# Patient Record
Sex: FEMALE | Race: BLACK OR AFRICAN AMERICAN | Employment: FULL TIME | URBAN - METROPOLITAN AREA
[De-identification: names, ages, dates, MRNs, and addresses within clinical notes are randomized per-mention and may not be internally consistent; named-entity substitution may affect disease eponyms.]

---

## 2017-01-31 ENCOUNTER — TELEPHONE (OUTPATIENT)
Dept: INTERNAL MEDICINE CLINIC | Age: 57
End: 2017-01-31

## 2017-02-01 RX ORDER — INSULIN DEGLUDEC 200 U/ML
55 INJECTION, SOLUTION SUBCUTANEOUS DAILY
Qty: 1 PEN | Refills: 12
Start: 2017-02-01 | End: 2017-02-03 | Stop reason: SDUPTHER

## 2017-02-06 RX ORDER — INSULIN DEGLUDEC 200 U/ML
55 INJECTION, SOLUTION SUBCUTANEOUS DAILY
Qty: 1 PEN | Refills: 12 | Status: SHIPPED | OUTPATIENT
Start: 2017-02-06 | End: 2017-06-08

## 2017-03-10 RX ORDER — PEN NEEDLE, DIABETIC 32 GX 1/4"
NEEDLE, DISPOSABLE MISCELLANEOUS
Qty: 100 PEN NEEDLE | Refills: 0 | Status: SHIPPED | OUTPATIENT
Start: 2017-03-10 | End: 2017-04-25 | Stop reason: SDUPTHER

## 2017-04-14 ENCOUNTER — OFFICE VISIT (OUTPATIENT)
Dept: INTERNAL MEDICINE CLINIC | Age: 57
End: 2017-04-14

## 2017-04-14 VITALS
OXYGEN SATURATION: 98 % | SYSTOLIC BLOOD PRESSURE: 136 MMHG | TEMPERATURE: 98 F | HEART RATE: 88 BPM | WEIGHT: 185 LBS | BODY MASS INDEX: 32.78 KG/M2 | RESPIRATION RATE: 16 BRPM | HEIGHT: 63 IN | DIASTOLIC BLOOD PRESSURE: 88 MMHG

## 2017-04-14 DIAGNOSIS — I10 ESSENTIAL HYPERTENSION WITH GOAL BLOOD PRESSURE LESS THAN 140/90: ICD-10-CM

## 2017-04-14 DIAGNOSIS — E11.42 CONTROLLED TYPE 2 DIABETES MELLITUS WITH DIABETIC POLYNEUROPATHY, WITH LONG-TERM CURRENT USE OF INSULIN (HCC): ICD-10-CM

## 2017-04-14 DIAGNOSIS — E78.00 HYPERCHOLESTEREMIA: Primary | ICD-10-CM

## 2017-04-14 DIAGNOSIS — Z79.4 CONTROLLED TYPE 2 DIABETES MELLITUS WITH DIABETIC POLYNEUROPATHY, WITH LONG-TERM CURRENT USE OF INSULIN (HCC): ICD-10-CM

## 2017-04-14 DIAGNOSIS — G57.61 MORTON NEUROMA, RIGHT: ICD-10-CM

## 2017-04-14 RX ORDER — PREGABALIN 150 MG/1
CAPSULE ORAL
Qty: 60 CAP | Refills: 3 | Status: SHIPPED | OUTPATIENT
Start: 2017-04-14 | End: 2017-07-17 | Stop reason: ALTCHOICE

## 2017-04-14 RX ORDER — ATORVASTATIN CALCIUM 40 MG/1
TABLET, FILM COATED ORAL
Qty: 30 TAB | Refills: 7 | Status: SHIPPED | OUTPATIENT
Start: 2017-04-14 | End: 2018-03-29 | Stop reason: SDUPTHER

## 2017-04-14 NOTE — PROGRESS NOTES
Glen Lynn is a 55 y.o. female and presents with Diabetes (foot pain) and Referral Request (ortho)  . Subjective:  She has pains in the rt foot with a numbness reported. He also has pains in her hands with a numbness and tingling. She has seen podiatry and was told she has a neuroma. She has had no relief with neurontin. Health maintenance suggests the needs for a test for occult blood        Hypertension Review:  The patient has hypertension . Diet and Lifestyle: generally follows a low sodium diet, exercises sporadically  Home BP Monitoring: is not measured at home. Pertinent ROS: taking medications as instructed, no medication side effects noted, no TIA's, no chest pain on exertion, no dyspnea on exertion, no swelling of ankles. Dyslipidemia Review:  Patient presents for evaluation of lipids. Compliance with treatment thus far has been excellent. A repeat fasting lipid profile was done. The patient does not use medications that may worsen dyslipidemias . The patient exercises sporadically. The patient is not known to have coexisting coronary artery disease    Diabetes Mellitus Review:  She has diabetes mellitus. Diabetic ROS - medication compliance: compliant all of the time, diabetic diet compliance: compliant all of the time, home glucose monitoring: is performed. Known diabetic complications: none  Cardiovascular risk factors: family history, dyslipidemia, diabetes mellitus, obesity, hypertension  Current diabetic medications include oral agents/insulin   Eye exam current (within one year): no  Weight trend: stable  Prior visit with dietician: no  Current diet: \"healthy\" diet  in general  Current exercise: walking  Current monitoring regimen: home blood tests - daily  Home blood sugar records: trend: stable  Any episodes of hypoglycemia? no  Is She on ACE inhibitor or angiotensin II receptor blocker?    Yes   Lab review: orders written for new lab studies as appropriate; see orders. Insomnia Review:  Patient is seen for insomnia. Treatment includes benzodiazepines   Ongoing symptoms include continued bouts of insomnia,bouts of anxiety and depression are reported          Review of Systems  Constitutional: negative for fevers, chills, anorexia and weight loss  Eyes:   negative for visual disturbance and irritation  ENT:   negative for tinnitus,sore throat,nasal congestion,ear pains. hoarseness  Respiratory:  negative for cough, hemoptysis, dyspnea,wheezing  CV:   negative for chest pain, palpitations, lower extremity edema  GI:   negative for nausea, vomiting, diarrhea, abdominal pain,melena  Endo:               negative for polyuria,polydipsia,polyphagia,heat intolerance  Genitourinary: negative for frequency, dysuria and hematuria  Integument:  negative for rash and pruritus  Hematologic:  negative for easy bruising and gum/nose bleeding  Musculoskel: negative for myalgias, arthralgias, back pain, muscle weakness, joint pain  Neurological:  negative for headaches, dizziness, vertigo, memory problems and gait   Behavl/Psych: feelings of anxiety, depression, mood changes,insomnia    Past Medical History:   Diagnosis Date    [facility]     Diabetes Mellitus 4/25/2011    Diabetes Mellitus 4/25/2011    GERG - Esophagitis- reflux 4/25/2011    Hypercholesteremia 4/25/2011    Hypercholesterolemia 4/25/2011    Hypertension 4/25/2011    Intertrigo 4/25/2011    Leiomyoma of uterus, unspecified 4/25/2011    Unspecified visual loss 4/25/2011     History reviewed. No pertinent surgical history.   Social History     Social History    Marital status:      Spouse name: N/A    Number of children: N/A    Years of education: N/A     Social History Main Topics    Smoking status: Former Smoker    Smokeless tobacco: Never Used    Alcohol use No    Drug use: No    Sexual activity: No     Other Topics Concern    None     Social History Narrative     Family History   Problem Relation Age of Onset    Diabetes Mother     Hypertension Mother     Diabetes Father      Current Outpatient Prescriptions   Medication Sig Dispense Refill    atorvastatin (LIPITOR) 40 mg tablet TAKE 1 TABLET BY MOUTH DAILY. 30 Tab 7    pregabalin (LYRICA) 150 mg capsule TAKE ONE CAPSULE BY MOUTH TWICE A DAY 60 Cap 3    NOVOFINE 32 32 gauge x 1/4\" ndle USE AS DIRECTED 100 Pen Needle 0    PARoxetine (PAXIL) 10 mg tablet Take 1 Tab by mouth daily. 30 Tab 3    mometasone (NASONEX) 50 mcg/actuation nasal spray 2 Sprays by Both Nostrils route daily. 1 Container 12    losartan (COZAAR) 100 mg tablet Take 1 Tab by mouth daily. 90 Tab 3    amLODIPine (NORVASC) 5 mg tablet TAKE 1 TABLET EVERY DAY 30 Tab 12    capsaicin 0.075 % topical cream Apply  to affected area three (3) times daily. 60 g 12    insulin detemir (LEVEMIR FLEXTOUCH) 100 unit/mL (3 mL) inpn 30 units am,25units pm sc 1 Pen 12    naproxen (NAPROSYN) 500 mg tablet Take 1 Tab by mouth two (2) times daily (with meals). 60 Tab 12    zolpidem (AMBIEN) 5 mg tablet Take 1 Tab by mouth nightly as needed for Sleep. 30 Tab 0    albuterol (PROVENTIL HFA) 90 mcg/actuation inhaler Take 2 Puffs by inhalation every four (4) hours as needed for Wheezing. 1 Inhaler 12    omeprazole (PRILOSEC) 40 mg capsule Take 1 Cap by mouth daily. 30 Cap 12    Blood-Glucose Meter monitoring kit CONTOUR METER; Use as directed 1 Kit 0    glucose blood VI test strips (ASCENSIA CONTOUR) strip Daily 50 Package 11    glucose blood VI test strips (BLOOD GLUCOSE TEST) strip As directed   1 Package 12    insulin degludec (TRESIBA FLEXTOUCH U-200) 200 unit/mL (3 mL) inpn 55 Units by SubCUTAneous route daily.  Inject 55 units once daily 1 Pen 12     Allergies   Allergen Reactions    Sulfur Hives    Amoxicillin Nausea Only    Other Medication Unknown (comments)     Zylocaine         Objective:  Visit Vitals    /88    Pulse 88    Temp 98 °F (36.7 °C) (Oral)    Resp 16     5' 3\" (1.6 m)    Wt 185 lb (83.9 kg)    LMP 10/07/2012    SpO2 98%    BMI 32.77 kg/m2     Physical Exam:   General appearance - alert, well appearing, and in no distress  Mental status - alert, oriented to person, place, and time  EYE-CHARLES, EOMI, corneas normal, no foreign bodies  ENT-ENT exam normal, no neck nodes or sinus tenderness  Nose - normal and patent, no erythema, discharge or polyps  Mouth - mucous membranes moist, pharynx normal without lesions  Neck - supple, no significant adenopathy   Chest - clear to auscultation, no wheezes, rales or rhonchi, symmetric air entry   Heart - normal rate, regular rhythm, normal S1, S2, no murmurs, rubs, clicks or gallops   Abdomen - soft, nontender, nondistended, no masses or organomegaly  Lymph- no adenopathy palpable  Ext-peripheral pulses normal, no pedal edema, no clubbing or cyanosis  Skin-Warm and dry. no hyperpigmentation, vitiligo, or suspicious lesions  Neuro -alert, oriented, normal speech, no focal findings or movement disorder noted  Neck-normal C-spine, no tenderness, full ROM without pain  Rt.foot-tenderness noted plantar surface. Decrease sensation both feet. Decrease sensation both hands    Results for orders placed or performed in visit on 09/22/16   AMB POC GLUCOSE BLOOD, BY GLUCOSE MONITORING DEVICE   Result Value Ref Range    Glucose  mg/dL   AMB POC HEMOGLOBIN A1C   Result Value Ref Range    Hemoglobin A1c (POC) 10.9 %   AMB POC LIPID PROFILE   Result Value Ref Range    Cholesterol (POC) 189     Triglycerides (POC) 137     HDL Cholesterol (POC) 49     LDL Cholesterol (POC) 113     Non-HDL Goal (POC) 140     TChol/HDL Ratio (POC) 3.8        Assessment/Plan:    ICD-10-CM ICD-9-CM    1. Hypercholesteremia E78.00 272.0 atorvastatin (LIPITOR) 40 mg tablet   2.  Controlled type 2 diabetes mellitus with diabetic polyneuropathy, with long-term current use of insulin (HCC) E11.42 250.60 pregabalin (LYRICA) 150 mg capsule    Z79.4 357.2 V58. 67    3. Essential hypertension with goal blood pressure less than 140/90 I10 401.9    4. Govea neuroma, right G57.61 355. 6 pregabalin (LYRICA) 150 mg capsule      REFERRAL TO PODIATRY     Orders Placed This Encounter    REFERRAL TO PODIATRY     Referral Priority:   Routine     Referral Type:   Consultation     Referral Reason:   Specialty Services Required     Referral Location:   [facility]     Referred to Provider:   Sandra Branch DPM     Number of Visits Requested:   1    atorvastatin (LIPITOR) 40 mg tablet     Sig: TAKE 1 TABLET BY MOUTH DAILY. Dispense:  30 Tab     Refill:  7    pregabalin (LYRICA) 150 mg capsule     Sig: TAKE ONE CAPSULE BY MOUTH TWICE A DAY     Dispense:  60 Cap     Refill:  3     This request is for a new prescription for a controlled substance as required by Federal/State law. .     lose weight, increase physical activity, follow low fat diet, follow low salt diet,Take 81mg aspirin daily  Patient Instructions   Savored Activation    Thank you for requesting access to Savored. Please follow the instructions below to securely access and download your online medical record. Savored allows you to send messages to your doctor, view your test results, renew your prescriptions, schedule appointments, and more. How Do I Sign Up? 1. In your internet browser, go to [email or web address]  2. Click on the First Time User? Click Here link in the Sign In box. You will be redirect to the New Member Sign Up page. 3. Enter your Savored Access Code exactly as it appears below. You will not need to use this code after youve completed the sign-up process. If you do not sign up before the expiration date, you must request a new code. Savored Access Code: Activation code not generated  Current Savored Status: Patient Declined (This is the date your Savored access code will )    4.  Enter the last four digits of your Social Security Number (xxxx) and Date of Birth (mm/dd/yyyy) as indicated and click Submit. You will be taken to the next sign-up page. 5. Create a WinLocalt ID. This will be your Avedro login ID and cannot be changed, so think of one that is secure and easy to remember. 6. Create a Avedro password. You can change your password at any time. 7. Enter your Password Reset Question and Answer. This can be used at a later time if you forget your password. 8. Enter your e-mail address. You will receive e-mail notification when new information is available in [address] 9. Click Sign Up. You can now view and download portions of your medical record. 10. Click the Download Summary menu link to download a portable copy of your medical information. Additional Information    If you have questions, please visit the Frequently Asked Questions section of the Avedro website at [email or web address] [email or web address] My Life My [email or web address] Remember, Avedro is NOT to be used for urgent needs. For medical emergencies, dial 911. Follow-up Disposition:  Return in about 3 months (around 7/14/2017), or if symptoms worsen or fail to improve. I have reviewed with the patient details of the assessment and plan and all questions were answered. Relevent patient education was performed    An After Visit Summary was printed and given to the patient.

## 2017-04-14 NOTE — PATIENT INSTRUCTIONS
[employer] Activation    Thank you for requesting access to [employer]. Please follow the instructions below to securely access and download your online medical record. [employer] allows you to send messages to your doctor, view your test results, renew your prescriptions, schedule appointments, and more. How Do I Sign Up? 1. In your internet browser, go to [email or web address]  2. Click on the First Time User? Click Here link in the Sign In box. You will be redirect to the New Member Sign Up page. 3. Enter your [employer] Access Code exactly as it appears below. You will not need to use this code after youve completed the sign-up process. If you do not sign up before the expiration date, you must request a new code. [employer] Products Access Code: Activation code not generated  Current [employer] Status: Patient Declined (This is the date your [employer] access code will )    4. Enter the last four digits of your Social Security Number (xxxx) and Date of Birth (mm/dd/yyyy) as indicated and click Submit. You will be taken to the next sign-up page. 5. Create a [employer] Products ID. This will be your [employer] login ID and cannot be changed, so think of one that is secure and easy to remember. 6. Create a [employer] Products password. You can change your password at any time. 7. Enter your Password Reset Question and Answer. This can be used at a later time if you forget your password. 8. Enter your e-mail address. You will receive e-mail notification when new information is available in [address] 9. Click Sign Up. You can now view and download portions of your medical record. 10. Click the Download Summary menu link to download a portable copy of your medical information. Additional Information    If you have questions, please visit the Frequently Asked Questions section of the Renew[employer] Products website at [email or web address] Remember, [employer] Products is NOT to be used for urgent needs. For medical emergencies, dial 911.

## 2017-04-14 NOTE — MR AVS SNAPSHOT
Visit Information Date & Time Provider Department Dept. Phone Encounter #  
 4/14/2017  9:30 AM Anusha Wilder MD [facility] [facility] [phone] [id number] Follow-up Instructions Return in about 3 months (around 7/14/2017), or if symptoms worsen or fail to improve. Follow-up and Disposition History Your Appointments 4/21/2017 10:30 AM  
ROUTINE CARE with Anusha Wilder MD  
[facility] - Vikas Chaevz ([facility]) Appt Note: Check up w/ med refill [address] [facility],[address] [facility] 7 [phone]  
[phone]  
  
   
 [address] [place] [phone] Upcoming Health Maintenance Date Due Pneumococcal 19-64 Medium Risk (1 of 1 - PPSV23) 8/10/1980 INFLUENZA AGE 9 TO ADULT 8/1/2016 FOBT Q 1 YEAR AGE 50-75 10/23/2016 HEMOGLOBIN A1C Q6M 3/22/2017 PAP AKA CERVICAL CYTOLOGY 4/15/2017 MICROALBUMIN Q1 6/22/2017 LIPID PANEL Q1 9/22/2017 EYE EXAM RETINAL OR DILATED Q1 12/7/2017 BREAST CANCER SCRN MAMMOGRAM 2/26/2018 FOOT EXAM Q1 4/14/2018 DTaP/Tdap/Td series (2 - Td) 2/27/2025 Allergies as of 4/14/2017  Review Complete On: 4/14/2017 By: Esthela Eli LPN Severity Noted Reaction Type Reactions Sulfur High 03/10/2016    Hives Amoxicillin  04/19/2013    Nausea Only Other Medication  04/25/2011    Unknown (comments) [place] [employer] Current Immunizations  Reviewed on 2/27/2015 Name Date Tdap 2/27/2015 Not reviewed this visit You Were Diagnosed With   
  
 Codes Comments Hypercholesteremia    -  Primary ICD-10-CM: E78.00 ICD-9-CM: 272.0 Controlled type 2 diabetes mellitus with diabetic polyneuropathy, with long-term current use of insulin (HCC)     ICD-10-CM: E11.42, Z79.4 ICD-9-CM: 250.60, 357.2, V58.67 Essential hypertension with goal blood pressure less than 140/90     ICD-10-CM: I10 
ICD-9-CM: 401.9 Jeferson neuroma, right     ICD-10-CM: G57.62 ICD-9-CM: 845. 6 Vitals BP Pulse Temp Resp Height(growth percentile) Weight(growth percentile) 136/88 88 98 °F (36.7 °C) (Oral) 16 5' 3\" (1.6 m) 185 lb (83.9 kg) LMP SpO2 BMI OB Status Smoking Status 10/07/2012 98% 32.77 kg/m2 Postmenopausal Former Smoker BMI and BSA Data Body Mass Index Body Surface Area 32.77 kg/m 2 1.93 m 2 Preferred Pharmacy Pharmacy Name Phone [id number], [place] - [address] [phone] Your Updated Medication List  
  
   
This list is accurate as of: 4/14/17  9:57 AM.  Always use your most recent med list.  
  
  
  
  
 albuterol 90 mcg/actuation inhaler Commonly known as:  PROVENTIL HFA Take 2 Puffs by inhalation every four (4) hours as needed for Wheezing. amLODIPine 5 mg tablet Commonly known as:  [employer] TAKE 1 TABLET EVERY DAY  
  
 atorvastatin 40 mg tablet Commonly known as:  LIPITOR  
TAKE 1 TABLET BY MOUTH DAILY. Blood-Glucose Meter monitoring kit CONTOUR METER; Use as directed  
  
 capsaicin 0.075 % topical cream  
Apply  to affected area three (3) times daily. * glucose blood VI test strips strip Commonly known as:  blood glucose test  
As directed * glucose blood VI test strips strip Commonly known as:  Ascensia CONTOUR Daily  
  
 insulin degludec 200 unit/mL (3 mL) Inpn Commonly known as:  TRESIBA FLEXTOUCH U-200  
55 Units by SubCUTAneous route daily. Inject 55 units once daily  
  
 insulin detemir 100 unit/mL (3 mL) Inpn Commonly known as:  LEVEMIR FLEXTOUCH  
30 units am,25units pm sc  
  
 losartan 100 mg tablet Commonly known as:  COZAAR Take 1 Tab by mouth daily. mometasone 50 mcg/actuation nasal spray Commonly known as:  NASONEX  
2 Sprays by Both Nostrils route daily. naproxen 500 mg tablet Commonly known as:  NAPROSYN  
 Take 1 Tab by mouth two (2) times daily (with meals). NOVOFINE 32 32 gauge x 1/4\" Ndle Generic drug:  Insulin Needles (Disposable) USE AS DIRECTED  
  
 omeprazole 40 mg capsule Commonly known as:  PriLOSEC Take 1 Cap by mouth daily. PARoxetine 10 mg tablet Commonly known as:  PAXIL Take 1 Tab by mouth daily. pregabalin 150 mg capsule Commonly known as:  Irving [employer]el TAKE ONE CAPSULE BY MOUTH TWICE A DAY  
  
 zolpidem 5 mg tablet Commonly known as:  AMBIEN Take 1 Tab by mouth nightly as needed for Sleep. * Notice: This list has 2 medication(s) that are the same as other medications prescribed for you. Read the directions carefully, and ask your doctor or other care provider to review them with you. Prescriptions Printed Refills  
 pregabalin (LYRICA) 150 mg capsule 3 Sig: TAKE ONE CAPSULE BY MOUTH TWICE A DAY Class: Print Prescriptions Sent to Pharmacy Refills  
 atorvastatin (LIPITOR) 40 mg tablet 7 Sig: TAKE 1 TABLET BY MOUTH DAILY. Class: Normal  
 Pharmacy: [facility]/pharmacy [address] [place] [address] S[address]  #: [phone] We Performed the Following REFERRAL TO PODIATRY [REF90 Custom] Follow-up Instructions Return in about 3 months (around 7/14/2017), or if symptoms worsen or fail to improve. Referral Information Referral ID Referred By Referred To  
  
 [id number] [facility] [address] [place] [address], [address] Visits Status Start Date End Date 1 New Request 4/14/17 4/14/18 If your referral has a status of pending review or denied, additional information will be sent to support the outcome of this decision. Patient Instructions [email or web address] Activation Thank you for requesting access to GetIntent.  Please follow the instructions below to securely access and download your online medical record. Solapa4 allows you to send messages to your doctor, view your test results, renew your prescriptions, schedule appointments, and more. How Do I Sign Up? 1. In your internet browser, go to [email or web address] 
2. Click on the First Time User? Click Here link in the Sign In box. You will be redirect to the New Member Sign Up page. 3. Enter your Solapa4 Access Code exactly as it appears below. You will not need to use this code after youve completed the sign-up process. If you do not sign up before the expiration date, you must request a new code. Solapa4 Access Code: Activation code not generated Current Solapa4 Status: Patient Declined (This is the date your Solapa4 access code will ) 4. Enter the last four digits of your Social Security Number (xxxx) and Date of Birth (mm/dd/yyyy) as indicated and click Submit. You will be taken to the next sign-up page. 5. Create a Solapa4 ID. This will be your Solapa4 login ID and cannot be changed, so think of one that is secure and easy to remember. 6. Create a Solapa4 password. You can change your password at any time. 7. Enter your Password Reset Question and Answer. This can be used at a later time if you forget your password. 8. Enter your e-mail address. You will receive e-mail notification when new information is available in [address] 9. Click Sign Up. You can now view and download portions of your medical record. 10. Click the Download Summary menu link to download a portable copy of your medical information. Additional Information If you have questions, please visit the Frequently Asked Questions section of the Solapa4 website at [email or web address] Remember, Solapa4 is NOT to be used for urgent needs. For medical emergencies, dial 911. Please provide this summary of care documentation to your next provider. Your primary care clinician is listed as Felix Enamorado. If you have any questions after today's visit, please call [phone].

## 2017-04-25 RX ORDER — BLOOD SUGAR DIAGNOSTIC
STRIP MISCELLANEOUS
Qty: 100 PEN NEEDLE | Refills: 0 | Status: SHIPPED | OUTPATIENT
Start: 2017-04-25 | End: 2017-12-17 | Stop reason: SDUPTHER

## 2017-06-08 ENCOUNTER — APPOINTMENT (OUTPATIENT)
Dept: CT IMAGING | Age: 57
End: 2017-06-08
Attending: PHYSICIAN ASSISTANT
Payer: COMMERCIAL

## 2017-06-08 ENCOUNTER — HOSPITAL ENCOUNTER (EMERGENCY)
Age: 57
Discharge: HOME | End: 2017-06-08
Attending: EMERGENCY MEDICINE
Payer: COMMERCIAL

## 2017-06-08 VITALS
OXYGEN SATURATION: 97 % | RESPIRATION RATE: 18 BRPM | HEART RATE: 67 BPM | WEIGHT: 177.69 LBS | TEMPERATURE: 98.5 F | BODY MASS INDEX: 32.7 KG/M2 | HEIGHT: 62 IN | SYSTOLIC BLOOD PRESSURE: 206 MMHG | DIASTOLIC BLOOD PRESSURE: 101 MMHG

## 2017-06-08 DIAGNOSIS — K80.20 CALCULUS OF GALLBLADDER WITHOUT CHOLECYSTITIS WITHOUT OBSTRUCTION: ICD-10-CM

## 2017-06-08 DIAGNOSIS — Z71.6 TOBACCO ABUSE COUNSELING: ICD-10-CM

## 2017-06-08 DIAGNOSIS — R11.2 NON-INTRACTABLE VOMITING WITH NAUSEA, UNSPECIFIED VOMITING TYPE: Primary | ICD-10-CM

## 2017-06-08 LAB
ALBUMIN SERPL BCP-MCNC: 3.6 G/DL (ref 3.5–5)
ALBUMIN/GLOB SERPL: 0.9 {RATIO} (ref 1.1–2.2)
ALP SERPL-CCNC: 133 U/L (ref 45–117)
ALT SERPL-CCNC: 23 U/L (ref 12–78)
ANION GAP BLD CALC-SCNC: 3 MMOL/L (ref 5–15)
APPEARANCE UR: CLEAR
AST SERPL W P-5'-P-CCNC: 102 U/L (ref 15–37)
BACTERIA URNS QL MICRO: NEGATIVE /HPF
BASOPHILS # BLD AUTO: 0 K/UL (ref 0–0.1)
BASOPHILS # BLD: 0 % (ref 0–1)
BILIRUB SERPL-MCNC: 0.5 MG/DL (ref 0.2–1)
BILIRUB UR QL: NEGATIVE
BUN SERPL-MCNC: 11 MG/DL (ref 6–20)
BUN/CREAT SERPL: 9 (ref 12–20)
CALCIUM SERPL-MCNC: 9.1 MG/DL (ref 8.5–10.1)
CHLORIDE SERPL-SCNC: 102 MMOL/L (ref 97–108)
CO2 SERPL-SCNC: 33 MMOL/L (ref 21–32)
COLOR UR: ABNORMAL
CREAT SERPL-MCNC: 1.22 MG/DL (ref 0.55–1.02)
EOSINOPHIL # BLD: 0 K/UL (ref 0–0.4)
EOSINOPHIL NFR BLD: 0 % (ref 0–7)
EPITH CASTS URNS QL MICRO: ABNORMAL /LPF
ERYTHROCYTE [DISTWIDTH] IN BLOOD BY AUTOMATED COUNT: 13.5 % (ref 11.5–14.5)
GLOBULIN SER CALC-MCNC: 4.1 G/DL (ref 2–4)
GLUCOSE BLD STRIP.AUTO-MCNC: 279 MG/DL (ref 65–100)
GLUCOSE SERPL-MCNC: 281 MG/DL (ref 65–100)
GLUCOSE UR STRIP.AUTO-MCNC: 250 MG/DL
HCT VFR BLD AUTO: 43 % (ref 35–47)
HGB BLD-MCNC: 14.3 G/DL (ref 11.5–16)
HGB UR QL STRIP: NEGATIVE
HYALINE CASTS URNS QL MICRO: ABNORMAL /LPF (ref 0–5)
KETONES UR QL STRIP.AUTO: NEGATIVE MG/DL
LACTATE SERPL-SCNC: 1.2 MMOL/L (ref 0.4–2)
LEUKOCYTE ESTERASE UR QL STRIP.AUTO: NEGATIVE
LIPASE SERPL-CCNC: 81 U/L (ref 73–393)
LYMPHOCYTES # BLD AUTO: 12 % (ref 12–49)
LYMPHOCYTES # BLD: 1.3 K/UL (ref 0.8–3.5)
MCH RBC QN AUTO: 27 PG (ref 26–34)
MCHC RBC AUTO-ENTMCNC: 33.3 G/DL (ref 30–36.5)
MCV RBC AUTO: 81.1 FL (ref 80–99)
MONOCYTES # BLD: 0.5 K/UL (ref 0–1)
MONOCYTES NFR BLD AUTO: 4 % (ref 5–13)
NEUTS SEG # BLD: 8.9 K/UL (ref 1.8–8)
NEUTS SEG NFR BLD AUTO: 84 % (ref 32–75)
NITRITE UR QL STRIP.AUTO: NEGATIVE
PH UR STRIP: 6.5 [PH] (ref 5–8)
PLATELET # BLD AUTO: 189 K/UL (ref 150–400)
POTASSIUM SERPL-SCNC: 3.2 MMOL/L (ref 3.5–5.1)
PROT SERPL-MCNC: 7.7 G/DL (ref 6.4–8.2)
PROT UR STRIP-MCNC: ABNORMAL MG/DL
RBC # BLD AUTO: 5.3 M/UL (ref 3.8–5.2)
RBC #/AREA URNS HPF: ABNORMAL /HPF (ref 0–5)
SERVICE CMNT-IMP: ABNORMAL
SODIUM SERPL-SCNC: 138 MMOL/L (ref 136–145)
SP GR UR REFRACTOMETRY: 1.01 (ref 1–1.03)
UA: UC IF INDICATED,UAUC: ABNORMAL
UROBILINOGEN UR QL STRIP.AUTO: 0.2 EU/DL (ref 0.2–1)
WBC # BLD AUTO: 10.8 K/UL (ref 3.6–11)
WBC URNS QL MICRO: ABNORMAL /HPF (ref 0–4)

## 2017-06-08 PROCEDURE — 74011250636 HC RX REV CODE- 250/636: Performed by: PHYSICIAN ASSISTANT

## 2017-06-08 PROCEDURE — 74011250636 HC RX REV CODE- 250/636: Performed by: EMERGENCY MEDICINE

## 2017-06-08 PROCEDURE — 74011250637 HC RX REV CODE- 250/637: Performed by: PHYSICIAN ASSISTANT

## 2017-06-08 PROCEDURE — 96374 THER/PROPH/DIAG INJ IV PUSH: CPT

## 2017-06-08 PROCEDURE — 81001 URINALYSIS AUTO W/SCOPE: CPT | Performed by: PHYSICIAN ASSISTANT

## 2017-06-08 PROCEDURE — 74011636320 HC RX REV CODE- 636/320: Performed by: EMERGENCY MEDICINE

## 2017-06-08 PROCEDURE — 85025 COMPLETE CBC W/AUTO DIFF WBC: CPT | Performed by: PHYSICIAN ASSISTANT

## 2017-06-08 PROCEDURE — 80053 COMPREHEN METABOLIC PANEL: CPT | Performed by: PHYSICIAN ASSISTANT

## 2017-06-08 PROCEDURE — 82962 GLUCOSE BLOOD TEST: CPT

## 2017-06-08 PROCEDURE — 99285 EMERGENCY DEPT VISIT HI MDM: CPT

## 2017-06-08 PROCEDURE — 96375 TX/PRO/DX INJ NEW DRUG ADDON: CPT

## 2017-06-08 PROCEDURE — 96361 HYDRATE IV INFUSION ADD-ON: CPT

## 2017-06-08 PROCEDURE — 83690 ASSAY OF LIPASE: CPT | Performed by: PHYSICIAN ASSISTANT

## 2017-06-08 PROCEDURE — 74177 CT ABD & PELVIS W/CONTRAST: CPT

## 2017-06-08 PROCEDURE — 83605 ASSAY OF LACTIC ACID: CPT | Performed by: PHYSICIAN ASSISTANT

## 2017-06-08 PROCEDURE — 36415 COLL VENOUS BLD VENIPUNCTURE: CPT | Performed by: PHYSICIAN ASSISTANT

## 2017-06-08 PROCEDURE — 96376 TX/PRO/DX INJ SAME DRUG ADON: CPT

## 2017-06-08 RX ORDER — PIROXICAM 20 MG/1
20 CAPSULE ORAL DAILY
COMMUNITY
End: 2017-10-17 | Stop reason: ALTCHOICE

## 2017-06-08 RX ORDER — AMLODIPINE BESYLATE 5 MG/1
5 TABLET ORAL
Status: COMPLETED | OUTPATIENT
Start: 2017-06-08 | End: 2017-06-08

## 2017-06-08 RX ORDER — SODIUM CHLORIDE 9 MG/ML
50 INJECTION, SOLUTION INTRAVENOUS
Status: COMPLETED | OUTPATIENT
Start: 2017-06-08 | End: 2017-06-08

## 2017-06-08 RX ORDER — ONDANSETRON 2 MG/ML
4 INJECTION INTRAMUSCULAR; INTRAVENOUS
Status: COMPLETED | OUTPATIENT
Start: 2017-06-08 | End: 2017-06-08

## 2017-06-08 RX ORDER — POTASSIUM CHLORIDE 750 MG/1
20 TABLET, FILM COATED, EXTENDED RELEASE ORAL
Status: COMPLETED | OUTPATIENT
Start: 2017-06-08 | End: 2017-06-08

## 2017-06-08 RX ORDER — SODIUM CHLORIDE 0.9 % (FLUSH) 0.9 %
10 SYRINGE (ML) INJECTION
Status: COMPLETED | OUTPATIENT
Start: 2017-06-08 | End: 2017-06-08

## 2017-06-08 RX ORDER — ONDANSETRON 4 MG/1
4 TABLET, ORALLY DISINTEGRATING ORAL
Qty: 20 TAB | Refills: 0 | Status: SHIPPED | OUTPATIENT
Start: 2017-06-08 | End: 2017-07-17 | Stop reason: ALTCHOICE

## 2017-06-08 RX ORDER — DICYCLOMINE HYDROCHLORIDE 20 MG/1
20 TABLET ORAL EVERY 6 HOURS
Qty: 20 TAB | Refills: 0 | Status: SHIPPED | OUTPATIENT
Start: 2017-06-08 | End: 2017-06-13

## 2017-06-08 RX ORDER — MORPHINE SULFATE 4 MG/ML
4 INJECTION, SOLUTION INTRAMUSCULAR; INTRAVENOUS ONCE
Status: COMPLETED | OUTPATIENT
Start: 2017-06-08 | End: 2017-06-08

## 2017-06-08 RX ORDER — HYDROCODONE BITARTRATE AND ACETAMINOPHEN 5; 325 MG/1; MG/1
1 TABLET ORAL
Qty: 10 TAB | Refills: 0 | Status: SHIPPED | OUTPATIENT
Start: 2017-06-08 | End: 2017-07-17 | Stop reason: ALTCHOICE

## 2017-06-08 RX ADMIN — Medication 10 ML: at 10:30

## 2017-06-08 RX ADMIN — SODIUM CHLORIDE 50 ML/HR: 900 INJECTION, SOLUTION INTRAVENOUS at 10:30

## 2017-06-08 RX ADMIN — ONDANSETRON HYDROCHLORIDE 4 MG: 2 INJECTION, SOLUTION INTRAMUSCULAR; INTRAVENOUS at 11:34

## 2017-06-08 RX ADMIN — SODIUM CHLORIDE 1000 ML: 900 INJECTION, SOLUTION INTRAVENOUS at 09:24

## 2017-06-08 RX ADMIN — MORPHINE SULFATE 4 MG: 4 INJECTION, SOLUTION INTRAMUSCULAR; INTRAVENOUS at 09:20

## 2017-06-08 RX ADMIN — AMLODIPINE BESYLATE 5 MG: 5 TABLET ORAL at 11:04

## 2017-06-08 RX ADMIN — IOPAMIDOL 100 ML: 755 INJECTION, SOLUTION INTRAVENOUS at 10:30

## 2017-06-08 RX ADMIN — ONDANSETRON HYDROCHLORIDE 4 MG: 2 INJECTION, SOLUTION INTRAMUSCULAR; INTRAVENOUS at 09:20

## 2017-06-08 RX ADMIN — POTASSIUM CHLORIDE 20 MEQ: 750 TABLET, FILM COATED, EXTENDED RELEASE ORAL at 10:11

## 2017-06-08 NOTE — DISCHARGE INSTRUCTIONS
Low-Fat Diet for Gallbladder Disease: Care Instructions  Your Care Instructions  When you eat, the gallbladder releases bile, which helps you digest the fat in food. If you have an inflamed gallbladder, this may cause pain. A low-fat diet may give your gallbladder a rest so you can start to heal. Your doctor and dietitian can help you make an eating plan that does not irritate your digestive system. Always talk with your doctor or dietitian before you make changes in your diet. Follow-up care is a key part of your treatment and safety. Be sure to make and go to all appointments, and call your doctor if you are having problems. It's also a good idea to know your test results and keep a list of the medicines you take. How can you care for yourself at home? · Eat many small meals and snacks each day instead of three large meals. · Choose lean meats. ¨ Eat no more than 5 to 6½ ounces of meat a day. ¨ Cut off all fat you can see. ¨ Eat chicken and turkey without the skin. ¨ Many types of fish, such as salmon, lake trout, tuna, and herring, provide healthy omega-3 fat. But, avoid fish canned in oil, such as sardines in olive oil. ¨ Bake, broil, or grill meats, poultry, or fish instead of frying them in butter or fat. · Drink or eat nonfat or low-fat milk, yogurt, cheese, or other milk products each day. ¨ Read the labels on cheeses, and choose those with less than 5 grams of fat an ounce. ¨ Try fat-free sour cream, cream cheese, or yogurt. ¨ Avoid cream soups and cream sauces on pasta. ¨ Eat low-fat ice cream, frozen yogurt, or sorbet. Avoid regular ice cream.  · Eat whole-grain cereals, breads, crackers, rice, or pasta. Avoid high-fat foods such as croissants, scones, biscuits, waffles, doughnuts, muffins, granola, and high-fat breads. · Flavor your foods with herbs and spices (such as basil, tarragon, or mint), fat-free sauces, or lemon juice instead of butter.  You can also use butter substitutes, fat-free mayonnaise, or fat-free dressing. · Try applesauce, prune puree, or mashed bananas to replace some or all of the fat when you bake. · Limit fats and oils, such as butter, margarine, mayonnaise, and salad dressing, to no more than 1 tablespoon a meal.  · Avoid high-fat foods, such as:  ¨ Chocolate, whole milk, ice cream, and processed cheese. ¨ Fried or buttered foods. ¨ Sausage, salami, and gomez. ¨ Cinnamon rolls, cakes, pies, cookies, and other pastries. ¨ Prepared snack foods, such as potato chips, nut and granola bars, and mixed nuts. ¨ Coconut and avocado. · Learn how to read food labels for serving sizes and ingredients. Fast-food and convenience-food meals often have lots of fat. Where can you learn more? Go to http://cuateMind on Gamesmita.info/. Enter N902 in the search box to learn more about \"Low-Fat Diet for Gallbladder Disease: Care Instructions. \"  Current as of: July 26, 2016  Content Version: 11.2  © 2897-6224 [employer]. Care instructions adapted under license by [employer] (which disclaims liability or warranty for this information). If you have questions about a medical condition or this instruction, always ask your healthcare professional. [place] [address] any warranty or liability for your use of this information. Biliary Colic: Care Instructions  Your Care Instructions    Biliary (say \"BILL-ee-air-ee\") colic is belly pain caused by gallbladder problems. It is usually caused by a gallstone moving through or blocking the common bile duct or cystic duct. Gallstones are stones that form in the gallbladder. They are made of cholesterol and other substances. The gallbladder is a small sac located just under the liver. It stores bile released by the liver. Bile helps you digest fats. Gallstones also can form in the common bile duct or cystic duct.  These ducts carry bile from the gallbladder and the liver to the small intestine. Gallstones may be as small as a grain of sand or as large as a golf ball. Gallstones that cause severe symptoms usually are treated with surgery to remove the gallbladder. If the first attack of biliary colic is mild, it is often safe to wait until you have had another attack before you think about having surgery. The doctor has checked you carefully, but problems can develop later. If you notice any problems or new symptoms, get medical treatment right away. Follow-up care is a key part of your treatment and safety. Be sure to make and go to all appointments, and call your doctor if you are having problems. It's also a good idea to know your test results and keep a list of the medicines you take. How can you care for yourself at home? · Take pain medicines exactly as directed. ¨ If the doctor gave you a prescription medicine for pain, take it as prescribed. ¨ If you are not taking a prescription pain medicine, ask your doctor if you can take an over-the-counter medicine. Read and follow all instructions on the label. · Avoid foods that cause symptoms, especially fatty foods. These can cause biliary colic. · You may need more tests to look at your gallbladder. When should you call for help? Call your doctor now or seek immediate medical care if:  · You have a fever. · You have new belly pain, or your pain gets worse. · There is a new or increasing yellow tint to your skin or the whites of your eyes. · Your urine is dark yellow-brown, or your stools are light-colored or white. · You cannot keep down fluids. Watch closely for changes in your health, and be sure to contact your doctor if:  · You do not get better as expected. · You are not getting better after 1 day (24 hours). Where can you learn more? Go to http://cuate-mita.info/. Enter H789 in the search box to learn more about \"Biliary Colic: Care Instructions. \"  Current as of: August 9, 2016  Content Version: 11.2  © 2006-2017 [employer]. Care instructions adapted under license by [employer] (which disclaims liability or warranty for this information). If you have questions about a medical condition or this instruction, always ask your healthcare professional. [place] [address] any warranty or liability for your use of this information. Gallbladder and Liver: Anatomy Sketch    Current as of: January 5, 2017  Content Version: 11.2  © 2006-2017 [employer]. Care instructions adapted under license by [employer] (which disclaims liability or warranty for this information). If you have questions about a medical condition or this instruction, always ask your healthcare professional. [address] any warranty or liability for your use of this information. Nausea and Vomiting: Care Instructions  Your Care Instructions    When you are nauseated, you may feel weak and sweaty and notice a lot of saliva in your mouth. Nausea often leads to vomiting. Most of the time you do not need to worry about nausea and vomiting, but they can be signs of other illnesses. Two common causes of nausea and vomiting are stomach flu and food poisoning. Nausea and vomiting from viral stomach flu will usually start to improve within 24 hours. Nausea and vomiting from food poisoning may last from 12 to 48 hours. The doctor has checked you carefully, but problems can develop later. If you notice any problems or new symptoms, get medical treatment right away. Follow-up care is a key part of your treatment and safety. Be sure to make and go to all appointments, and call your doctor if you are having problems. It's also a good idea to know your test results and keep a list of the medicines you take. How can you care for yourself at home? · To prevent dehydration, drink plenty of fluids, enough so that your urine is light yellow or clear like water. Choose water and other caffeine-free clear liquids until you feel better. If you have kidney, heart, or liver disease and have to limit fluids, talk with your doctor before you increase the amount of fluids you drink. · Rest in bed until you feel better. · When you are able to eat, try clear soups, mild foods, and liquids until all symptoms are gone for 12 to 48 hours. Other good choices include dry toast, crackers, cooked cereal, and gelatin dessert, such as Jell-O. When should you call for help? Call 911 anytime you think you may need emergency care. For example, call if:  · You passed out (lost consciousness). Call your doctor now or seek immediate medical care if:  · You have symptoms of dehydration, such as:  ¨ Dry eyes and a dry mouth. ¨ Passing only a little dark urine. ¨ Feeling thirstier than usual.  · You have new or worsening belly pain. · You have a new or higher fever. · You vomit blood or what looks like coffee grounds. Watch closely for changes in your health, and be sure to contact your doctor if:  · You have ongoing nausea and vomiting. · Your vomiting is getting worse. · Your vomiting lasts longer than 2 days. · You are not getting better as expected. Where can you learn more? Go to http://cuate-mita.info/. Enter 25 [id number] in the search box to learn more about \"Nausea and Vomiting: Care Instructions. \"  Current as of: May 27, 2016  Content Version: 11.2  © 7836-1371 [employer]. Care instructions adapted under license by [employer] (which disclaims liability or warranty for this information). If you have questions about a medical condition or this instruction, always ask your healthcare professional. [place] [address] any warranty or liability for your use of this information.

## 2017-06-08 NOTE — ED PROVIDER NOTES
HPI Comments: Abbi Ervin is a 55 y.o. female, pmhx significant for GERD, DM, HTN, HLD, and Leiomyoma of uterus, who presents ambulatory to the ED c/o nausea and vomiting x yesterday. Pt notes associated diffuse middle abdominal pain. Pt states that her abdominal pain is \"pulling\" in nature, and non-radiating. Pt states that she has been unable to tolerate PO/fluids secondary to her vomiting, stating that she last ate last night, but vomited shortly afterwards. Pt does not endorse any exacerbating factors for her recent symptom onset, specifically denying changes to her symptoms by exertion, or changes in position. Pt states that \"rocking, or shaking\" make her pain tolerable. Pt denies h/o abdominal surgeries, or similar episodes in the past. Pt denies diarrhea, fever, chills, chest pain, SOB, dizziness, lightheadedness, or coffee ground emesis. PCP: Gara Paget., MD    PSHx: none  Social Hx: (+) tobacco (0.25ppd) , (-) EtOH,  (-) Illicit Drugs    There are no other complaints, changes, or physical findings at this time. The history is provided by the patient. No  was used. Past Medical History:   Diagnosis Date    Diabetes ([address]     Diabetes Mellitus 4/25/2011    Diabetes Mellitus 4/25/2011    GERG - Esophagitis- reflux 4/25/2011    Hypercholesteremia 4/25/2011    Hypercholesterolemia 4/25/2011    Hypertension 4/25/2011    Intertrigo 4/25/2011    Leiomyoma of uterus, unspecified 4/25/2011    Unspecified visual loss 4/25/2011       History reviewed. No pertinent surgical history. Family History:   Problem Relation Age of Onset    Diabetes Mother     Hypertension Mother     Diabetes Father        Social History     Social History    Marital status:      Spouse name: N/A    Number of children: N/A    Years of education: N/A     Occupational History    Not on file.      Social History Main Topics    Smoking status: Former Smoker    Smokeless tobacco: Never Used    Alcohol use No    Drug use: No    Sexual activity: No     Other Topics Concern    Not on file     Social History Narrative         ALLERGIES: Sulfur; Amoxicillin; and Other medication    Review of Systems   Constitutional: Negative. Negative for activity change, appetite change, chills, diaphoresis, fever and unexpected weight change. HENT: Negative for congestion, hearing loss, rhinorrhea, sinus pressure, sneezing, sore throat and trouble swallowing. Eyes: Negative for pain, redness, itching and visual disturbance. Respiratory: Negative for cough, shortness of breath and wheezing. Cardiovascular: Negative for chest pain, palpitations and leg swelling. Gastrointestinal: Positive for abdominal pain, nausea and vomiting. Negative for constipation and diarrhea. Genitourinary: Negative for dysuria. Musculoskeletal: Negative for arthralgias, gait problem and myalgias. Skin: Negative for color change, pallor, rash and wound. Neurological: Negative for dizziness, tremors, weakness, light-headedness, numbness and headaches. All other systems reviewed and are negative. Patient Vitals for the past 12 hrs:   BP SpO2   06/08/17 1126 (!) 206/101 97 %     Physical Exam   Constitutional: She is oriented to person, place, and time. She appears well-developed and well-nourished. No distress. 55 y.o.  female appears uncomfortable, rocking back and forth on stretcher  Communicates appropriately and in full sentences  Elevated BP 2/2 patient being unable to tolerate PO meds    HENT:   Head: Normocephalic and atraumatic. Right Ear: External ear normal.   Left Ear: External ear normal.   Mouth/Throat: Oropharynx is clear and moist. No oropharyngeal exudate. Eyes: Conjunctivae are normal. Pupils are equal, round, and reactive to light. Right eye exhibits no discharge. Left eye exhibits no discharge. Neck: Normal range of motion. Neck supple.  No tracheal deviation present. Cardiovascular: Normal rate, regular rhythm, normal heart sounds and intact distal pulses. Pulmonary/Chest: Effort normal and breath sounds normal. No stridor. No respiratory distress. She has no wheezes. Abdominal: Soft. Bowel sounds are normal. She exhibits no distension. There is no tenderness. No reproducible abdominal pain on exam  No abdominal scars  No active vomiting on exam   Musculoskeletal: Normal range of motion. She exhibits no edema, tenderness or deformity. No neurologic, motor, vascular, or compartment embarrassment observed on exam. No focal neurologic deficits. Lymphadenopathy:     She has no cervical adenopathy. Neurological: She is alert and oriented to person, place, and time. No cranial nerve deficit. Coordination normal.   Skin: Skin is warm and dry. No rash noted. She is not diaphoretic. No erythema. No pallor. Psychiatric: She has a normal mood and affect. Nursing note and vitals reviewed. MDM  Number of Diagnoses or Management Options  Calculus of gallbladder without cholecystitis without obstruction:   Non-intractable vomiting with nausea, unspecified vomiting type:    Tobacco abuse counseling:   Diagnosis management comments:   DDx: pancreatitis, gallbladder disease, UTI, pyelonephritis, gastroenteritis, viral illness, dehydration, electrolyte abnormality, diverticulitis       Amount and/or Complexity of Data Reviewed  Clinical lab tests: reviewed and ordered  Tests in the radiology section of CPT®: reviewed and ordered  Review and summarize past medical records: yes    Patient Progress  Patient progress: stable    ED Course       Procedures    I reviewed our electronic medical record system for any past medical records that were available that may contribute to the patients current condition, the nursing notes and and vital signs from today's visit     Nursing notes will be reviewed as they become available in realtime while the pt is in the ED.    Progress Note:  9:10 AM  The patients presenting problems have been discussed, and they are in agreement with the care plan formulated and outlined with them. I have encouraged them to ask questions as they arise throughout their visit. Will continue to monitor. TOBACCO COUNSELING:  Upon evaluation, pt expressed that they are a current tobacco user. Pt has been counseled on the dangers of smoking and was encouraged to quit as soon as possible in order to decrease further risks to their health. Pt has conveyed their understanding of the risks involved should they continue to use tobacco products. 9:19 AM  Emil Lea requests pain medications. Pt's has ride present in ED. Pain medications ordered. Pt informed that he/she should not drive while taking medication. Pt is in understanding. Progress Note:  9:39 AM  Pt re-evaluated. She reports feeling better after medications given to her in the ED. Written by Porsha Fernandes ED Scribe, as dictated by Raemsh Laboy PA-C. Progress Note:  11:15 AM  Pt tolerating PO, anticipate discharge. Written by Porsha Fernandes ED Scribe, as dictated by Ramesh Laboy PA-C    DISCHARGE NOTE:  11:21 AM  Emil Lea  results have been reviewed with her. She has been counseled regarding her diagnosis. She verbally conveys understanding and agreement of the signs, symptoms, diagnosis, treatment and prognosis and additionally agrees to follow up as recommended with Dr. Yazan Matos MD in 24 - 48 hours. She also agrees with the care-plan and conveys that all of her questions have been answered. I have also put together some discharge instructions for her that include: 1) educational information regarding their diagnosis, 2) how to care for their diagnosis at home, as well a 3) list of reasons why they would want to return to the ED prior to their follow-up appointment, should their condition change.  She and/or family's questions have been answered. I have encouraged them to see the official results in [facility]\" or to retrieve the specifics of their results from medical records. LABS COMPLETED AND REVIEWED:  Recent Results (from the past 12 hour(s))   GLUCOSE, POC    Collection Time: 06/08/17 11:06 AM   Result Value Ref Range    Glucose (POC) 279 (H) 65 - 100 mg/dL    Performed by [address]:  CT Results  (Last 48 hours)               06/08/17 1030  CT ABD PELV W CONT Final result    Impression:  IMPRESSION:       1. Cholelithiasis. 2. Otherwise study is unremarkable. Narrative:  INDICATION: Abdominal pain; vomiting x 2 weeks       COMPARISON:       TECHNIQUE:    Following the uneventful intravenous administration of 100 cc Isovue-370, thin   axial images were obtained through the abdomen and pelvis. Coronal and sagittal   reconstructions were generated. Oral contrast was not administered. CT dose   reduction was achieved through use of a standardized protocol tailored for this   examination and automatic exposure control for dose modulation. FINDINGS:    LUNG BASES: Clear. INCIDENTALLY IMAGED HEART AND MEDIASTINUM: Unremarkable. LIVER: No mass or biliary dilatation. GALLBLADDER: There are gallstones. SPLEEN: No mass. PANCREAS: No mass or ductal dilatation. ADRENALS: Unremarkable. KIDNEYS: No mass, calculus, or hydronephrosis. STOMACH: Unremarkable. SMALL BOWEL: No dilatation or wall thickening. COLON: No dilatation or wall thickening. APPENDIX: Unremarkable. PERITONEUM: No ascites or pneumoperitoneum. RETROPERITONEUM: No lymphadenopathy or aortic aneurysm. REPRODUCTIVE ORGANS: Unremarkable   URINARY BLADDER: No mass or calculus. BONES: No destructive bone lesion. ADDITIONAL COMMENTS: N/A                 CLINICAL IMPRESSION:  1. Non-intractable vomiting with nausea, unspecified vomiting type    2. Tobacco abuse counseling    3. Calculus of gallbladder without cholecystitis without obstruction        Plan:  1. Return precautions  2. Medications as prescribed  3. Follow-ups as discussed  Discharge Medication List as of 6/8/2017 11:21 AM      START taking these medications    Details   ondansetron (ZOFRAN ODT) 4 mg disintegrating tablet Take 1 Tab by mouth every eight (8) hours as needed for Nausea., Normal, Disp-20 Tab, R-0      HYDROcodone-acetaminophen (NORCO) 5-325 mg per tablet Take 1 Tab by mouth every four (4) hours as needed for Pain. Max Daily Amount: 6 Tabs., Print, Disp-10 Tab, R-0      dicyclomine (BENTYL) 20 mg tablet Take 1 Tab by mouth every six (6) hours for 20 doses. , Normal, Disp-20 Tab, R-0         CONTINUE these medications which have NOT CHANGED    Details   piroxicam (FELDENE) 20 mg capsule Take 20 mg by mouth daily. , Historical Med      Insulin Needles, Disposable, (NOVOFINE 32) 32 gauge x 1/4\" ndle USE AS DIRECTED, Normal, Disp-100 Pen Needle, R-0      atorvastatin (LIPITOR) 40 mg tablet TAKE 1 TABLET BY MOUTH DAILY., Normal, Disp-30 Tab, R-7      pregabalin (LYRICA) 150 mg capsule TAKE ONE CAPSULE BY MOUTH TWICE A DAY, PrintThis request is for a new prescription for a controlled substance as required by Federal/State law. .Disp-60 Cap, R-3      PARoxetine (PAXIL) 10 mg tablet Take 1 Tab by mouth daily. , Normal, Disp-30 Tab, R-3      losartan (COZAAR) 100 mg tablet Take 1 Tab by mouth daily. , Normal, Disp-90 Tab, R-3      amLODIPine (NORVASC) 5 mg tablet TAKE 1 TABLET EVERY DAY, Normal, Disp-30 Tab, R-12      insulin detemir (LEVEMIR FLEXTOUCH) 100 unit/mL (3 mL) inpn 30 units am,25units pm sc, No Print, Disp-1 Pen, R-12      Blood-Glucose Meter monitoring kit CONTOUR METER; Use as directedNormal, Disp-1 Kit, R-0      !! glucose blood VI test strips (ASCENSIA CONTOUR) strip DailyNormal, Disp-50 Package, R-11      !! glucose blood VI test strips (BLOOD GLUCOSE TEST) strip As directed  Normal, Disp-1 Package, R-12 omeprazole (PRILOSEC) 40 mg capsule Take 1 Cap by mouth daily. , Normal, Disp-30 Cap, R-12       !! - Potential duplicate medications found. Please discuss with provider. Follow-up Information     Follow up With Details Comments Contact Info    Nishi Reyes MD Schedule an appointment as soon as possible for a visit in 2 days As needed, If symptoms worsen, Possible further evaluation and treatment [place]  [phone]      [facility] EMERGENCY DEPT Go to As needed, If symptoms worsen [address] [phone]    Rebecca Mixon MD Schedule an appointment as soon as possible for a visit in 2 days As needed, If symptoms worsen, Possible further evaluation and treatment [id number] [address]  [phone]          Return to the closest emergency room or follow up sooner for any deterioration      This note is prepared by Jessi Laguerre, acting as Scribe for [employer]. [employer]: The scribe's documentation has been prepared under my direction and personally reviewed by me in its entirety. I confirm that the note above accurately reflects all work, treatment, procedures, and medical decision making performed by me. This note will not be viewable in [address]

## 2017-06-08 NOTE — ED NOTES
Pt states she is feeling better after pain medication. Requested a cup of water. She was informed that she cannot eat or drink until her results are back. She was given mouth swabs.

## 2017-06-08 NOTE — ED NOTES
PA discussed dc instructions and information with pt. Pt verbalized understanding. Pt assisted out of ED via w/c by staff. No signs of distress noted.

## 2017-06-08 NOTE — ED NOTES
Pt states she has had mid abdominal pain for several weeks with n/v. Reports vomiting approx 8 times yesterday. Pt's sister at bedside. Sister states that pt did not want to be evaluated today, \"and i had to force her to come in.\"  Pt states that she has not taken her medications for the past few days d/t vomiting. Pt denies any cp/sob/urinary symptoms/melena/diarrhea.   Last BM was yesterday and \"normal.\"

## 2017-06-08 NOTE — LETTER
[address] 
[facility] EMERGENCY DEPT 
[address] [place] [address] [address] [phone] [phone] Work/School Note Date: 6/8/2017 To Whom It May concern: 
 
Darylene Prgurmeet was seen and treated today in the emergency room by the following provider(s): 
Attending Provider: Marycarmen Sahu. MD Jaiden 
Physician Assistant: Savanah Hammer. [facility] may return to work on 06/10/2017. Sincerely, 
 
 
 
 
Savanah Hammer.  [place], [place]

## 2017-06-13 ENCOUNTER — OFFICE VISIT (OUTPATIENT)
Dept: INTERNAL MEDICINE CLINIC | Age: 57
End: 2017-06-13

## 2017-06-13 VITALS
HEART RATE: 73 BPM | HEIGHT: 62 IN | WEIGHT: 179 LBS | BODY MASS INDEX: 32.94 KG/M2 | DIASTOLIC BLOOD PRESSURE: 84 MMHG | OXYGEN SATURATION: 99 % | RESPIRATION RATE: 16 BRPM | SYSTOLIC BLOOD PRESSURE: 158 MMHG | TEMPERATURE: 99 F

## 2017-06-13 DIAGNOSIS — Z79.4 CONTROLLED TYPE 2 DIABETES MELLITUS WITH DIABETIC POLYNEUROPATHY, WITH LONG-TERM CURRENT USE OF INSULIN (HCC): Primary | ICD-10-CM

## 2017-06-13 DIAGNOSIS — K20.90 ESOPHAGITIS, UNSPECIFIED: ICD-10-CM

## 2017-06-13 DIAGNOSIS — Z79.4 TYPE 2 DIABETES MELLITUS WITH HYPERGLYCEMIA, WITH LONG-TERM CURRENT USE OF INSULIN (HCC): ICD-10-CM

## 2017-06-13 DIAGNOSIS — R11.2 NON-INTRACTABLE VOMITING WITH NAUSEA, UNSPECIFIED VOMITING TYPE: ICD-10-CM

## 2017-06-13 DIAGNOSIS — E11.42 CONTROLLED TYPE 2 DIABETES MELLITUS WITH DIABETIC POLYNEUROPATHY, WITH LONG-TERM CURRENT USE OF INSULIN (HCC): Primary | ICD-10-CM

## 2017-06-13 DIAGNOSIS — E11.65 TYPE 2 DIABETES MELLITUS WITH HYPERGLYCEMIA, WITH LONG-TERM CURRENT USE OF INSULIN (HCC): ICD-10-CM

## 2017-06-13 LAB — GLUCOSE POC: 367 MG/DL

## 2017-06-13 NOTE — PATIENT INSTRUCTIONS
[employer] Activation    Thank you for requesting access to [employer]. Please follow the instructions below to securely access and download your online medical record. [employer] allows you to send messages to your doctor, view your test results, renew your prescriptions, schedule appointments, and more. How Do I Sign Up? 1. In your internet browser, go to [email or web address]  2. Click on the First Time User? Click Here link in the Sign In box. You will be redirect to the New Member Sign Up page. 3. Enter your [employer] Access Code exactly as it appears below. You will not need to use this code after youve completed the sign-up process. If you do not sign up before the expiration date, you must request a new code. [employer] Access Code: [email or web address]  Expires: 2017  9:31 AM (This is the date your [employer] access code will )    4. Enter the last four digits of your Social Security Number (xxxx) and Date of Birth (mm/dd/yyyy) as indicated and click Submit. You will be taken to the next sign-up page. 5. Create a [employer] ID. This will be your [employer] login ID and cannot be changed, so think of one that is secure and easy to remember. 6. Create a [employer] password. You can change your password at any time. 7. Enter your Password Reset Question and Answer. This can be used at a later time if you forget your password. 8. Enter your e-mail address. You will receive e-mail notification when new information is available in [address] 9. Click Sign Up. You can now view and download portions of your medical record. 10. Click the Download Summary menu link to download a portable copy of your medical information. Additional Information    If you have questions, please visit the Frequently Asked Questions section of the [employer] website at [email or web address] Remember, [employer] is NOT to be used for urgent needs. For medical emergencies, dial 911.

## 2017-06-13 NOTE — PROGRESS NOTES
Rush Gonzalez is a 55 y.o. female and presents with ED Follow-up (f/u); Diabetes; and Abdominal Pain  . Subjective:    She has reported recurrent nausea and vomiting  She was seen in er and placed on medication  She had a work up to include ct scan    Hypertension Review:  The patient has hypertension . Diet and Lifestyle: generally follows a low sodium diet, exercises sporadically  Home BP Monitoring: is not measured at home. Pertinent ROS: taking medications as instructed, no medication side effects noted, no TIA's, no chest pain on exertion, no dyspnea on exertion, no swelling of ankles. Dyslipidemia Review:  Patient presents for evaluation of lipids. Compliance with treatment thus far has been excellent. A repeat fasting lipid profile was done. The patient does not use medications that may worsen dyslipidemias . The patient exercises sporadically. The patient is not known to have coexisting coronary artery disease    Diabetes Mellitus Review:  She has diabetes mellitus. Diabetic ROS - medication compliance: compliant all of the time, diabetic diet compliance: compliant all of the time, home glucose monitoring: is performed. Known diabetic complications: none  Cardiovascular risk factors: family history, dyslipidemia, diabetes mellitus, obesity, hypertension  Current diabetic medications include oral agents/insulin   Eye exam current (within one year): no  Weight trend: stable  Prior visit with dietician: no  Current diet: \"healthy\" diet  in general  Current exercise: walking  Current monitoring regimen: home blood tests - daily  Home blood sugar records: trend: stable  Any episodes of hypoglycemia? no  Is She on ACE inhibitor or angiotensin II receptor blocker? Yes   Lab review: orders written for new lab studies as appropriate; see orders. Insomnia Review:  Patient is seen for insomnia.  Treatment includes benzodiazepines   Ongoing symptoms include continued bouts of insomnia,bouts of anxiety and depression are reported          Review of Systems  Constitutional: negative for fevers, chills, anorexia and weight loss  Eyes:   negative for visual disturbance and irritation  ENT:   negative for tinnitus,sore throat,nasal congestion,ear pains. hoarseness  Respiratory:  negative for cough, hemoptysis, dyspnea,wheezing  CV:   negative for chest pain, palpitations, lower extremity edema  GI:   negative for nausea, vomiting, diarrhea, abdominal pain,melena  Endo:               negative for polyuria,polydipsia,polyphagia,heat intolerance  Genitourinary: negative for frequency, dysuria and hematuria  Integument:  negative for rash and pruritus  Hematologic:  negative for easy bruising and gum/nose bleeding  Musculoskel: negative for myalgias, arthralgias, back pain, muscle weakness, joint pain  Neurological:  negative for headaches, dizziness, vertigo, memory problems and gait   Behavl/Psych: feelings of anxiety, depression, mood changes,insomnia    Past Medical History:   Diagnosis Date    [facility]     Diabetes Mellitus 4/25/2011    Diabetes Mellitus 4/25/2011    GERG - Esophagitis- reflux 4/25/2011    Hypercholesteremia 4/25/2011    Hypercholesterolemia 4/25/2011    Hypertension 4/25/2011    Intertrigo 4/25/2011    Leiomyoma of uterus, unspecified 4/25/2011    Unspecified visual loss 4/25/2011     No past surgical history on file.   Social History     Social History    Marital status:      Spouse name: N/A    Number of children: N/A    Years of education: N/A     Social History Main Topics    Smoking status: Former Smoker    Smokeless tobacco: Never Used    Alcohol use No    Drug use: No    Sexual activity: No     Other Topics Concern    None     Social History Narrative     Family History   Problem Relation Age of Onset    Diabetes Mother     Hypertension Mother     Diabetes Father      Current Outpatient Prescriptions   Medication Sig Dispense Refill    insulin glargine 300 unit/mL (1.5 mL) inpn 60 Units by SubCUTAneous route daily. 1 Pen 12    HYDROcodone-acetaminophen (NORCO) 5-325 mg per tablet Take 1 Tab by mouth every four (4) hours as needed for Pain. Max Daily Amount: 6 Tabs. 10 Tab 0    Insulin Needles, Disposable, (NOVOFINE 32) 32 gauge x 1/4\" ndle USE AS DIRECTED 100 Pen Needle 0    atorvastatin (LIPITOR) 40 mg tablet TAKE 1 TABLET BY MOUTH DAILY. 30 Tab 7    losartan (COZAAR) 100 mg tablet Take 1 Tab by mouth daily. 90 Tab 3    amLODIPine (NORVASC) 5 mg tablet TAKE 1 TABLET EVERY DAY 30 Tab 12    insulin detemir (LEVEMIR FLEXTOUCH) 100 unit/mL (3 mL) inpn 30 units am,25units pm sc 1 Pen 12    Blood-Glucose Meter monitoring kit CONTOUR METER; Use as directed 1 Kit 0    glucose blood VI test strips (ASCENSIA CONTOUR) strip Daily 50 Package 11    glucose blood VI test strips (BLOOD GLUCOSE TEST) strip As directed   1 Package 12    piroxicam (FELDENE) 20 mg capsule Take 20 mg by mouth daily.  ondansetron (ZOFRAN ODT) 4 mg disintegrating tablet Take 1 Tab by mouth every eight (8) hours as needed for Nausea. 20 Tab 0    dicyclomine (BENTYL) 20 mg tablet Take 1 Tab by mouth every six (6) hours for 20 doses. 20 Tab 0    pregabalin (LYRICA) 150 mg capsule TAKE ONE CAPSULE BY MOUTH TWICE A DAY 60 Cap 3    PARoxetine (PAXIL) 10 mg tablet Take 1 Tab by mouth daily. 30 Tab 3    omeprazole (PRILOSEC) 40 mg capsule Take 1 Cap by mouth daily.  30 Cap 12     Allergies   Allergen Reactions    Sulfur Hives    Amoxicillin Nausea Only    Other Medication Unknown (comments)     Zylocaine         Objective:  Visit Vitals    /84 (BP 1 Location: Right arm, BP Patient Position: Sitting)    Pulse 73    Temp 99 °F (37.2 °C) (Oral)    Resp 16    Ht 5' 2\" (1.575 m)    Wt 179 lb (81.2 kg)    LMP 10/07/2012    SpO2 99%    BMI 32.74 kg/m2     Physical Exam:   General appearance - alert, well appearing, and in no distress  Mental status - alert, oriented to person, place, and time  EYE-CHARLES, EOMI, corneas normal, no foreign bodies  ENT-ENT exam normal, no neck nodes or sinus tenderness  Nose - normal and patent, no erythema, discharge or polyps  Mouth - mucous membranes moist, pharynx normal without lesions  Neck - supple, no significant adenopathy   Chest - clear to auscultation, no wheezes, rales or rhonchi, symmetric air entry   Heart - normal rate, regular rhythm, normal S1, S2, no murmurs, rubs, clicks or gallops   Abdomen - soft, nontender, nondistended, no masses or organomegaly  Lymph- no adenopathy palpable  Ext-peripheral pulses normal, no pedal edema, no clubbing or cyanosis  Skin-Warm and dry. no hyperpigmentation, vitiligo, or suspicious lesions  Neuro -alert, oriented, normal speech, no focal findings or movement disorder noted  Neck-normal C-spine, no tenderness, full ROM without pain  Rt.foot-tenderness noted plantar surface. Decrease sensation both feet. Decrease sensation both hands    Results for orders placed or performed in visit on 06/13/17   AMB POC GLUCOSE BLOOD, BY GLUCOSE MONITORING DEVICE   Result Value Ref Range    Glucose  mg/dL       Assessment/Plan:    ICD-10-CM ICD-9-CM    1. Controlled type 2 diabetes mellitus with diabetic polyneuropathy, with long-term current use of insulin ([facility]) E11.42 250.60 AMB POC GLUCOSE BLOOD, BY GLUCOSE MONITORING DEVICE    Z79.4 357.2      V58.67    2. Type 2 diabetes mellitus with hyperglycemia, with long-term current use of insulin ([facility]) E11.65 250.00     Z79.4 790.29      V58.67    3.  GERG - Esophagitis- reflux K20.9 530.10 REFERRAL TO GASTROENTEROLOGY   4. Non-intractable vomiting with nausea, unspecified vomiting type R11.2 787.01 REFERRAL TO GASTROENTEROLOGY     Orders Placed This Encounter    REFERRAL TO GASTROENTEROLOGY     Referral Priority:   Routine     Referral Type:   Consultation     Referral Reason:   Specialty Services Required     Referred to Provider:   Milo Parsons MD Number of Visits Requested:   1    AMB POC GLUCOSE BLOOD, BY GLUCOSE MONITORING DEVICE    insulin glargine 300 unit/mL (1.5 mL) inpn     Si Units by SubCUTAneous route daily. Dispense:  1 Pen     Refill:  12     lose weight, increase physical activity, follow low fat diet, follow low salt diet,Take 81mg aspirin daily  Patient Instructions   MyChart Activation    Thank you for requesting access to [employer]. Please follow the instructions below to securely access and download your online medical record. [employer] allows you to send messages to your doctor, view your test results, renew your prescriptions, schedule appointments, and more. How Do I Sign Up? 1. In your internet browser, go to [email or web address]  2. Click on the First Time User? Click Here link in the Sign In box. You will be redirect to the New Member Sign Up page. 3. Enter your [employer] Access Code exactly as it appears below. You will not need to use this code after youve completed the sign-up process. If you do not sign up before the expiration date, you must request a new code. [employer] Access Code: [email or web address]  Expires: 2017  9:31 AM (This is the date your [employer] access code will )    4. Enter the last four digits of your Social Security Number (xxxx) and Date of Birth (mm/dd/yyyy) as indicated and click Submit. You will be taken to the next sign-up page. 5. Create a [employer] ID. This will be your [employer] login ID and cannot be changed, so think of one that is secure and easy to remember. 6. Create a [employer] password. You can change your password at any time. 7. Enter your Password Reset Question and Answer. This can be used at a later time if you forget your password. 8. Enter your e-mail address. You will receive e-mail notification when new information is available in [address] 9. Click Sign Up. You can now view and download portions of your medical record.   10. Click the Download Summary menu link to download a portable copy of your medical information. Additional Information    If you have questions, please visit the Frequently Asked Questions section of the [employer] website at [email or web address] Remember, [employer] is NOT to be used for urgent needs. For medical emergencies, dial 911. Follow-up Disposition:  Return in about 4 weeks (around 7/11/2017), or if symptoms worsen or fail to improve. I have reviewed with the patient details of the assessment and plan and all questions were answered. Relevent patient education was performed    An After Visit Summary was printed and given to the patient.

## 2017-06-13 NOTE — MR AVS SNAPSHOT
Visit Information Date & Time Provider Department Dept. Phone Encounter #  
 6/13/2017 12:15 PM Terra Greene MD [address] [facility] [phone] [id number] Follow-up Instructions Return in about 4 weeks (around 7/11/2017), or if symptoms worsen or fail to improve. Your Appointments 7/17/2017  8:30 AM  
ROUTINE CARE with Terra Greene MD  
[facility] - Hue Correa ([facility]) Appt Note: Check up w/ med refill; 3mo fu  
 [address] [facility] [facility] [address] [address]  
[phone]  
  
   
 [address] [place] [address] Upcoming Health Maintenance Date Due Pneumococcal 19-64 Medium Risk (1 of 1 - PPSV23) 8/10/1980 FOBT Q 1 YEAR AGE 50-75 10/23/2016 HEMOGLOBIN A1C Q6M 3/22/2017 PAP AKA CERVICAL CYTOLOGY 4/15/2017 MICROALBUMIN Q1 6/22/2017 INFLUENZA AGE 9 TO ADULT 8/1/2017 LIPID PANEL Q1 9/22/2017 EYE EXAM RETINAL OR DILATED Q1 12/7/2017 BREAST CANCER SCRN MAMMOGRAM 2/26/2018 FOOT EXAM Q1 4/14/2018 DTaP/Tdap/Td series (2 - Td) 2/27/2025 Allergies as of 6/13/2017  Review Complete On: 6/13/2017 By: Terra Greene MD  
  
 Severity Noted Reaction Type Reactions Sulfur High 03/10/2016    Hives Amoxicillin  04/19/2013    Nausea Only Other Medication  04/25/2011    Unknown (comments) Kaelana Constant Current Immunizations  Reviewed on 2/27/2015 Name Date Tdap 2/27/2015 Not reviewed this visit You Were Diagnosed With   
  
 Codes Comments Controlled type 2 diabetes mellitus with diabetic polyneuropathy, with long-term current use of insulin ([facility])    -  Primary ICD-10-CM: E11.42, Z79.4 ICD-9-CM: 250.60, 357.2, V58.67 Type 2 diabetes mellitus with hyperglycemia, with long-term current use of insulin ([facility])     ICD-10-CM: E11.65, Z79.4 ICD-9-CM: 250.00, 790.29, V58.67 Esophagitis, unspecified     ICD-10-CM: K20.9 ICD-9-CM: 530.10 Non-intractable vomiting with nausea, unspecified vomiting type     ICD-10-CM: R11.2 ICD-9-CM: 787.01 Vitals BP Pulse Temp Resp Height(growth percentile) Weight(growth percentile) 158/84 (BP 1 Location: Right arm, BP Patient Position: Sitting) 73 99 °F (37.2 °C) (Oral) 16 5' 2\" (1.575 m) 179 lb (81.2 kg) LMP SpO2 BMI OB Status Smoking Status 10/07/2012 99% 32.74 kg/m2 Postmenopausal Former Smoker Vitals History BMI and BSA Data Body Mass Index Body Surface Area 32.74 kg/m 2 1.88 m 2 Preferred Pharmacy Pharmacy Name Phone [id number]- [place], [place] - [address] [phone] Your Updated Medication List  
  
   
This list is accurate as of: 6/13/17  1:42 PM.  Always use your most recent med list. amLODIPine 5 mg tablet Commonly known as:  Karyle Lisandra TAKE 1 TABLET EVERY DAY  
  
 atorvastatin 40 mg tablet Commonly known as:  LIPITOR  
TAKE 1 TABLET BY MOUTH DAILY. Blood-Glucose Meter monitoring kit CONTOUR METER; Use as directed  
  
 dicyclomine 20 mg tablet Commonly known as:  BENTYL Take 1 Tab by mouth every six (6) hours for 20 doses. * glucose blood VI test strips strip Commonly known as:  blood glucose test  
As directed * glucose blood VI test strips strip Commonly known as:  Ascensia CONTOUR Daily HYDROcodone-acetaminophen 5-325 mg per tablet Commonly known as:  Ivette Donny Take 1 Tab by mouth every four (4) hours as needed for Pain. Max Daily Amount: 6 Tabs. insulin detemir 100 unit/mL (3 mL) Inpn Commonly known as:  LEVEMIR FLEXTOUCH  
30 units am,25units pm sc  
  
 insulin glargine 300 unit/mL (1.5 mL) Inpn 60 Units by SubCUTAneous route daily. Insulin Needles (Disposable) 32 gauge x 1/4\" Ndle Commonly known as:  NOVOFINE 32 USE AS DIRECTED  
  
 losartan 100 mg tablet Commonly known as:  COZAAR  
 Take 1 Tab by mouth daily. omeprazole 40 mg capsule Commonly known as:  PriLOSEC Take 1 Cap by mouth daily. ondansetron 4 mg disintegrating tablet Commonly known as:  ZOFRAN ODT Take 1 Tab by mouth every eight (8) hours as needed for Nausea. PARoxetine 10 mg tablet Commonly known as:  PAXIL Take 1 Tab by mouth daily. piroxicam 20 mg capsule Commonly known as:  Chao Prosper Take 20 mg by mouth daily. pregabalin 150 mg capsule Commonly known as:  Vinh Beery TAKE ONE CAPSULE BY MOUTH TWICE A DAY * Notice: This list has 2 medication(s) that are the same as other medications prescribed for you. Read the directions carefully, and ask your doctor or other care provider to review them with you. We Performed the Following AMB POC GLUCOSE BLOOD, BY GLUCOSE MONITORING DEVICE [93049 CPT(R)] REFERRAL TO GASTROENTEROLOGY [YIK24 Custom] Follow-up Instructions Return in about 4 weeks (around 7/11/2017), or if symptoms worsen or fail to improve. Referral Information Referral ID Referred By Referred To  
  
 [id number] Seda Alejo MD   
   [address] [facility] [address] [place] Phone: [phone] Fax: [phone] Visits Status Start Date End Date 1 New Request 6/13/17 6/13/18 If your referral has a status of pending review or denied, additional information will be sent to support the outcome of this decision. Patient Instructions [employer] Activation Thank you for requesting access to [employer]. Please follow the instructions below to securely access and download your online medical record. [employer] allows you to send messages to your doctor, view your test results, renew your prescriptions, schedule appointments, and more. How Do I Sign Up? 1. In your internet browser, go to [email or web address] 
 2. Click on the First Time User? Click Here link in the Sign In box. You will be redirect to the New Member Sign Up page. 3. Enter your [employer] Access Code exactly as it appears below. You will not need to use this code after youve completed the sign-up process. If you do not sign up before the expiration date, you must request a new code. [employer] Access Code: [email or web address] Expires: 2017  9:31 AM (This is the date your [employer] access code will ) 4. Enter the last four digits of your Social Security Number (xxxx) and Date of Birth (mm/dd/yyyy) as indicated and click Submit. You will be taken to the next sign-up page. 5. Create a [employer] ID. This will be your [employer] login ID and cannot be changed, so think of one that is secure and easy to remember. 6. Create a [employer] password. You can change your password at any time. 7. Enter your Password Reset Question and Answer. This can be used at a later time if you forget your password. 8. Enter your e-mail address. You will receive e-mail notification when new information is available in [address] 9. Click Sign Up. You can now view and download portions of your medical record. 10. Click the Download Summary menu link to download a portable copy of your medical information. Additional Information If you have questions, please visit the Frequently Asked Questions section of the [employer] website at [email or web address] Cafe [email or web address] [email or web address]. [email or web address] [email or web address]/. Remember, [employer] is NOT to be used for urgent needs. For medical emergencies, dial 911. Introducing [facility]! [facility] introduces [employer] patient portal. Now you can access parts of your medical record, email your doctor's office, and request medication refills online. 1. In your internet browser, go to [email or web address] 2. Click on the First Time User? Click Here link in the Sign In box. You will see the New Member Sign Up page. 3. Enter your [employer] Access Code exactly as it appears below. You will not need to use this code after youve completed the sign-up process. If you do not sign up before the expiration date, you must request a new code. · [employer] Access Code: [email or web address] Expires: 9/6/2017  9:31 AM 
 
4. Enter the last four digits of your Social Security Number (xxxx) and Date of Birth (mm/dd/yyyy) as indicated and click Submit. You will be taken to the next sign-up page. 5. Create a [employer] ID. This will be your [employer] login ID and cannot be changed, so think of one that is secure and easy to remember. 6. Create a [employer] password. You can change your password at any time. 7. Enter your Password Reset Question and Answer. This can be used at a later time if you forget your password. 8. Enter your e-mail address. You will receive e-mail notification when new information is available in [address] 9. Click Sign Up. You can now view and download portions of your medical record. 10. Click the Download Summary menu link to download a portable copy of your medical information. If you have questions, please visit the Frequently Asked Questions section of the [employer] website. Remember, [employer] is NOT to be used for urgent needs. For medical emergencies, dial 911. Now available from your iPhone and Android! Please provide this summary of care documentation to your next provider. Your primary care clinician is listed as Cherie Duque. If you have any questions after today's visit, please call [phone].

## 2017-06-14 ENCOUNTER — TELEPHONE (OUTPATIENT)
Dept: INTERNAL MEDICINE CLINIC | Age: 57
End: 2017-06-14

## 2017-06-14 NOTE — TELEPHONE ENCOUNTER
Unable to reach patient by phone with number provided on chart. Twice. DR. Ananya Bautista IS NOT IN NETWORK, BUT DR. CHITRA LEPE IS. REFERRAL WAS APPROVED.

## 2017-06-28 ENCOUNTER — OFFICE VISIT (OUTPATIENT)
Dept: INTERNAL MEDICINE CLINIC | Age: 57
End: 2017-06-28

## 2017-06-28 VITALS
BODY MASS INDEX: 33.13 KG/M2 | TEMPERATURE: 98.3 F | OXYGEN SATURATION: 98 % | SYSTOLIC BLOOD PRESSURE: 138 MMHG | DIASTOLIC BLOOD PRESSURE: 82 MMHG | RESPIRATION RATE: 16 BRPM | WEIGHT: 180 LBS | HEIGHT: 62 IN | HEART RATE: 74 BPM

## 2017-06-28 DIAGNOSIS — E11.42 CONTROLLED TYPE 2 DIABETES MELLITUS WITH DIABETIC POLYNEUROPATHY, WITH LONG-TERM CURRENT USE OF INSULIN (HCC): Primary | ICD-10-CM

## 2017-06-28 DIAGNOSIS — Z79.4 CONTROLLED TYPE 2 DIABETES MELLITUS WITH DIABETIC POLYNEUROPATHY, WITH LONG-TERM CURRENT USE OF INSULIN (HCC): Primary | ICD-10-CM

## 2017-06-28 DIAGNOSIS — K80.12 CALCULUS OF GALLBLADDER WITH ACUTE ON CHRONIC CHOLECYSTITIS WITHOUT OBSTRUCTION: ICD-10-CM

## 2017-06-28 DIAGNOSIS — I10 ESSENTIAL HYPERTENSION: ICD-10-CM

## 2017-06-28 LAB — GLUCOSE POC: 258 MG/DL

## 2017-06-28 NOTE — PROGRESS NOTES
Emil Lea is a 55 y.o. female and presents with Diabetes and Hypertension  . Subjective:    She has reported recurrent nausea and vomiting  She was seen in er and placed on medication  She had a work up to include ct scan  The ct scan revealed gallstones    Hypertension Review:  The patient has hypertension . Diet and Lifestyle: generally follows a low sodium diet, exercises sporadically  Home BP Monitoring: is not measured at home. Pertinent ROS: taking medications as instructed, no medication side effects noted, no TIA's, no chest pain on exertion, no dyspnea on exertion, no swelling of ankles. Dyslipidemia Review:  Patient presents for evaluation of lipids. Compliance with treatment thus far has been excellent. A repeat fasting lipid profile was done. The patient does not use medications that may worsen dyslipidemias . The patient exercises sporadically. The patient is not known to have coexisting coronary artery disease    Diabetes Mellitus Review:  She has diabetes mellitus. Diabetic ROS - medication compliance: compliant all of the time, diabetic diet compliance: compliant all of the time, home glucose monitoring: is performed. Known diabetic complications: none  Cardiovascular risk factors: family history, dyslipidemia, diabetes mellitus, obesity, hypertension  Current diabetic medications include oral agents/insulin   Eye exam current (within one year): no  Weight trend: stable  Prior visit with dietician: no  Current diet: \"healthy\" diet  in general  Current exercise: walking  Current monitoring regimen: home blood tests - daily  Home blood sugar records: trend: stable  Any episodes of hypoglycemia? no  Is She on ACE inhibitor or angiotensin II receptor blocker? Yes   Lab review: orders written for new lab studies as appropriate; see orders. Insomnia Review:  Patient is seen for insomnia.  Treatment includes benzodiazepines   Ongoing symptoms include continued bouts of insomnia,bouts of anxiety and depression are reported          Review of Systems  Constitutional: negative for fevers, chills, anorexia and weight loss  Eyes:   negative for visual disturbance and irritation  ENT:   negative for tinnitus,sore throat,nasal congestion,ear pains. hoarseness  Respiratory:  negative for cough, hemoptysis, dyspnea,wheezing  CV:   negative for chest pain, palpitations, lower extremity edema  GI:   nausea, vomiting,abdominal pain,  Endo:               negative for polyuria,polydipsia,polyphagia,heat intolerance  Genitourinary: negative for frequency, dysuria and hematuria  Integument:  negative for rash and pruritus  Hematologic:  negative for easy bruising and gum/nose bleeding  Musculoskel: negative for myalgias, arthralgias, back pain, muscle weakness, joint pain  Neurological:  negative for headaches, dizziness, vertigo, memory problems and gait   Behavl/Psych: feelings of anxiety, depression, mood changes,insomnia    Past Medical History:   Diagnosis Date    Diabetes ([facility]     Diabetes Mellitus 4/25/2011    Diabetes Mellitus 4/25/2011    GERG - Esophagitis- reflux 4/25/2011    Hypercholesteremia 4/25/2011    Hypercholesterolemia 4/25/2011    Hypertension 4/25/2011    Intertrigo 4/25/2011    Leiomyoma of uterus, unspecified 4/25/2011    Unspecified visual loss 4/25/2011     History reviewed. No pertinent surgical history.   Social History     Social History    Marital status:      Spouse name: N/A    Number of children: N/A    Years of education: N/A     Social History Main Topics    Smoking status: Former Smoker    Smokeless tobacco: Never Used    Alcohol use No    Drug use: No    Sexual activity: No     Other Topics Concern    None     Social History Narrative     Family History   Problem Relation Age of Onset    Diabetes Mother     Hypertension Mother     Diabetes Father      Current Outpatient Prescriptions   Medication Sig Dispense Refill    insulin glargine 300 unit/mL (1.5 mL) inpn 60 Units by SubCUTAneous route daily. 1 Pen 12    piroxicam (FELDENE) 20 mg capsule Take 20 mg by mouth daily.  ondansetron (ZOFRAN ODT) 4 mg disintegrating tablet Take 1 Tab by mouth every eight (8) hours as needed for Nausea. 20 Tab 0    Insulin Needles, Disposable, (NOVOFINE 32) 32 gauge x 1/4\" ndle USE AS DIRECTED 100 Pen Needle 0    atorvastatin (LIPITOR) 40 mg tablet TAKE 1 TABLET BY MOUTH DAILY. 30 Tab 7    pregabalin (LYRICA) 150 mg capsule TAKE ONE CAPSULE BY MOUTH TWICE A DAY 60 Cap 3    PARoxetine (PAXIL) 10 mg tablet Take 1 Tab by mouth daily. 30 Tab 3    losartan (COZAAR) 100 mg tablet Take 1 Tab by mouth daily. 90 Tab 3    amLODIPine (NORVASC) 5 mg tablet TAKE 1 TABLET EVERY DAY 30 Tab 12    omeprazole (PRILOSEC) 40 mg capsule Take 1 Cap by mouth daily. 30 Cap 12    Blood-Glucose Meter monitoring kit CONTOUR METER; Use as directed 1 Kit 0    glucose blood VI test strips (ASCENSIA CONTOUR) strip Daily 50 Package 11    glucose blood VI test strips (BLOOD GLUCOSE TEST) strip As directed   1 Package 12    HYDROcodone-acetaminophen (NORCO) 5-325 mg per tablet Take 1 Tab by mouth every four (4) hours as needed for Pain. Max Daily Amount: 6 Tabs.  10 Tab 0    insulin detemir (LEVEMIR FLEXTOUCH) 100 unit/mL (3 mL) inpn 30 units am,25units pm sc 1 Pen 12     Allergies   Allergen Reactions    Sulfur Hives    Amoxicillin Nausea Only    Other Medication Unknown (comments)     Zylocaine         Objective:  Visit Vitals    /82    Pulse 74    Temp 98.3 °F (36.8 °C) (Oral)    Resp 16    Ht 5' 2\" (1.575 m)    Wt 180 lb (81.6 kg)    LMP 10/07/2012    SpO2 98%    BMI 32.92 kg/m2     Physical Exam:   General appearance - alert, well appearing, and in no distress  Mental status - alert, oriented to person, place, and time  EYE-CHARLES, EOMI, corneas normal, no foreign bodies  ENT-ENT exam normal, no neck nodes or sinus tenderness  Nose - normal and patent, no erythema, discharge or polyps  Mouth - mucous membranes moist, pharynx normal without lesions  Neck - supple, no significant adenopathy   Chest - clear to auscultation, no wheezes, rales or rhonchi, symmetric air entry   Heart - normal rate, regular rhythm, normal S1, S2, no murmurs, rubs, clicks or gallops   Abdomen - soft,tendeness ruqr, nondistended, no masses or organomegaly  Lymph- no adenopathy palpable  Ext-peripheral pulses normal, no pedal edema, no clubbing or cyanosis  Skin-Warm and dry. no hyperpigmentation, vitiligo, or suspicious lesions  Neuro -alert, oriented, normal speech, no focal findings or movement disorder noted  Neck-normal C-spine, no tenderness, full ROM without pain  Rt.foot-tenderness noted plantar surface. Decrease sensation both feet. Decrease sensation both hands    Results for orders placed or performed in visit on 06/28/17   AMB POC GLUCOSE BLOOD, BY GLUCOSE MONITORING DEVICE   Result Value Ref Range    Glucose  mg/dL       Assessment/Plan:    ICD-10-CM ICD-9-CM    1. Controlled type 2 diabetes mellitus with diabetic polyneuropathy, with long-term current use of insulin ([facility]) E11.42 250.60 AMB POC GLUCOSE BLOOD, BY GLUCOSE MONITORING DEVICE    Z79.4 357.2      V58.67    2. Essential hypertension I10 401.9    3. Calculus of gallbladder with acute on chronic cholecystitis without obstruction K80.12 574.00 REFERRAL TO GENERAL SURGERY     574.10      Orders Placed This Encounter    REFERRAL TO GENERAL SURGERY     Referral Priority:   Routine     Referral Type:   Consultation     Referral Reason:   Specialty Services Required     Referred to Provider:   Yohana Samuel MD     Number of Visits Requested:   1    AMB POC GLUCOSE BLOOD, BY GLUCOSE MONITORING DEVICE     lose weight, increase physical activity, follow low fat diet, follow low salt diet,Take 81mg aspirin daily  Patient Instructions   P[employer] Activation    Thank you for requesting access to [employer].  Please follow the instructions below to securely access and download your online medical record. [employer] allows you to send messages to your doctor, view your test results, renew your prescriptions, schedule appointments, and more. How Do I Sign Up? 1. In your internet browser, go to [email or web address]  2. Click on the First Time User? Click Here link in the Sign In box. You will be redirect to the New Member Sign Up page. 3. Enter your [employer] Access Code exactly as it appears below. You will not need to use this code after youve completed the sign-up process. If you do not sign up before the expiration date, you must request a new code. [employer] Access Code: Activation code not generated  Current [employer] Status: Patient Declined (This is the date your [employer] access code will )    4. Enter the last four digits of your Social Security Number (xxxx) and Date of Birth (mm/dd/yyyy) as indicated and click Submit. You will be taken to the next sign-up page. 5. Create a [employer] ID. This will be your [employer] login ID and cannot be changed, so think of one that is secure and easy to remember. 6. Create a [employer] password. You can change your password at any time. 7. Enter your Password Reset Question and Answer. This can be used at a later time if you forget your password. 8. Enter your e-mail address. You will receive e-mail notification when new information is available in [address] 9. Click Sign Up. You can now view and download portions of your medical record. 10. Click the Download Summary menu link to download a portable copy of your medical information. Additional Information    If you have questions, please visit the Frequently Asked Questions section of the [employer] website at [email or web address] Remember, [employer] is NOT to be used for urgent needs. For medical emergencies, dial 911.            Follow-up Disposition:  Return in about 4 weeks (around 2017), or if symptoms worsen or fail to improve. I have reviewed with the patient details of the assessment and plan and all questions were answered. Relevent patient education was performed    An After Visit Summary was printed and given to the patient.

## 2017-06-28 NOTE — PATIENT INSTRUCTIONS
DoujiaoharRaise Activation    Thank you for requesting access to [employer]. Please follow the instructions below to securely access and download your online medical record. [employer] allows you to send messages to your doctor, view your test results, renew your prescriptions, schedule appointments, and more. How Do I Sign Up? 1. In your internet browser, go to [email or web address]  2. Click on the First Time User? Click Here link in the Sign In box. You will be redirect to the New Member Sign Up page. 3. Enter your [employer] Access Code exactly as it appears below. You will not need to use this code after youve completed the sign-up process. If you do not sign up before the expiration date, you must request a new code. [employer] Access Code: Activation code not generated  Current [employer] Status: Patient Declined (This is the date your [employer] access code will )    4. Enter the last four digits of your Social Security Number (xxxx) and Date of Birth (mm/dd/yyyy) as indicated and click Submit. You will be taken to the next sign-up page. 5. Create a [employer] ID. This will be your [employer] login ID and cannot be changed, so think of one that is secure and easy to remember. 6. Create a [employer] password. You can change your password at any time. 7. Enter your Password Reset Question and Answer. This can be used at a later time if you forget your password. 8. Enter your e-mail address. You will receive e-mail notification when new information is available in [address] 9. Click Sign Up. You can now view and download portions of your medical record. 10. Click the Download Summary menu link to download a portable copy of your medical information. Additional Information    If you have questions, please visit the Frequently Asked Questions section of the [employer] website at [email or web address] Remember, [employer] is NOT to be used for urgent needs. For medical emergencies, dial 911.

## 2017-06-28 NOTE — MR AVS SNAPSHOT
Visit Information Date & Time Provider Department Dept. Phone Encounter #  
 6/28/2017 11:30 AM Jackson Cronin MD [place] [address] [facility] [place] [phone] [id number] Follow-up Instructions Return in about 4 weeks (around 7/26/2017), or if symptoms worsen or fail to improve. Follow-up and Disposition History Your Appointments 7/17/2017  8:30 AM  
ROUTINE CARE with Jackson Cronin MD  
[facility] - Joel Long [facility] Appt Note: Check up w/ med refill; 3mo fu  
 [address] [facility],[address] [place] [address] [id number]  
[phone]  
  
   
 [address] [place] [id number] Upcoming Health Maintenance Date Due Pneumococcal 19-64 Medium Risk (1 of 1 - PPSV23) 8/10/1980 FOBT Q 1 YEAR AGE 50-75 10/23/2016 HEMOGLOBIN A1C Q6M 3/22/2017 PAP AKA CERVICAL CYTOLOGY 4/15/2017 MICROALBUMIN Q1 6/22/2017 INFLUENZA AGE 9 TO ADULT 8/1/2017 LIPID PANEL Q1 9/22/2017 EYE EXAM RETINAL OR DILATED Q1 12/7/2017 BREAST CANCER SCRN MAMMOGRAM 2/26/2018 FOOT EXAM Q1 4/14/2018 DTaP/Tdap/Td series (2 - Td) 2/27/2025 Allergies as of 6/28/2017  Review Complete On: 6/28/2017 By: Jackson Cronin MD  
  
 Severity Noted Reaction Type Reactions Sulfur High 03/10/2016    Hives Amoxicillin  04/19/2013    Nausea Only Other Medication  04/25/2011    Unknown (comments) Juan Escamilla Current Immunizations  Reviewed on 2/27/2015 Name Date Tdap 2/27/2015 Not reviewed this visit You Were Diagnosed With   
  
 Codes Comments Controlled type 2 diabetes mellitus with diabetic polyneuropathy, with long-term current use of insulin ([facility])    -  Primary ICD-10-CM: E11.42, Z79.4 ICD-9-CM: 250.60, 357.2, V58.67 Essential hypertension     ICD-10-CM: I10 
ICD-9-CM: 401.9  Calculus of gallbladder with acute on chronic cholecystitis without obstruction     ICD-10-CM: K80.12 
 ICD-9-CM: 574.00, 574.10 Vitals BP Pulse Temp Resp Height(growth percentile) Weight(growth percentile) 138/82 74 98.3 °F (36.8 °C) (Oral) 16 5' 2\" (1.575 m) 180 lb (81.6 kg) LMP SpO2 BMI OB Status Smoking Status 10/07/2012 98% 32.92 kg/m2 Postmenopausal Former Smoker Vitals History BMI and BSA Data Body Mass Index Body Surface Area  
 32.92 kg/m 2 1.89 m 2 Preferred Pharmacy Pharmacy Name Phone [id number], [place] - [address] [phone] Your Updated Medication List  
  
   
This list is accurate as of: 6/28/17 12:04 PM.  Always use your most recent med list. amLODIPine 5 mg tablet Commonly known as:  Real Cradle TAKE 1 TABLET EVERY DAY  
  
 atorvastatin 40 mg tablet Commonly known as:  LIPITOR  
TAKE 1 TABLET BY MOUTH DAILY. Blood-Glucose Meter monitoring kit CONTOUR METER; Use as directed * glucose blood VI test strips strip Commonly known as:  blood glucose test  
As directed * glucose blood VI test strips strip Commonly known as:  Ascensia CONTOUR Daily HYDROcodone-acetaminophen 5-325 mg per tablet Commonly known as:  Diana Cruise Take 1 Tab by mouth every four (4) hours as needed for Pain. Max Daily Amount: 6 Tabs. insulin detemir 100 unit/mL (3 mL) Inpn Commonly known as:  LEVEMIR FLEXTOUCH  
30 units am,25units pm sc  
  
 insulin glargine 300 unit/mL (1.5 mL) Inpn 60 Units by SubCUTAneous route daily. Insulin Needles (Disposable) 32 gauge x 1/4\" Ndle Commonly known as:  NOVOFINE 32 USE AS DIRECTED  
  
 losartan 100 mg tablet Commonly known as:  COZAAR Take 1 Tab by mouth daily. omeprazole 40 mg capsule Commonly known as:  PriLOSEC Take 1 Cap by mouth daily. ondansetron 4 mg disintegrating tablet Commonly known as:  ZOFRAN ODT Take 1 Tab by mouth every eight (8) hours as needed for Nausea. PARoxetine 10 mg tablet Commonly known as:  PAXIL Take 1 Tab by mouth daily. piroxicam 20 mg capsule Commonly known as:  Solomon Eugene Take 20 mg by mouth daily. pregabalin 150 mg capsule Commonly known as:  Aristides Diesel TAKE ONE CAPSULE BY MOUTH TWICE A DAY * Notice: This list has 2 medication(s) that are the same as other medications prescribed for you. Read the directions carefully, and ask your doctor or other care provider to review them with you. We Performed the Following AMB POC GLUCOSE BLOOD, BY GLUCOSE MONITORING DEVICE [75296 CPT(R)] REFERRAL TO GENERAL SURGERY [REF27 Custom] Follow-up Instructions Return in about 4 weeks (around 7/26/2017), or if symptoms worsen or fail to improve. Referral Information Referral ID Referred By Referred To  
  
 [id number] [address] [place] [address] MD Edwin   
   [address] [place] [address], [id number] [place] Phone: [phone] Fax: [phone] Visits Status Start Date End Date 1 New Request 6/28/17 6/28/18 If your referral has a status of pending review or denied, additional information will be sent to support the outcome of this decision. Patient Instructions walkby Activation Thank you for requesting access to walkby. Please follow the instructions below to securely access and download your online medical record. walkby allows you to send messages to your doctor, view your test results, renew your prescriptions, schedule appointments, and more. How Do I Sign Up? 1. In your internet browser, go to [email or web address] 
2. Click on the First Time User? Click Here link in the Sign In box. You will be redirect to the New Member Sign Up page. 3. Enter your walkby Access Code exactly as it appears below. You will not need to use this code after youve completed the sign-up process.  If you do not sign up before the expiration date, you must request a new code. 
 
Proenza Sch[employer] Access Code: Activation code not generated Current [employer] Status: Patient Declined (This is the date your Proenza Schou[employer] access code will ) 4. Enter the last four digits of your Social Security Number (xxxx) and Date of Birth (mm/dd/yyyy) as indicated and click Submit. You will be taken to the next sign-up page. 5. Create a Proenza Schouert ID. This will be your [employer] login ID and cannot be changed, so think of one that is secure and easy to remember. 6. Create a Proenza Schouert password. You can change your password at any time. 7. Enter your Password Reset Question and Answer. This can be used at a later time if you forget your password. 8. Enter your e-mail address. You will receive e-mail notification when new information is available in [address] 9. Click Sign Up. You can now view and download portions of your medical record. 10. Click the Download Summary menu link to download a portable copy of your medical information. Additional Information If you have questions, please visit the Frequently Asked Questions section of the [employer] website at [email or web address] Remember, [employer] is NOT to be used for urgent needs. For medical emergencies, dial 911. Please provide this summary of care documentation to your next provider. Your primary care clinician is listed as Pita Dolan. If you have any questions after today's visit, please call [phone].

## 2017-07-17 ENCOUNTER — OFFICE VISIT (OUTPATIENT)
Dept: INTERNAL MEDICINE CLINIC | Age: 57
End: 2017-07-17

## 2017-07-17 VITALS
SYSTOLIC BLOOD PRESSURE: 140 MMHG | DIASTOLIC BLOOD PRESSURE: 76 MMHG | WEIGHT: 180 LBS | OXYGEN SATURATION: 99 % | HEIGHT: 62 IN | BODY MASS INDEX: 33.13 KG/M2 | HEART RATE: 63 BPM | TEMPERATURE: 98.5 F | RESPIRATION RATE: 14 BRPM

## 2017-07-17 DIAGNOSIS — Z12.11 SCREEN FOR COLON CANCER: ICD-10-CM

## 2017-07-17 DIAGNOSIS — E11.42 CONTROLLED TYPE 2 DIABETES MELLITUS WITH DIABETIC POLYNEUROPATHY, WITH LONG-TERM CURRENT USE OF INSULIN (HCC): Primary | ICD-10-CM

## 2017-07-17 DIAGNOSIS — I10 ESSENTIAL HYPERTENSION: ICD-10-CM

## 2017-07-17 DIAGNOSIS — Z79.4 CONTROLLED TYPE 2 DIABETES MELLITUS WITH DIABETIC POLYNEUROPATHY, WITH LONG-TERM CURRENT USE OF INSULIN (HCC): Primary | ICD-10-CM

## 2017-07-17 DIAGNOSIS — E78.00 HYPERCHOLESTEREMIA: ICD-10-CM

## 2017-07-17 LAB
ALBUMIN UR QL STRIP: 150 MG/L
CHOLEST SERPL-MCNC: 121 MG/DL
CREATININE, URINE POC: 300 MG/DL
GLUCOSE POC: 115 MG/DL
HBA1C MFR BLD HPLC: 9.5 %
HDLC SERPL-MCNC: 46 MG/DL
LDL CHOLESTEROL POC: 60 MG/DL
MICROALBUMIN/CREAT RATIO POC: NORMAL MG/G
NON HDL CHOL. (LDL+VLDL), 804568: 75
TCHOL/HDL RATIO (POC): 2.6
TRIGL SERPL-MCNC: 76 MG/DL

## 2017-07-17 NOTE — MR AVS SNAPSHOT
Visit Information Date & Time Provider Department Dept. Phone Encounter #  
 7/17/2017  8:30 AM Juan A Caballero MD [address] [place] [address] [phone] [id number] Follow-up Instructions Return in about 3 months (around 10/17/2017), or if symptoms worsen or fail to improve. Upcoming Health Maintenance Date Due Pneumococcal 19-64 Medium Risk (1 of 1 - PPSV23) 8/10/1980 FOBT Q 1 YEAR AGE 50-75 10/23/2016 HEMOGLOBIN A1C Q6M 3/22/2017 PAP AKA CERVICAL CYTOLOGY 4/15/2017 MICROALBUMIN Q1 6/22/2017 INFLUENZA AGE 9 TO ADULT 8/1/2017 LIPID PANEL Q1 9/22/2017 EYE EXAM RETINAL OR DILATED Q1 12/7/2017 BREAST CANCER SCRN MAMMOGRAM 2/26/2018 FOOT EXAM Q1 4/14/2018 DTaP/Tdap/Td series (2 - Td) 2/27/2025 Allergies as of 7/17/2017  Review Complete On: 7/17/2017 By: Juan A Caballero MD  
  
 Severity Noted Reaction Type Reactions Sulfur High 03/10/2016    Hives Amoxicillin  04/19/2013    Nausea Only Other Medication  04/25/2011    Unknown (comments) Samanta Patel Current Immunizations  Reviewed on 2/27/2015 Name Date Tdap 2/27/2015 Not reviewed this visit You Were Diagnosed With   
  
 Codes Comments Controlled type 2 diabetes mellitus with diabetic polyneuropathy, with long-term current use of insulin (HCC)    -  Primary ICD-10-CM: E11.42, Z79.4 ICD-9-CM: 250.60, 357.2, V58.67 Screen for colon cancer     ICD-10-CM: Z12.11 ICD-9-CM: V76.51 Hypercholesteremia     ICD-10-CM: E78.00 ICD-9-CM: 272.0 Essential hypertension     ICD-10-CM: I10 
ICD-9-CM: 401.9 Vitals BP Pulse Temp Resp Height(growth percentile) Weight(growth percentile) 140/76 63 98.5 °F (36.9 °C) (Oral) 14 5' 2\" (1.575 m) 180 lb (81.6 kg) LMP SpO2 BMI OB Status Smoking Status 10/07/2012 99% 32.92 kg/m2 Postmenopausal Former Smoker BMI and BSA Data Body Mass Index Body Surface Area 32.92 kg/m 2 1.89 m 2 Preferred Pharmacy Pharmacy Name Phone [id number]- [facility] [address] [phone] Your Updated Medication List  
  
   
This list is accurate as of: 7/17/17  9:15 AM.  Always use your most recent med list. amLODIPine 5 mg tablet Commonly known as:  Haig [place] TAKE 1 TABLET EVERY DAY  
  
 atorvastatin 40 mg tablet Commonly known as:  LIPITOR  
TAKE 1 TABLET BY MOUTH DAILY. Blood-Glucose Meter monitoring kit CONTOUR METER; Use as directed * glucose blood VI test strips strip Commonly known as:  blood glucose test  
As directed * glucose blood VI test strips strip Commonly known as:  Ascensia CONTOUR Daily  
  
 insulin glargine 300 unit/mL (1.5 mL) Inpn 60 Units by SubCUTAneous route daily. Insulin Needles (Disposable) 32 gauge x 1/4\" Ndle Commonly known as:  NOVOFINE 32 USE AS DIRECTED  
  
 losartan 100 mg tablet Commonly known as:  COZAAR Take 1 Tab by mouth daily. omeprazole 40 mg capsule Commonly known as:  PriLOSEC Take 1 Cap by mouth daily. piroxicam 20 mg capsule Commonly known as:  Raymondo Moros Take 20 mg by mouth daily. * Notice: This list has 2 medication(s) that are the same as other medications prescribed for you. Read the directions carefully, and ask your doctor or other care provider to review them with you. We Performed the Following AMB POC GLUCOSE BLOOD, BY GLUCOSE MONITORING DEVICE [21021 CPT(R)] AMB POC HEMOGLOBIN A1C [01465 CPT(R)] AMB POC LIPID PROFILE [20152 CPT(R)] OCCULT BLOOD, IMMUNOASSAY (FIT) [id number] CPT(R)] Follow-up Instructions Return in about 3 months (around 10/17/2017), or if symptoms worsen or fail to improve. Patient Instructions SmartyContenthart Activation Thank you for requesting access to [employer].  Please follow the instructions below to securely access and download your online medical record. [employer] allows you to send messages to your doctor, view your test results, renew your prescriptions, schedule appointments, and more. How Do I Sign Up? 1. In your internet browser, go to [email or web address] 
2. Click on the First Time User? Click Here link in the Sign In box. You will be redirect to the New Member Sign Up page. 3. Enter your [employer] Access Code exactly as it appears below. You will not need to use this code after youve completed the sign-up process. If you do not sign up before the expiration date, you must request a new code. [employer] Access Code: Activation code not generated Current [employer] Status: Patient Declined (This is the date your [employer] access code will ) 4. Enter the last four digits of your Social Security Number (xxxx) and Date of Birth (mm/dd/yyyy) as indicated and click Submit. You will be taken to the next sign-up page. 5. Create a [employer] ID. This will be your [employer] login ID and cannot be changed, so think of one that is secure and easy to remember. 6. Create a [employer] password. You can change your password at any time. 7. Enter your Password Reset Question and Answer. This can be used at a later time if you forget your password. 8. Enter your e-mail address. You will receive e-mail notification when new information is available in [address] 9. Click Sign Up. You can now view and download portions of your medical record. 10. Click the Download Summary menu link to download a portable copy of your medical information. Additional Information If you have questions, please visit the Frequently Asked Questions section of the [employer] website at [email or web address] Remember, [employer] is NOT to be used for urgent needs. For medical emergencies, dial 911. Please provide this summary of care documentation to your next provider. Your primary care clinician is listed as Curtis Gaspar. If you have any questions after today's visit, please call [phone].

## 2017-07-17 NOTE — PROGRESS NOTES
Adrianne Aviles is a 55 y.o. female and presents with Diabetes; Hypertension; and Cholesterol Problem  . Subjective:  She states she stopped taking Lyrica due to the side effect profile    Hypertension Review:  The patient has hypertension . Diet and Lifestyle: generally follows a low sodium diet, exercises sporadically  Home BP Monitoring: is not measured at home. Pertinent ROS: taking medications as instructed, no medication side effects noted, no TIA's, no chest pain on exertion, no dyspnea on exertion, no swelling of ankles. Dyslipidemia Review:  Patient presents for evaluation of lipids. Compliance with treatment thus far has been excellent. A repeat fasting lipid profile was done. The patient does not use medications that may worsen dyslipidemias . The patient exercises sporadically. The patient is not known to have coexisting coronary artery disease    Diabetes Mellitus Review:  She has diabetes mellitus. Diabetic ROS - medication compliance: compliant all of the time, diabetic diet compliance: compliant all of the time, home glucose monitoring: is performed. Known diabetic complications: none  Cardiovascular risk factors: family history, dyslipidemia, diabetes mellitus, obesity, hypertension  Current diabetic medications include oral agents/insulin   Eye exam current (within one year): no  Weight trend: stable  Prior visit with dietician: no  Current diet: \"healthy\" diet  in general  Current exercise: walking  Current monitoring regimen: home blood tests - daily  Home blood sugar records: trend: stable  Any episodes of hypoglycemia? no  Is She on ACE inhibitor or angiotensin II receptor blocker? Yes   Lab review: orders written for new lab studies as appropriate; see orders. Insomnia Review:  Patient is seen for insomnia.  Treatment includes benzodiazepines   Ongoing symptoms include continued bouts of insomnia,bouts of anxiety and depression are reported  States her depression has decreased since not taking Lyrica. States she needs a tb skin test.    Health maintenance suggests the needs for a test for occult blood      Review of Systems  Constitutional: negative for fevers, chills, anorexia and weight loss  Eyes:   negative for visual disturbance and irritation  ENT:   negative for tinnitus,sore throat,nasal congestion,ear pains. hoarseness  Respiratory:  negative for cough, hemoptysis, dyspnea,wheezing  CV:   negative for chest pain, palpitations, lower extremity edema  GI:   nausea, vomiting,abdominal pain,  Endo:               negative for polyuria,polydipsia,polyphagia,heat intolerance  Genitourinary: negative for frequency, dysuria and hematuria  Integument:  negative for rash and pruritus  Hematologic:  negative for easy bruising and gum/nose bleeding  Musculoskel: negative for myalgias, arthralgias, back pain, muscle weakness, joint pain  Neurological:  negative for headaches, dizziness, vertigo, memory problems and gait   Behavl/Psych: feelings of anxiety, depression, mood changes,insomnia    Past Medical History:   Diagnosis Date    [facility]     Diabetes Mellitus 4/25/2011    Diabetes Mellitus 4/25/2011    GERG - Esophagitis- reflux 4/25/2011    Hypercholesteremia 4/25/2011    Hypercholesterolemia 4/25/2011    Hypertension 4/25/2011    Intertrigo 4/25/2011    Leiomyoma of uterus, unspecified 4/25/2011    Unspecified visual loss 4/25/2011     History reviewed. No pertinent surgical history.   Social History     Social History    Marital status:      Spouse name: N/A    Number of children: N/A    Years of education: N/A     Social History Main Topics    Smoking status: Former Smoker    Smokeless tobacco: Never Used    Alcohol use No    Drug use: No    Sexual activity: No     Other Topics Concern    None     Social History Narrative     Family History   Problem Relation Age of Onset    Diabetes Mother     Hypertension Mother     Diabetes Father Current Outpatient Prescriptions   Medication Sig Dispense Refill    insulin glargine 300 unit/mL (1.5 mL) inpn 60 Units by SubCUTAneous route daily. 1 Pen 12    piroxicam (FELDENE) 20 mg capsule Take 20 mg by mouth daily.  Insulin Needles, Disposable, (NOVOFINE 32) 32 gauge x 1/4\" ndle USE AS DIRECTED 100 Pen Needle 0    atorvastatin (LIPITOR) 40 mg tablet TAKE 1 TABLET BY MOUTH DAILY. 30 Tab 7    losartan (COZAAR) 100 mg tablet Take 1 Tab by mouth daily. 90 Tab 3    amLODIPine (NORVASC) 5 mg tablet TAKE 1 TABLET EVERY DAY 30 Tab 12    omeprazole (PRILOSEC) 40 mg capsule Take 1 Cap by mouth daily.  30 Cap 12    Blood-Glucose Meter monitoring kit CONTOUR METER; Use as directed 1 Kit 0    glucose blood VI test strips (ASCENSIA CONTOUR) strip Daily 50 Package 11    glucose blood VI test strips (BLOOD GLUCOSE TEST) strip As directed   1 Package 12     Allergies   Allergen Reactions    Sulfur Hives    Amoxicillin Nausea Only    Other Medication Unknown (comments)     Zylocaine         Objective:  Visit Vitals    /76    Pulse 63    Temp 98.5 °F (36.9 °C) (Oral)    Resp 14    Ht 5' 2\" (1.575 m)    Wt 180 lb (81.6 kg)    LMP 10/07/2012    SpO2 99%    BMI 32.92 kg/m2     Physical Exam:   General appearance - alert, well appearing, and in no distress  Mental status - alert, oriented to person, place, and time  EYE-CHARLES, EOMI, corneas normal, no foreign bodies  ENT-ENT exam normal, no neck nodes or sinus tenderness  Nose - normal and patent, no erythema, discharge or polyps  Mouth - mucous membranes moist, pharynx normal without lesions  Neck - supple, no significant adenopathy   Chest - clear to auscultation, no wheezes, rales or rhonchi, symmetric air entry   Heart - normal rate, regular rhythm, normal S1, S2, no murmurs, rubs, clicks or gallops   Abdomen - soft,tendeness ruqr, nondistended, no masses or organomegaly  Lymph- no adenopathy palpable  Ext-peripheral pulses normal, no pedal edema, no clubbing or cyanosis  Skin-Warm and dry. no hyperpigmentation, vitiligo, or suspicious lesions  Neuro -alert, oriented, normal speech, no focal findings or movement disorder noted  Neck-normal C-spine, no tenderness, full ROM without pain  Rt.foot-tenderness noted plantar surface. Decrease sensation both feet. Decrease sensation both hands    Results for orders placed or performed in visit on 07/17/17   AMB POC HEMOGLOBIN A1C   Result Value Ref Range    Hemoglobin A1c (POC) 9.5 %   AMB POC GLUCOSE BLOOD, BY GLUCOSE MONITORING DEVICE   Result Value Ref Range    Glucose  mg/dL   AMB POC LIPID PROFILE   Result Value Ref Range    Cholesterol (POC) 121     Triglycerides (POC) 76     HDL Cholesterol (POC) 46     LDL+VLDL 75     LDL Cholesterol (POC) 60 MG/DL    TChol/HDL Ratio (POC) 2.6        Assessment/Plan:    ICD-10-CM ICD-9-CM    1. Controlled type 2 diabetes mellitus with diabetic polyneuropathy, with long-term current use of insulin (HCC) E11.42 250.60 OCCULT BLOOD, IMMUNOASSAY (FIT)    Z79.4 357.2 AMB POC HEMOGLOBIN A1C     V58.67 AMB POC GLUCOSE BLOOD, BY GLUCOSE MONITORING DEVICE      AMB POC LIPID PROFILE      AMB POC URINE, MICROALBUMIN, SEMIQUANT (3 RESULTS)   2. Screen for colon cancer Z12.11 V76.51 OCCULT BLOOD, IMMUNOASSAY (FIT)      AMB POC HEMOGLOBIN A1C      AMB POC GLUCOSE BLOOD, BY GLUCOSE MONITORING DEVICE      AMB POC LIPID PROFILE      AMB POC URINE, MICROALBUMIN, SEMIQUANT (3 RESULTS)   3.  Hypercholesteremia E78.00 272.0 OCCULT BLOOD, IMMUNOASSAY (FIT)      AMB POC HEMOGLOBIN A1C      AMB POC GLUCOSE BLOOD, BY GLUCOSE MONITORING DEVICE      AMB POC LIPID PROFILE      AMB POC URINE, MICROALBUMIN, SEMIQUANT (3 RESULTS)   4. Essential hypertension I10 401.9 OCCULT BLOOD, IMMUNOASSAY (FIT)      AMB POC HEMOGLOBIN A1C      AMB POC GLUCOSE BLOOD, BY GLUCOSE MONITORING DEVICE      AMB POC LIPID PROFILE      AMB POC URINE, MICROALBUMIN, SEMIQUANT (3 RESULTS)     Orders Placed This Encounter    OCCULT BLOOD, IMMUNOASSAY (FIT)    AMB POC HEMOGLOBIN A1C    AMB POC GLUCOSE BLOOD, BY GLUCOSE MONITORING DEVICE    AMB POC LIPID PROFILE     lose weight, increase physical activity, follow low fat diet, follow low salt diet,Take 81mg aspirin daily  Patient Instructions   MyChart Activation    Thank you for requesting access to [employer]. Please follow the instructions below to securely access and download your online medical record. [employer] allows you to send messages to your doctor, view your test results, renew your prescriptions, schedule appointments, and more. How Do I Sign Up? 1. In your internet browser, go to [email or web address]  2. Click on the First Time User? Click Here link in the Sign In box. You will be redirect to the New Member Sign Up page. 3. Enter your [employer] Access Code exactly as it appears below. You will not need to use this code after youve completed the sign-up process. If you do not sign up before the expiration date, you must request a new code. [employer] Access Code: Activation code not generated  Current [employer] Status: Patient Declined (This is the date your [employer] access code will )    4. Enter the last four digits of your Social Security Number (xxxx) and Date of Birth (mm/dd/yyyy) as indicated and click Submit. You will be taken to the next sign-up page. 5. Create a [employer] ID. This will be your [employer] login ID and cannot be changed, so think of one that is secure and easy to remember. 6. Create a [employer] password. You can change your password at any time. 7. Enter your Password Reset Question and Answer. This can be used at a later time if you forget your password. 8. Enter your e-mail address. You will receive e-mail notification when new information is available in [address] 9. Click Sign Up. You can now view and download portions of your medical record.   10. Click the Download Summary menu link to download a portable copy of your medical information. Additional Information    If you have questions, please visit the Frequently Asked Questions section of the [employer] website at [email or web address] Remember, [employer] is NOT to be used for urgent needs. For medical emergencies, dial 911. Follow-up Disposition:  Return in about 3 months (around 10/17/2017), or if symptoms worsen or fail to improve. I have reviewed with the patient details of the assessment and plan and all questions were answered. Relevent patient education was performed    An After Visit Summary was printed and given to the patient.

## 2017-07-17 NOTE — PATIENT INSTRUCTIONS
[email or web address] Activation    Thank you for requesting access to drchrono. Please follow the instructions below to securely access and download your online medical record. drchrono allows you to send messages to your doctor, view your test results, renew your prescriptions, schedule appointments, and more. How Do I Sign Up? 1. In your internet browser, go to [email or web address]  2. Click on the First Time User? Click Here link in the Sign In box. You will be redirect to the New Member Sign Up page. 3. Enter your drchrono Access Code exactly as it appears below. You will not need to use this code after youve completed the sign-up process. If you do not sign up before the expiration date, you must request a new code. drchrono Access Code: Activation code not generated  Current drchrono Status: Patient Declined (This is the date your drchrono access code will )    4. Enter the last four digits of your Social Security Number (xxxx) and Date of Birth (mm/dd/yyyy) as indicated and click Submit. You will be taken to the next sign-up page. 5. Create a drchrono ID. This will be your drchrono login ID and cannot be changed, so think of one that is secure and easy to remember. 6. Create a drchrono password. You can change your password at any time. 7. Enter your Password Reset Question and Answer. This can be used at a later time if you forget your password. 8. Enter your e-mail address. You will receive e-mail notification when new information is available in [address] 9. Click Sign Up. You can now view and download portions of your medical record. 10. Click the Download Summary menu link to download a portable copy of your medical information. Additional Information    If you have questions, please visit the Frequently Asked Questions section of the drchrono website at [email or web address] Remember, drchrono is NOT to be used for urgent needs. For medical emergencies, dial 911.

## 2017-08-08 ENCOUNTER — CLINICAL SUPPORT (OUTPATIENT)
Dept: INTERNAL MEDICINE CLINIC | Age: 57
End: 2017-08-08

## 2017-08-08 DIAGNOSIS — Z11.1 SCREENING-PULMONARY TB: Primary | ICD-10-CM

## 2017-08-08 LAB
MM INDURATION POC: 0 MM (ref 0–5)
PPD POC: NORMAL NEGATIVE

## 2017-08-10 ENCOUNTER — CLINICAL SUPPORT (OUTPATIENT)
Dept: INTERNAL MEDICINE CLINIC | Age: 57
End: 2017-08-10

## 2017-08-10 DIAGNOSIS — Z20.1 EXPOSURE TO TB: Primary | ICD-10-CM

## 2017-10-10 DIAGNOSIS — I10 ESSENTIAL HYPERTENSION WITH GOAL BLOOD PRESSURE LESS THAN 140/90: ICD-10-CM

## 2017-10-10 RX ORDER — AMLODIPINE BESYLATE 5 MG/1
TABLET ORAL
Qty: 30 TAB | Refills: 8 | Status: SHIPPED | OUTPATIENT
Start: 2017-10-10 | End: 2017-10-18 | Stop reason: SDUPTHER

## 2017-10-10 RX ORDER — LOSARTAN POTASSIUM 100 MG/1
TABLET ORAL
Qty: 90 TAB | Refills: 2 | Status: SHIPPED | OUTPATIENT
Start: 2017-10-10 | End: 2018-08-23 | Stop reason: SDUPTHER

## 2017-10-17 ENCOUNTER — OFFICE VISIT (OUTPATIENT)
Dept: INTERNAL MEDICINE CLINIC | Age: 57
End: 2017-10-17

## 2017-10-17 VITALS
BODY MASS INDEX: 33.13 KG/M2 | HEIGHT: 62 IN | DIASTOLIC BLOOD PRESSURE: 70 MMHG | RESPIRATION RATE: 20 BRPM | OXYGEN SATURATION: 100 % | WEIGHT: 180 LBS | HEART RATE: 89 BPM | SYSTOLIC BLOOD PRESSURE: 140 MMHG | TEMPERATURE: 97.9 F

## 2017-10-17 DIAGNOSIS — K21.00 GASTROESOPHAGEAL REFLUX DISEASE WITH ESOPHAGITIS: ICD-10-CM

## 2017-10-17 DIAGNOSIS — K21.9 GASTROESOPHAGEAL REFLUX DISEASE WITHOUT ESOPHAGITIS: ICD-10-CM

## 2017-10-17 DIAGNOSIS — Z79.4 TYPE 2 DIABETES MELLITUS WITHOUT COMPLICATION, WITH LONG-TERM CURRENT USE OF INSULIN (HCC): Primary | ICD-10-CM

## 2017-10-17 DIAGNOSIS — E78.00 HYPERCHOLESTEREMIA: ICD-10-CM

## 2017-10-17 DIAGNOSIS — E11.9 TYPE 2 DIABETES MELLITUS WITHOUT COMPLICATION, WITH LONG-TERM CURRENT USE OF INSULIN (HCC): Primary | ICD-10-CM

## 2017-10-17 DIAGNOSIS — I10 ESSENTIAL HYPERTENSION: ICD-10-CM

## 2017-10-17 LAB
CHOLEST SERPL-MCNC: 148 MG/DL
GLUCOSE POC: 85 MG/DL
HBA1C MFR BLD HPLC: 9.2 %
HDLC SERPL-MCNC: 51 MG/DL
LDL CHOLESTEROL POC: 84 MG/DL
NON-HDL GOAL (POC): 97
TCHOL/HDL RATIO (POC): 2.9
TRIGL SERPL-MCNC: 66 MG/DL

## 2017-10-17 RX ORDER — OMEPRAZOLE 40 MG/1
40 CAPSULE, DELAYED RELEASE ORAL DAILY
Qty: 30 CAP | Refills: 12 | Status: SHIPPED | OUTPATIENT
Start: 2017-10-17 | End: 2022-07-25 | Stop reason: CLARIF

## 2017-10-17 NOTE — PATIENT INSTRUCTIONS
[email or web address] Activation    Thank you for requesting access to Realm. Please follow the instructions below to securely access and download your online medical record. [employer] allows you to send messages to your doctor, view your test results, renew your prescriptions, schedule appointments, and more. How Do I Sign Up? 1. In your internet browser, go to [email or web address]  2. Click on the First Time User? Click Here link in the Sign In box. You will be redirect to the New Member Sign Up page. 3. Enter your Realm Access Code exactly as it appears below. You will not need to use this code after youve completed the sign-up process. If you do not sign up before the expiration date, you must request a new code. Realm Access Code: Activation code not generated  Current Realm Status: Patient Declined (This is the date your Realm access code will )    4. Enter the last four digits of your Social Security Number (xxxx) and Date of Birth (mm/dd/yyyy) as indicated and click Submit. You will be taken to the next sign-up page. 5. Create a Realm ID. This will be your Realm login ID and cannot be changed, so think of one that is secure and easy to remember. 6. Create a Realm password. You can change your password at any time. 7. Enter your Password Reset Question and Answer. This can be used at a later time if you forget your password. 8. Enter your e-mail address. You will receive e-mail notification when new information is available in [address] 9. Click Sign Up. You can now view and download portions of your medical record. 10. Click the Download Summary menu link to download a portable copy of your medical information. Additional Information    If you have questions, please visit the Frequently Asked Questions section of the [employer] website at [email or web address] Remember, Realm is NOT to be used for urgent needs. For medical emergencies, dial 911.

## 2017-10-17 NOTE — PROGRESS NOTES
Meseret Han is a 65 y.o. female and presents with Diabetes and Cholesterol Problem  . Subjective:  She continues to have pains in her feet,states she has seen podiatry    GERD Review:   Patient has a history of gastroesophageal reflux with heartburn. Symptoms have been present for a few months. She denies dysphagia. She  has not lost weight. She denies melena, hematochezia, hematemesis, and coffee ground emesis. This has been associated with fullness after meals. She denies abdominal bloating and none. Medical therapy in the past has included proton pump inhibitors. Hypertension Review:  The patient has hypertension . Diet and Lifestyle: generally follows a low sodium diet, exercises sporadically  Home BP Monitoring: is not measured at home. Pertinent ROS: taking medications as instructed, no medication side effects noted, no TIA's, no chest pain on exertion, no dyspnea on exertion, no swelling of ankles. Dyslipidemia Review:  Patient presents for evaluation of lipids. Compliance with treatment thus far has been excellent. A repeat fasting lipid profile was done. The patient does not use medications that may worsen dyslipidemias . The patient exercises sporadically. The patient is not known to have coexisting coronary artery disease    Diabetes Mellitus Review:  She has diabetes mellitus. Diabetic ROS - medication compliance: compliant all of the time, diabetic diet compliance: compliant all of the time, home glucose monitoring: is performed.    Known diabetic complications: none  Cardiovascular risk factors: family history, dyslipidemia, diabetes mellitus, obesity, hypertension  Current diabetic medications include oral agents/insulin   Eye exam current (within one year): no  Weight trend: stable  Prior visit with dietician: no  Current diet: \"healthy\" diet  in general  Current exercise: walking  Current monitoring regimen: home blood tests - daily  Home blood sugar records: trend: stable  Any episodes of hypoglycemia? no  Is She on ACE inhibitor or angiotensin II receptor blocker? Yes   Lab review: orders written for new lab studies as appropriate; see orders. Review of Systems  Constitutional: negative for fevers, chills, anorexia and weight loss  Eyes:   negative for visual disturbance and irritation  ENT:   negative for tinnitus,sore throat,nasal congestion,ear pains. hoarseness  Respiratory:  negative for cough, hemoptysis, dyspnea,wheezing  CV:   negative for chest pain, palpitations, lower extremity edema  GI:   nausea, vomiting,abdominal pain,  Endo:               negative for polyuria,polydipsia,polyphagia,heat intolerance  Genitourinary: negative for frequency, dysuria and hematuria  Integument:  negative for rash and pruritus  Hematologic:  negative for easy bruising and gum/nose bleeding  Musculoskel: negative for myalgias, arthralgias, back pain, muscle weakness, joint pain  Neurological:  negative for headaches, dizziness, vertigo, memory problems and gait   Behavl/Psych: feelings of anxiety, depression, mood changes,insomnia    Past Medical History:   Diagnosis Date    [facility]     Diabetes Mellitus 4/25/2011    Diabetes Mellitus 4/25/2011    GERG - Esophagitis- reflux 4/25/2011    Hypercholesteremia 4/25/2011    Hypercholesterolemia 4/25/2011    Hypertension 4/25/2011    Intertrigo 4/25/2011    Leiomyoma of uterus, unspecified 4/25/2011    Unspecified visual loss 4/25/2011     History reviewed. No pertinent surgical history.   Social History     Social History    Marital status:      Spouse name: N/A    Number of children: N/A    Years of education: N/A     Social History Main Topics    Smoking status: Former Smoker    Smokeless tobacco: Never Used    Alcohol use No    Drug use: No    Sexual activity: No     Other Topics Concern    None     Social History Narrative     Family History   Problem Relation Age of Onset    Diabetes Mother  Hypertension Mother     Diabetes Father      Current Outpatient Prescriptions   Medication Sig Dispense Refill    insulin glargine (TOUJEO SOLOSTAR) 300 unit/mL (1.5 mL) inpn 40 Units by SubCUTAneous route daily. 1 Pen 12    amLODIPine (NORVASC) 5 mg tablet TAKE 1 TABLET BY MOUTH EVERY DAY 30 Tab 8    atorvastatin (LIPITOR) 40 mg tablet TAKE 1 TABLET BY MOUTH DAILY. 30 Tab 7    omeprazole (PRILOSEC) 40 mg capsule Take 1 Cap by mouth daily.  30 Cap 12    Blood-Glucose Meter monitoring kit CONTOUR METER; Use as directed 1 Kit 0    glucose blood VI test strips (ASCENSIA CONTOUR) strip Daily 50 Package 11    glucose blood VI test strips (BLOOD GLUCOSE TEST) strip As directed   1 Package 12    losartan (COZAAR) 100 mg tablet TAKE 1 TABLET EVERY DAY 90 Tab 2    Insulin Needles, Disposable, (NOVOFINE 32) 32 gauge x 1/4\" ndle USE AS DIRECTED 100 Pen Needle 0     Allergies   Allergen Reactions    Sulfur Hives    Amoxicillin Nausea Only    Other Medication Unknown (comments)     Zylocaine         Objective:  Visit Vitals    /70 (BP 1 Location: Right arm, BP Patient Position: Sitting)    Pulse 89    Temp 97.9 °F (36.6 °C) (Oral)    Resp 20    Ht 5' 2\" (1.575 m)    Wt 180 lb (81.6 kg)    LMP 10/07/2012    SpO2 100%    BMI 32.92 kg/m2     Physical Exam:   General appearance - alert, well appearing, and in no distress  Mental status - alert, oriented to person, place, and time  EYE-CHARLES, EOMI, corneas normal, no foreign bodies  ENT-ENT exam normal, no neck nodes or sinus tenderness  Nose - normal and patent, no erythema, discharge or polyps  Mouth - mucous membranes moist, pharynx normal without lesions  Neck - supple, no significant adenopathy   Chest - clear to auscultation, no wheezes, rales or rhonchi, symmetric air entry   Heart - normal rate, regular rhythm, normal S1, S2, no murmurs, rubs, clicks or gallops   Abdomen - soft,tendeness ruqr, nondistended, no masses or organomegaly  Lymph- no adenopathy palpable  Ext-peripheral pulses normal, no pedal edema, no clubbing or cyanosis  Skin-Warm and dry. no hyperpigmentation, vitiligo, or suspicious lesions  Neuro -alert, oriented, normal speech, no focal findings or movement disorder noted  Neck-normal C-spine, no tenderness, full ROM without pain  Rt.foot-tenderness noted plantar surface. Decrease sensation both feet. Decrease sensation both hands    Results for orders placed or performed in visit on 10/17/17   AMB POC GLUCOSE BLOOD, BY GLUCOSE MONITORING DEVICE   Result Value Ref Range    Glucose POC 85 mg/dL   AMB POC HEMOGLOBIN A1C   Result Value Ref Range    Hemoglobin A1c (POC) 9.2 %   AMB POC LIPID PROFILE   Result Value Ref Range    Cholesterol (POC) 148     Triglycerides (POC) 66     HDL Cholesterol (POC) 51     LDL Cholesterol (POC) 84 MG/DL    Non-HDL Goal (POC) 97     TChol/HDL Ratio (POC) 2.9        Assessment/Plan:    ICD-10-CM ICD-9-CM    1. Type 2 diabetes mellitus without complication, with long-term current use of insulin (HCC) E11.9 250.00 AMB POC GLUCOSE BLOOD, BY GLUCOSE MONITORING DEVICE    Z79.4 V58.67 AMB POC HEMOGLOBIN A1C      AMB POC LIPID PROFILE   2. Essential hypertension I10 401.9    3. Hypercholesteremia E78.00 272.0      Orders Placed This Encounter    AMB POC GLUCOSE BLOOD, BY GLUCOSE MONITORING DEVICE    AMB POC HEMOGLOBIN A1C    AMB POC LIPID PROFILE    insulin glargine (TOUJEO SOLOSTAR) 300 unit/mL (1.5 mL) inpn     Si Units by SubCUTAneous route daily. Dispense:  1 Pen     Refill:  12     lose weight, increase physical activity, follow low fat diet, follow low salt diet,Take 81mg aspirin daily  Patient Instructions   [employer] Activation    Thank you for requesting access to [facility]. Please follow the instructions below to securely access and download your online medical record.  [facility] allows you to send messages to your doctor, view your test results, renew your prescriptions, schedule appointments, and more.    How Do I Sign Up? 1. In your internet browser, go to [email or web address]  2. Click on the First Time User? Click Here link in the Sign In box. You will be redirect to the New Member Sign Up page. 3. Enter your [facility] Access Code exactly as it appears below. You will not need to use this code after youve completed the sign-up process. If you do not sign up before the expiration date, you must request a new code. MyChart Access Code: Activation code not generated  Current [facility] Status: Patient Declined (This is the date your MyChart access code will )    4. Enter the last four digits of your Social Security Number (xxxx) and Date of Birth (mm/dd/yyyy) as indicated and click Submit. You will be taken to the next sign-up page. 5. Create a Inn[employer]at ID. This will be your Link[facility] login ID and cannot be changed, so think of one that is secure and easy to remember. 6. Create a [facility] password. You can change your password at any time. 7. Enter your Password Reset Question and Answer. This can be used at a later time if you forget your password. 8. Enter your e-mail address. You will receive e-mail notification when new information is available in [address] 9. Click Sign Up. You can now view and download portions of your medical record. 10. Click the Download Summary menu link to download a portable copy of your medical information. Additional Information    If you have questions, please visit the Frequently Asked Questions section of the [facility] website at [email or web address] Remember, [facility] is NOT to be used for urgent needs. For medical emergencies, dial 911. Follow-up Disposition:  Return in about 3 months (around 2018), or if symptoms worsen or fail to improve. I have reviewed with the patient details of the assessment and plan and all questions were answered.  Relevent patient education was performed    An After Visit Summary was printed and given to the patient.

## 2017-10-18 ENCOUNTER — OFFICE VISIT (OUTPATIENT)
Dept: INTERNAL MEDICINE CLINIC | Age: 57
End: 2017-10-18

## 2017-10-18 VITALS
BODY MASS INDEX: 34.04 KG/M2 | RESPIRATION RATE: 16 BRPM | DIASTOLIC BLOOD PRESSURE: 100 MMHG | TEMPERATURE: 98.3 F | SYSTOLIC BLOOD PRESSURE: 200 MMHG | OXYGEN SATURATION: 98 % | WEIGHT: 185 LBS | HEART RATE: 69 BPM | HEIGHT: 62 IN

## 2017-10-18 DIAGNOSIS — I10 ACCELERATED HYPERTENSION: ICD-10-CM

## 2017-10-18 RX ORDER — AMLODIPINE BESYLATE 10 MG/1
TABLET ORAL
Qty: 30 TAB | Refills: 12 | Status: SHIPPED | OUTPATIENT
Start: 2017-10-18 | End: 2018-11-02 | Stop reason: SDUPTHER

## 2017-10-18 RX ORDER — METOPROLOL SUCCINATE 25 MG/1
25 TABLET, EXTENDED RELEASE ORAL DAILY
Qty: 30 TAB | Refills: 12 | Status: SHIPPED | OUTPATIENT
Start: 2017-10-18 | End: 2018-11-02 | Stop reason: SDUPTHER

## 2017-10-18 NOTE — PROGRESS NOTES
Lexis Fatima is a 65 y.o. female and presents with Referral Request (eye) and Hypertension  . Subjective:    She has the recent onset of loss of partial vision in the rt.eye. Hypertension Review:  The patient has essential hypertension  Diet and Lifestyle: generally follows a  low sodium diet, exercises sporadically  Home BP Monitoring: is not measured at home. Pertinent ROS: taking medications as instructed, no medication side effects noted, no TIA's, no chest pain on exertion, no dyspnea on exertion, no swelling of ankles. Diabetes Mellitus Review:  She has diabetes mellitus. Diabetic ROS - medication compliance: compliant all of the time, diabetic diet compliance: compliant all of the time, home glucose monitoring: is performed. Known diabetic complications: none  Cardiovascular risk factors: family history, dyslipidemia, diabetes mellitus, obesity, hypertension  Current diabetic medications include /insulin   Eye exam current (within one year): no  Weight trend: stable  Prior visit with dietician: no  Current diet: \"healthy\" diet  in general  Current exercise: walking  Current monitoring regimen: home blood tests - daily  Home blood sugar records: trend: stable  Any episodes of hypoglycemia? no  Is She on ACE inhibitor or angiotensin II receptor blocker? Yes       Dyslipidemia Review:  Patient presents for evaluation of lipids. Compliance with treatment thus far has been excellent. A repeat fasting lipid profile was done. The patient does not use medications that may worsen dyslipidemias (corticosteroids, progestins, anabolic steroids, diuretics, beta-blockers, amiodarone, cyclosporine, olanzapine). The patient exercises some            Hypertension Review:  The patient has essential hypertension  Diet and Lifestyle: generally follows a  low sodium diet, exercises sporadically  Home BP Monitoring: is not measured at home.   Pertinent ROS: taking medications as instructed, no medication side effects noted, no TIA's, no chest pain on exertion, no dyspnea on exertion, no swelling of ankles. Review of Systems  Constitutional: negative for fevers, chills, anorexia and weight loss  Eyes:   negative for visual disturbance and irritation  ENT:   negative for tinnitus,sore throat,nasal congestion,ear pains. hoarseness  Respiratory:  negative for cough, hemoptysis, dyspnea,wheezing  CV:   negative for chest pain, palpitations, lower extremity edema  GI:   negative for nausea, vomiting, diarrhea, abdominal pain,melena  Endo:               negative for polyuria,polydipsia,polyphagia,heat intolerance  Genitourinary: negative for frequency, dysuria and hematuria  Integument:  negative for rash and pruritus  Hematologic:  negative for easy bruising and gum/nose bleeding  Musculoskel: negative for myalgias, arthralgias, back pain, muscle weakness, joint pain  Neurological:  negative for headaches, dizziness, vertigo, memory problems and gait   Behavl/Psych: negative for feelings of anxiety, depression, mood changes    Past Medical History:   Diagnosis Date    [facility]     Diabetes Mellitus 4/25/2011    Diabetes Mellitus 4/25/2011    GERG - Esophagitis- reflux 4/25/2011    Hypercholesteremia 4/25/2011    Hypercholesterolemia 4/25/2011    Hypertension 4/25/2011    Intertrigo 4/25/2011    Leiomyoma of uterus, unspecified 4/25/2011    Unspecified visual loss 4/25/2011     History reviewed. No pertinent surgical history.   Social History     Social History    Marital status:      Spouse name: N/A    Number of children: N/A    Years of education: N/A     Social History Main Topics    Smoking status: Former Smoker    Smokeless tobacco: Never Used    Alcohol use No    Drug use: No    Sexual activity: No     Other Topics Concern    None     Social History Narrative     Family History   Problem Relation Age of Onset    Diabetes Mother     Hypertension Mother     Diabetes Father Current Outpatient Prescriptions   Medication Sig Dispense Refill    insulin glargine (TOUJEO SOLOSTAR) 300 unit/mL (1.5 mL) inpn 40 Units by SubCUTAneous route daily. 1 Pen 12    omeprazole (PRILOSEC) 40 mg capsule Take 1 Cap by mouth daily. 30 Cap 12    amLODIPine (NORVASC) 5 mg tablet TAKE 1 TABLET BY MOUTH EVERY DAY 30 Tab 8    losartan (COZAAR) 100 mg tablet TAKE 1 TABLET EVERY DAY 90 Tab 2    Insulin Needles, Disposable, (NOVOFINE 32) 32 gauge x 1/4\" ndle USE AS DIRECTED 100 Pen Needle 0    atorvastatin (LIPITOR) 40 mg tablet TAKE 1 TABLET BY MOUTH DAILY.  30 Tab 7    Blood-Glucose Meter monitoring kit CONTOUR METER; Use as directed 1 Kit 0    glucose blood VI test strips (ASCENSIA CONTOUR) strip Daily 50 Package 11    glucose blood VI test strips (BLOOD GLUCOSE TEST) strip As directed   1 Package 12     Allergies   Allergen Reactions    Sulfur Hives    Amoxicillin Nausea Only    Other Medication Unknown (comments)     Zylocaine         Objective:  Visit Vitals    BP (!) 200/100    Pulse 69    Temp 98.3 °F (36.8 °C) (Oral)    Resp 16    Ht 5' 2\" (1.575 m)    Wt 185 lb (83.9 kg)    LMP 10/07/2012    SpO2 98%    BMI 33.84 kg/m2     Physical Exam:   General appearance - alert, well appearing, and in no distress  Mental status - alert, oriented to person, place, and time  EYE-CHARLES, EOMI, corneas normal, no foreign bodies  ENT-ENT exam normal, no neck nodes or sinus tenderness  Nose - normal and patent, no erythema, discharge or polyps  Mouth - mucous membranes moist, pharynx normal without lesions  Neck - supple, no significant adenopathy   Chest - clear to auscultation, no wheezes, rales or rhonchi, symmetric air entry   Heart - normal rate, regular rhythm, normal S1, S2, no murmurs, rubs, clicks or gallops   Abdomen - soft, nontender, nondistended, no masses or organomegaly  Lymph- no adenopathy palpable  Ext-peripheral pulses normal, no pedal edema, no clubbing or cyanosis  Skin-Warm and dry. no hyperpigmentation, vitiligo, or suspicious lesions  Neuro -alert, oriented, normal speech, no focal findings or movement disorder noted  Neck-normal C-spine, no tenderness, full ROM without pain  Feet-no nail deformities or callus formation with good pulses noted      Results for orders placed or performed in visit on 10/17/17   AMB POC GLUCOSE BLOOD, BY GLUCOSE MONITORING DEVICE   Result Value Ref Range    Glucose POC 85 mg/dL   AMB POC HEMOGLOBIN A1C   Result Value Ref Range    Hemoglobin A1c (POC) 9.2 %   AMB POC LIPID PROFILE   Result Value Ref Range    Cholesterol (POC) 148     Triglycerides (POC) 66     HDL Cholesterol (POC) 51     LDL Cholesterol (POC) 84 MG/DL    Non-HDL Goal (POC) 97     TChol/HDL Ratio (POC) 2.9        Assessment/Plan:  No diagnosis found. No orders of the defined types were placed in this encounter. lose weight, increase physical activity, follow low fat diet, follow low salt diet,Take 81mg aspirin daily  There are no Patient Instructions on file for this visit. Follow-up Disposition: Not on File      I have reviewed with the patient details of the assessment and plan and all questions were answered. Relevent patient education was performed. The most recent lab findings were reviewed with the patient. An After Visit Summary was printed and given to the patient.

## 2017-10-18 NOTE — PATIENT INSTRUCTIONS
[employer] Activation    Thank you for requesting access to [facility]. Please follow the instructions below to securely access and download your online medical record. [facility] allows you to send messages to your doctor, view your test results, renew your prescriptions, schedule appointments, and more. How Do I Sign Up? 1. In your internet browser, go to [email or web address]  2. Click on the First Time User? Click Here link in the Sign In box. You will be redirect to the New Member Sign Up page. 3. Enter your [facility] Access Code exactly as it appears below. You will not need to use this code after youve completed the sign-up process. If you do not sign up before the expiration date, you must request a new code. [facility] Access Code: Activation code not generated  Current [facility] Status: Patient Declined (This is the date your [facility] access code will )    4. Enter the last four digits of your Social Security Number (xxxx) and Date of Birth (mm/dd/yyyy) as indicated and click Submit. You will be taken to the next sign-up page. 5. Create a [facility] ID. This will be your [facility] login ID and cannot be changed, so think of one that is secure and easy to remember. 6. Create a [facility] password. You can change your password at any time. 7. Enter your Password Reset Question and Answer. This can be used at a later time if you forget your password. 8. Enter your e-mail address. You will receive e-mail notification when new information is available in [address] 9. Click Sign Up. You can now view and download portions of your medical record. 10. Click the Download Summary menu link to download a portable copy of your medical information. Additional Information    If you have questions, please visit the Frequently Asked Questions section of the [facility] website at [email or web address] Remember, [facility] is NOT to be used for urgent needs. For medical emergencies, dial 911.

## 2017-10-18 NOTE — MR AVS SNAPSHOT
Visit Information Date & Time Provider Department Dept. Phone Encounter #  
 10/18/2017  1:45 PM Mike Cohen MD [address] [facility] [phone] [id number] Follow-up Instructions Return in about 2 days (around 10/20/2017), or if symptoms worsen or fail to improve. Follow-up and Disposition History Your Appointments 1/17/2018  8:30 AM  
ROUTINE CARE with Mike Cohen MD  
[facility] - Bud Benoit ([address] [facility]) Appt Note: 3 mo fu  
 [address] [facility],[address] [facility] 7 [phone]  
[phone]  
  
   
 [address] [place] [address],[address] [place] [phone] Upcoming Health Maintenance Date Due Pneumococcal 19-64 Medium Risk (1 of 1 - PPSV23) 8/10/1980 FOBT Q 1 YEAR AGE 50-75 10/23/2016 PAP AKA CERVICAL CYTOLOGY 4/15/2017 EYE EXAM RETINAL OR DILATED Q1 12/7/2017 HEMOGLOBIN A1C Q6M 1/17/2018 BREAST CANCER SCRN MAMMOGRAM 2/26/2018 FOOT EXAM Q1 4/14/2018 MICROALBUMIN Q1 7/17/2018 LIPID PANEL Q1 7/17/2018 DTaP/Tdap/Td series (2 - Td) 2/27/2025 Allergies as of 10/18/2017  Review Complete On: 10/18/2017 By: Esthela Mcneill LPN Severity Noted Reaction Type Reactions Sulfur High 03/10/2016    Hives Amoxicillin  04/19/2013    Nausea Only Other Medication  04/25/2011    Unknown (comments) Arlene Garcia Current Immunizations  Reviewed on 8/8/2017 Name Date  
 TB Skin Test (PPD) Intradermal 8/8/2017 Tdap 2/27/2015 Not reviewed this visit You Were Diagnosed With   
  
 Codes Comments Accelerated hypertension     ICD-10-CM: I10 
ICD-9-CM: 401.0 Vitals BP Pulse Temp Resp Height(growth percentile) Weight(growth percentile) (!) 200/100 69 98.3 °F (36.8 °C) (Oral) 16 5' 2\" (1.575 m) 185 lb (83.9 kg) LMP SpO2 BMI OB Status Smoking Status 10/07/2012 98% 33.84 kg/m2 Postmenopausal Former Smoker Vitals History BMI and BSA Data Body Mass Index Body Surface Area  
 33.84 kg/m 2 1.92 m 2 Preferred Pharmacy Pharmacy Name Phone [id number] [place] - [address] [phone] Your Updated Medication List  
  
   
This list is accurate as of: 10/18/17  3:13 PM.  Always use your most recent med list. amLODIPine 10 mg tablet Commonly known as:  Kim Pace TAKE 1 TABLET BY MOUTH EVERY DAY  
  
 atorvastatin 40 mg tablet Commonly known as:  LIPITOR  
TAKE 1 TABLET BY MOUTH DAILY. Blood-Glucose Meter monitoring kit CONTOUR METER; Use as directed * glucose blood VI test strips strip Commonly known as:  blood glucose test  
As directed * glucose blood VI test strips strip Commonly known as:  Ascensia CONTOUR Daily  
  
 insulin glargine 300 unit/mL (1.5 mL) Inpn Commonly known as:  TOUJEO SOLOSTAR  
40 Units by SubCUTAneous route daily. Insulin Needles (Disposable) 32 gauge x 1/4\" Ndle Commonly known as:  NOVOFINE 32 USE AS DIRECTED  
  
 losartan 100 mg tablet Commonly known as:  COZAAR  
TAKE 1 TABLET EVERY DAY  
  
 metoprolol succinate 25 mg XL tablet Commonly known as:  TOPROL-XL Take 1 Tab by mouth daily. omeprazole 40 mg capsule Commonly known as:  PriLOSEC Take 1 Cap by mouth daily. * Notice: This list has 2 medication(s) that are the same as other medications prescribed for you. Read the directions carefully, and ask your doctor or other care provider to review them with you. Prescriptions Sent to Pharmacy Refills  
 amLODIPine (NORVASC) 10 mg tablet 12 Sig: TAKE 1 TABLET BY MOUTH EVERY DAY Class: Normal  
 Pharmacy: [facility]/pharmacy [id number]  #: [phone]  
 metoprolol succinate (TOPROL-XL) 25 mg XL tablet 12 Sig: Take 1 Tab by mouth daily.   
 Class: Normal  
 Pharmacy: [facility] [address] Ph #: [phone] Route: Oral  
  
We Performed the Following REFERRAL TO OPHTHALMOLOGY [REF57 Custom] Follow-up Instructions Return in about 2 days (around 10/20/2017), or if symptoms worsen or fail to improve. To-Do List   
 10/18/2017 Imaging:  CT HEAD WO CONT   
  
 10/18/2017 Imaging:  DUPLEX CAROTID BILATERAL Referral Information Referral ID Referred By Referred To  
  
 [id number] Lowella Neve [facility] [address] [place], [address] Visits Status Start Date End Date 1 New Request 10/18/17 10/18/18 If your referral has a status of pending review or denied, additional information will be sent to support the outcome of this decision. Patient Instructions [email or web address] Activation Thank you for requesting access to FloTime. Please follow the instructions below to securely access and download your online medical record. FloTime allows you to send messages to your doctor, view your test results, renew your prescriptions, schedule appointments, and more. How Do I Sign Up? 1. In your internet browser, go to [email or web address] 
2. Click on the First Time User? Click Here link in the Sign In box. You will be redirect to the New Member Sign Up page. 3. Enter your FloTime Access Code exactly as it appears below. You will not need to use this code after youve completed the sign-up process. If you do not sign up before the expiration date, you must request a new code. FloTime Access Code: Activation code not generated Current FloTime Status: Patient Declined (This is the date your FloTime access code will ) 4. Enter the last four digits of your Social Security Number (xxxx) and Date of Birth (mm/dd/yyyy) as indicated and click Submit. You will be taken to the next sign-up page. 5. Create a Featherlightt ID. This will be your Metacloud login ID and cannot be changed, so think of one that is secure and easy to remember. 6. Create a Metacloud password. You can change your password at any time. 7. Enter your Password Reset Question and Answer. This can be used at a later time if you forget your password. 8. Enter your e-mail address. You will receive e-mail notification when new information is available in [address] 9. Click Sign Up. You can now view and download portions of your medical record. 10. Click the Download Summary menu link to download a portable copy of your medical information. Additional Information If you have questions, please visit the Frequently Asked Questions section of the Metacloud website at [email or web address] Remember, Metacloud is NOT to be used for urgent needs. For medical emergencies, dial 911. Please provide this summary of care documentation to your next provider. Your primary care clinician is listed as Sommer Fulton. If you have any questions after today's visit, please call [phone].

## 2017-11-20 ENCOUNTER — TELEPHONE (OUTPATIENT)
Dept: INTERNAL MEDICINE CLINIC | Age: 57
End: 2017-11-20

## 2017-11-21 RX ORDER — NAPROXEN 500 MG/1
500 TABLET ORAL 2 TIMES DAILY WITH MEALS
Qty: 60 TAB | Refills: 12 | Status: SHIPPED | OUTPATIENT
Start: 2017-11-21 | End: 2022-07-25 | Stop reason: CLARIF

## 2017-12-18 RX ORDER — PEN NEEDLE, DIABETIC 32 GX 1/4"
NEEDLE, DISPOSABLE MISCELLANEOUS
Qty: 100 PEN NEEDLE | Refills: 3 | Status: ON HOLD | OUTPATIENT
Start: 2017-12-18

## 2017-12-21 ENCOUNTER — HOSPITAL ENCOUNTER (OUTPATIENT)
Dept: MAMMOGRAPHY | Age: 57
Discharge: HOME | End: 2017-12-21
Attending: INTERNAL MEDICINE
Payer: COMMERCIAL

## 2017-12-21 DIAGNOSIS — Z12.39 BREAST SCREENING: ICD-10-CM

## 2017-12-21 PROCEDURE — 77067 SCR MAMMO BI INCL CAD: CPT

## 2018-03-29 DIAGNOSIS — E78.00 HYPERCHOLESTEREMIA: ICD-10-CM

## 2018-03-29 RX ORDER — ATORVASTATIN CALCIUM 40 MG/1
TABLET, FILM COATED ORAL
Qty: 30 TAB | Refills: 7 | Status: SHIPPED | OUTPATIENT
Start: 2018-03-29 | End: 2019-04-12 | Stop reason: SDUPTHER

## 2018-05-31 RX ORDER — FLUCONAZOLE 150 MG/1
TABLET ORAL
Qty: 2 TAB | Refills: 3 | Status: SHIPPED | OUTPATIENT
Start: 2018-05-31 | End: 2022-07-25 | Stop reason: CLARIF

## 2018-08-27 RX ORDER — LOSARTAN POTASSIUM 100 MG/1
TABLET ORAL
Qty: 90 TAB | Refills: 2 | Status: SHIPPED | OUTPATIENT
Start: 2018-08-27 | End: 2019-09-19 | Stop reason: SDUPTHER

## 2018-11-02 DIAGNOSIS — I10 ACCELERATED HYPERTENSION: ICD-10-CM

## 2018-11-02 RX ORDER — METOPROLOL SUCCINATE 25 MG/1
TABLET, EXTENDED RELEASE ORAL
Qty: 30 TAB | Refills: 8 | Status: SHIPPED | OUTPATIENT
Start: 2018-11-02 | End: 2022-07-25 | Stop reason: CLARIF

## 2018-11-02 RX ORDER — AMLODIPINE BESYLATE 10 MG/1
TABLET ORAL
Qty: 30 TAB | Refills: 8 | Status: SHIPPED | OUTPATIENT
Start: 2018-11-02 | End: 2019-10-19 | Stop reason: SDUPTHER

## 2019-04-12 DIAGNOSIS — E78.00 HYPERCHOLESTEREMIA: ICD-10-CM

## 2019-04-15 RX ORDER — ATORVASTATIN CALCIUM 40 MG/1
TABLET, FILM COATED ORAL
Qty: 30 TAB | Refills: 7 | Status: ON HOLD | OUTPATIENT
Start: 2019-04-15

## 2019-09-24 RX ORDER — LOSARTAN POTASSIUM 100 MG/1
TABLET ORAL
Qty: 90 TAB | Refills: 2 | Status: SHIPPED | OUTPATIENT
Start: 2019-09-24 | End: 2022-07-25 | Stop reason: CLARIF

## 2019-10-19 DIAGNOSIS — I10 ACCELERATED HYPERTENSION: ICD-10-CM

## 2019-10-22 RX ORDER — AMLODIPINE BESYLATE 10 MG/1
TABLET ORAL
Qty: 30 TAB | Refills: 8 | Status: SHIPPED | OUTPATIENT
Start: 2019-10-22 | End: 2022-07-25 | Stop reason: CLARIF

## 2021-03-23 ENCOUNTER — HOSPITAL ENCOUNTER (EMERGENCY)
Age: 61
Discharge: OTHER INSTITUTION | End: 2021-03-23
Attending: STUDENT IN AN ORGANIZED HEALTH CARE EDUCATION/TRAINING PROGRAM
Payer: COMMERCIAL

## 2021-03-23 ENCOUNTER — APPOINTMENT (OUTPATIENT)
Dept: GENERAL RADIOLOGY | Age: 61
End: 2021-03-23
Attending: STUDENT IN AN ORGANIZED HEALTH CARE EDUCATION/TRAINING PROGRAM
Payer: COMMERCIAL

## 2021-03-23 VITALS
RESPIRATION RATE: 17 BRPM | DIASTOLIC BLOOD PRESSURE: 74 MMHG | SYSTOLIC BLOOD PRESSURE: 183 MMHG | OXYGEN SATURATION: 100 % | WEIGHT: 174.38 LBS | BODY MASS INDEX: 29.77 KG/M2 | TEMPERATURE: 98.8 F | HEART RATE: 63 BPM | HEIGHT: 64 IN

## 2021-03-23 DIAGNOSIS — N17.9 AKI (ACUTE KIDNEY INJURY) (HCC): Primary | ICD-10-CM

## 2021-03-23 LAB
ALBUMIN SERPL-MCNC: 3.2 G/DL (ref 3.5–5)
ALBUMIN/GLOB SERPL: 0.9 {RATIO} (ref 1.1–2.2)
ALP SERPL-CCNC: 116 U/L (ref 45–117)
ALT SERPL-CCNC: 20 U/L (ref 12–78)
ANION GAP SERPL CALC-SCNC: 4 MMOL/L (ref 5–15)
APPEARANCE UR: CLEAR
AST SERPL-CCNC: 9 U/L (ref 15–37)
BACTERIA URNS QL MICRO: ABNORMAL /HPF
BASOPHILS # BLD: 0 K/UL (ref 0–0.1)
BASOPHILS NFR BLD: 1 % (ref 0–1)
BILIRUB SERPL-MCNC: 0.5 MG/DL (ref 0.2–1)
BILIRUB UR QL: NEGATIVE
BNP SERPL-MCNC: 1280 PG/ML
BUN SERPL-MCNC: 27 MG/DL (ref 6–20)
BUN/CREAT SERPL: 8 (ref 12–20)
CALCIUM SERPL-MCNC: 8.5 MG/DL (ref 8.5–10.1)
CHLORIDE SERPL-SCNC: 110 MMOL/L (ref 97–108)
CO2 SERPL-SCNC: 26 MMOL/L (ref 21–32)
COLOR UR: ABNORMAL
COVID-19 RAPID TEST, COVR: NOT DETECTED
CREAT SERPL-MCNC: 3.49 MG/DL (ref 0.55–1.02)
DIFFERENTIAL METHOD BLD: ABNORMAL
EOSINOPHIL # BLD: 0.3 K/UL (ref 0–0.4)
EOSINOPHIL NFR BLD: 4 % (ref 0–7)
EPITH CASTS URNS QL MICRO: ABNORMAL /LPF
ERYTHROCYTE [DISTWIDTH] IN BLOOD BY AUTOMATED COUNT: 13 % (ref 11.5–14.5)
GLOBULIN SER CALC-MCNC: 3.6 G/DL (ref 2–4)
GLUCOSE SERPL-MCNC: 306 MG/DL (ref 65–100)
GLUCOSE UR STRIP.AUTO-MCNC: 500 MG/DL
HCT VFR BLD AUTO: 29.4 % (ref 35–47)
HGB BLD-MCNC: 9 G/DL (ref 11.5–16)
HGB UR QL STRIP: NEGATIVE
HYALINE CASTS URNS QL MICRO: ABNORMAL /LPF (ref 0–5)
IMM GRANULOCYTES # BLD AUTO: 0 K/UL (ref 0–0.04)
IMM GRANULOCYTES NFR BLD AUTO: 0 % (ref 0–0.5)
KETONES UR QL STRIP.AUTO: NEGATIVE MG/DL
LEUKOCYTE ESTERASE UR QL STRIP.AUTO: NEGATIVE
LYMPHOCYTES # BLD: 1.5 K/UL (ref 0.8–3.5)
LYMPHOCYTES NFR BLD: 21 % (ref 12–49)
MCH RBC QN AUTO: 26.8 PG (ref 26–34)
MCHC RBC AUTO-ENTMCNC: 30.6 G/DL (ref 30–36.5)
MCV RBC AUTO: 87.5 FL (ref 80–99)
MONOCYTES # BLD: 0.3 K/UL (ref 0–1)
MONOCYTES NFR BLD: 4 % (ref 5–13)
NEUTS SEG # BLD: 5 K/UL (ref 1.8–8)
NEUTS SEG NFR BLD: 70 % (ref 32–75)
NITRITE UR QL STRIP.AUTO: NEGATIVE
NRBC # BLD: 0 K/UL (ref 0–0.01)
NRBC BLD-RTO: 0 PER 100 WBC
PH UR STRIP: 6.5 [PH] (ref 5–8)
PLATELET # BLD AUTO: 168 K/UL (ref 150–400)
PMV BLD AUTO: 12.4 FL (ref 8.9–12.9)
POTASSIUM SERPL-SCNC: 4.5 MMOL/L (ref 3.5–5.1)
PROT SERPL-MCNC: 6.8 G/DL (ref 6.4–8.2)
PROT UR STRIP-MCNC: 100 MG/DL
RBC # BLD AUTO: 3.36 M/UL (ref 3.8–5.2)
RBC #/AREA URNS HPF: ABNORMAL /HPF (ref 0–5)
SARS-COV-2, COV2: NORMAL
SODIUM SERPL-SCNC: 140 MMOL/L (ref 136–145)
SOURCE, COVRS: NORMAL
SP GR UR REFRACTOMETRY: 1.01 (ref 1–1.03)
TROPONIN I SERPL-MCNC: <0.05 NG/ML
UROBILINOGEN UR QL STRIP.AUTO: 0.2 EU/DL (ref 0.2–1)
WBC # BLD AUTO: 7.1 K/UL (ref 3.6–11)
WBC URNS QL MICRO: ABNORMAL /HPF (ref 0–4)

## 2021-03-23 PROCEDURE — 74011250637 HC RX REV CODE- 250/637: Performed by: STUDENT IN AN ORGANIZED HEALTH CARE EDUCATION/TRAINING PROGRAM

## 2021-03-23 PROCEDURE — 99285 EMERGENCY DEPT VISIT HI MDM: CPT

## 2021-03-23 PROCEDURE — 87635 SARS-COV-2 COVID-19 AMP PRB: CPT

## 2021-03-23 PROCEDURE — 85025 COMPLETE CBC W/AUTO DIFF WBC: CPT

## 2021-03-23 PROCEDURE — 93005 ELECTROCARDIOGRAM TRACING: CPT

## 2021-03-23 PROCEDURE — 71045 X-RAY EXAM CHEST 1 VIEW: CPT

## 2021-03-23 PROCEDURE — 83880 ASSAY OF NATRIURETIC PEPTIDE: CPT

## 2021-03-23 PROCEDURE — 81001 URINALYSIS AUTO W/SCOPE: CPT

## 2021-03-23 PROCEDURE — 36415 COLL VENOUS BLD VENIPUNCTURE: CPT

## 2021-03-23 PROCEDURE — 80053 COMPREHEN METABOLIC PANEL: CPT

## 2021-03-23 PROCEDURE — 84484 ASSAY OF TROPONIN QUANT: CPT

## 2021-03-23 RX ORDER — CARVEDILOL 12.5 MG/1
25 TABLET ORAL 2 TIMES DAILY WITH MEALS
Status: ON HOLD | COMMUNITY

## 2021-03-23 RX ORDER — SPIRONOLACTONE 25 MG/1
25 TABLET ORAL DAILY
COMMUNITY
End: 2022-07-25 | Stop reason: CLARIF

## 2021-03-23 RX ORDER — ASPIRIN 325 MG
325 TABLET ORAL
Status: COMPLETED | OUTPATIENT
Start: 2021-03-23 | End: 2021-03-23

## 2021-03-23 RX ORDER — NITROGLYCERIN 0.4 MG/1
0.4 TABLET SUBLINGUAL
Status: DISCONTINUED | OUTPATIENT
Start: 2021-03-23 | End: 2021-03-24 | Stop reason: HOSPADM

## 2021-03-23 RX ADMIN — NITROGLYCERIN 0.4 MG: 0.4 TABLET, ORALLY DISINTEGRATING SUBLINGUAL at 18:28

## 2021-03-23 RX ADMIN — ASPIRIN 325 MG: 325 TABLET, FILM COATED ORAL at 19:19

## 2021-03-23 NOTE — ED NOTES
Verbal report received from Petra Smith RN(name) on [employer]. Report consisted of patients Situation, Background, Assessment and Recommendations(SBAR). Information from the following report(s) ED Summary was reviewed with the receiving nurse. Opportunity for questions and clarification was provided.

## 2021-03-24 LAB
ATRIAL RATE: 63 BPM
CALCULATED P AXIS, ECG09: 57 DEGREES
CALCULATED R AXIS, ECG10: 28 DEGREES
CALCULATED T AXIS, ECG11: 111 DEGREES
DIAGNOSIS, 93000: NORMAL
P-R INTERVAL, ECG05: 130 MS
Q-T INTERVAL, ECG07: 444 MS
QRS DURATION, ECG06: 100 MS
QTC CALCULATION (BEZET), ECG08: 454 MS
VENTRICULAR RATE, ECG03: 63 BPM

## 2021-03-24 NOTE — ED NOTES
Pt transferred to [facility] via [facility] due to [employer] insurance being out -of-network for [employer] admitting. Pt discharged in stable condition. Pt is en route to [facility] @ this time.

## 2021-03-24 NOTE — ED PROVIDER NOTES
EMERGENCY DEPARTMENT HISTORY AND PHYSICAL EXAM      Date: 3/23/2021  Patient Name: Drew West    History of Presenting Illness     Chief Complaint   Patient presents with    Chest Pain     Started this am and goes down the middle of her chest and under the left breast.    Numbness     She is also having some tingling and numbness of a couple of fingers on her right         HPI: Drew West, 62 y.o. female presents to the ED with cc of chest pain. This started at about 5 AM this morning. She describes this as a substernal heaviness that radiates to the epigastric region, nonexertional.  She had similar pain about 3 to 4 weeks ago, however that pain improved after taking some Pepto-Bismol. The pain does not seem to be exertional.  She denies any fevers, no associated nausea or diaphoresis. She denies shortness of breath, reports a mild cough productive of clear sputum. She has had slight increased lower extremity swelling over the past several months without any unilateral leg pain or leg swelling. She has been through a lot of stress recently both at work, and also states that she lost her mother to Zenty[place], she has not been eating or drinking as much as she usually does. There are no other complaints, changes, or physical findings at this time. PCP: Almas Hoffmann, DO    No current facility-administered medications on file prior to encounter. Current Outpatient Medications on File Prior to Encounter   Medication Sig Dispense Refill    spironolactone (ALDACTONE) 25 mg tablet Take 25 mg by mouth daily.  carvediloL (COREG) 12.5 mg tablet Take 12.5 mg by mouth two (2) times daily (with meals).       amLODIPine (NORVASC) 10 mg tablet TAKE 1 TABLET BY MOUTH EVERY DAY 30 Tab 8    losartan (COZAAR) 100 mg tablet TAKE 1 TABLET BY MOUTH EVERY DAY 90 Tab 2    atorvastatin (LIPITOR) 40 mg tablet TAKE 1 TABLET BY MOUTH EVERY DAY (Patient taking differently: 80 mg.) 30 Tab 7    NOVOFINE 32 32 gauge x 1/4\" ndle USE AS DIRECTED 100 Pen Needle 3    Blood-Glucose Meter monitoring kit CONTOUR METER; Use as directed 1 Kit 0    glucose blood VI test strips (ASCENSIA CONTOUR) strip Daily 50 Package 11    glucose blood VI test strips (BLOOD GLUCOSE TEST) strip As directed   1 Package 12    metoprolol succinate (TOPROL-XL) 25 mg XL tablet TAKE 1 TABLET BY MOUTH EVERY DAY 30 Tab 8    fluconazole (DIFLUCAN) 150 mg tablet TAKE 1 TABLET BY MOUTH NOW AND 1 TABLET IN 1 WEEK 2 Tab 3    insulin glargine (TOUJEO SOLOSTAR U-300 INSULIN) 300 unit/mL (1.5 mL) inpn 40 Units by SubCUTAneous route daily. 1 Pen 12    naproxen (NAPROSYN) 500 mg tablet Take 1 Tab by mouth two (2) times daily (with meals). 60 Tab 12    omeprazole (PRILOSEC) 40 mg capsule Take 1 Cap by mouth daily. 30 Cap 12       Past History     Past Medical History:  Past Medical History:   Diagnosis Date    Diabetes ([facility] [address]     Diabetes Mellitus 4/25/2011    Diabetes Mellitus 4/25/2011    GERG - Esophagitis- reflux 4/25/2011    Hypercholesteremia 4/25/2011    Hypercholesterolemia 4/25/2011    Hypertension 4/25/2011    Intertrigo 4/25/2011    Leiomyoma of uterus, unspecified 4/25/2011    Unspecified visual loss 4/25/2011       Past Surgical History:  No past surgical history on file. Family History:  Family History   Problem Relation Age of Onset    Diabetes Mother     Hypertension Mother     Diabetes Father        Social History:  Social History     Tobacco Use    Smoking status: Former Smoker    Smokeless tobacco: Never Used   Substance Use Topics    Alcohol use: No    Drug use: No       Allergies:   Allergies   Allergen Reactions    Sulfur Hives    Amoxicillin Nausea Only    Other Medication Unknown (comments)     Zylocaine           Review of Systems   no fever  No eye pain  No ear pain  noshortness of breath  Reports chest pain  no abdominal pain  no dysuria  no leg pain  No rash  No lymphadenopathy  No weight loss    Physical Exam   Physical Exam  HENT:      Head: Normocephalic and atraumatic. Neck:      Musculoskeletal: Neck supple. Cardiovascular:      Rate and Rhythm: Normal rate and regular rhythm. Pulmonary:      Effort: Pulmonary effort is normal.      Breath sounds: Normal breath sounds. Abdominal:      Palpations: Abdomen is soft. Tenderness: There is no abdominal tenderness. Musculoskeletal:      Right lower leg: She exhibits no tenderness. Edema present. Left lower leg: She exhibits no tenderness. Edema present. Skin:     General: Skin is warm and dry. Neurological:      General: No focal deficit present. Mental Status: She is alert and oriented to person, place, and time.    Psychiatric:         Mood and Affect: Mood normal.         Diagnostic Study Results     Labs -     Recent Results (from the past 24 hour(s))   EKG, 12 LEAD, INITIAL    Collection Time: 03/23/21  5:09 PM   Result Value Ref Range    Ventricular Rate 63 BPM    Atrial Rate 63 BPM    P-R Interval 130 ms    QRS Duration 100 ms    Q-T Interval 444 ms    QTC Calculation (Bezet) 454 ms    Calculated P Axis 57 degrees    Calculated R Axis 28 degrees    Calculated T Axis 111 degrees    Diagnosis       Normal sinus rhythm  Minimal voltage criteria for LVH, may be normal variant  T wave abnormality, consider lateral ischemia  No previous ECGs available     CBC WITH AUTOMATED DIFF    Collection Time: 03/23/21  5:25 PM   Result Value Ref Range    WBC 7.1 3.6 - 11.0 K/uL    RBC 3.36 (L) 3.80 - 5.20 M/uL    HGB 9.0 (L) 11.5 - 16.0 g/dL    HCT 29.4 (L) 35.0 - 47.0 %    MCV 87.5 80.0 - 99.0 FL    MCH 26.8 26.0 - 34.0 PG    MCHC 30.6 30.0 - 36.5 g/dL    RDW 13.0 11.5 - 14.5 %    PLATELET 021 746 - 652 K/uL    MPV 12.4 8.9 - 12.9 FL    NRBC 0.0 0  WBC    ABSOLUTE NRBC 0.00 0.00 - 0.01 K/uL    NEUTROPHILS 70 32 - 75 %    LYMPHOCYTES 21 12 - 49 %    MONOCYTES 4 (L) 5 - 13 %    EOSINOPHILS 4 0 - 7 %    BASOPHILS 1 0 - 1 % IMMATURE GRANULOCYTES 0 0.0 - 0.5 %    ABS. NEUTROPHILS 5.0 1.8 - 8.0 K/UL    ABS. LYMPHOCYTES 1.5 0.8 - 3.5 K/UL    ABS. MONOCYTES 0.3 0.0 - 1.0 K/UL    ABS. EOSINOPHILS 0.3 0.0 - 0.4 K/UL    ABS. BASOPHILS 0.0 0.0 - 0.1 K/UL    ABS. IMM. GRANS. 0.0 0.00 - 0.04 K/UL    DF AUTOMATED     METABOLIC PANEL, COMPREHENSIVE    Collection Time: 03/23/21  5:25 PM   Result Value Ref Range    Sodium 140 136 - 145 mmol/L    Potassium 4.5 3.5 - 5.1 mmol/L    Chloride 110 (H) 97 - 108 mmol/L    CO2 26 21 - 32 mmol/L    Anion gap 4 (L) 5 - 15 mmol/L    Glucose 306 (H) 65 - 100 mg/dL    BUN 27 (H) 6 - 20 MG/DL    Creatinine 3.49 (H) 0.55 - 1.02 MG/DL    BUN/Creatinine ratio 8 (L) 12 - 20      GFR est AA 16 (L) >60 ml/min/1.73m2    GFR est non-AA 13 (L) >60 ml/min/1.73m2    Calcium 8.5 8.5 - 10.1 MG/DL    Bilirubin, total 0.5 0.2 - 1.0 MG/DL    ALT (SGPT) 20 12 - 78 U/L    AST (SGOT) 9 (L) 15 - 37 U/L    Alk. phosphatase 116 45 - 117 U/L    Protein, total 6.8 6.4 - 8.2 g/dL    Albumin 3.2 (L) 3.5 - 5.0 g/dL    Globulin 3.6 2.0 - 4.0 g/dL    A-G Ratio 0.9 (L) 1.1 - 2.2     TROPONIN I    Collection Time: 03/23/21  5:25 PM   Result Value Ref Range    Troponin-I, Qt. <0.05 <0.05 ng/mL   NT-PRO BNP    Collection Time: 03/23/21  5:25 PM   Result Value Ref Range    NT pro-BNP 1,280 (H) <125 PG/ML       Radiologic Studies -   XR CHEST PORT   Final Result   No acute cardiopulmonary process. CT Results  (Last 48 hours)    None        CXR Results  (Last 48 hours)               03/23/21 1750  XR CHEST PORT Final result    Impression:  No acute cardiopulmonary process. Narrative:  EXAM: XR CHEST PORT       INDICATION: chest pain       COMPARISON: None. FINDINGS: A portable AP radiograph of the chest was obtained at hours. The   patient is on a cardiac monitor. The lungs are clear. The cardiac and   mediastinal contours and pulmonary vascularity are normal.  The bones and soft   tissues are grossly within normal limits. Medical Decision Making   I am the first provider for this patient. I reviewed the vital signs, available nursing notes, past medical history, past surgical history, family history and social history. Vital Signs-Reviewed the patient's vital signs. Patient Vitals for the past 24 hrs:   Temp Pulse Resp BP SpO2   03/23/21 1915 99.2 °F (37.3 °C) 65 20 (!) 171/80 100 %   03/23/21 1800 -- 62 19 (!) 184/79 100 %   03/23/21 1702 98.4 °F (36.9 °C) 63 18 (!) 186/67 100 %         Provider Notes (Medical Decision Making):   23-year-old female presenting with chest pain. Differential includes GERD, ACS, musculoskeletal chest pain, pleuritic pain. Will assess for electrolyte/metabolic abnormalities in the setting of recent poor p.o. Symptoms are not consistent with pneumonia, CHF or any other cardiopulmonary emergency. Patient is given sublingual nitroglycerin. ED Course:     Initial assessment performed. The patients presenting problems have been discussed, and they are in agreement with the care plan formulated and outlined with them. I have encouraged them to ask questions as they arise throughout their visit. EKG is performed at 17: 09, shows sinus rhythm at a rate of 63, , , QTc 454, axis upright, no ST segment elevation or depression concerning for ACS, this is interpreted as normal sinus rhythm. CBC shows anemia of 9, negative for leukocytosis, basic metabolic panel with creatinine elevated at 3.49, this is new RAYMOND per chart review, prior creatinine is 1.22, prior to that was normal.  Potassium is normal at 4.5, slightly hyperglycemic, otherwise no worrisome electrolyte abnormalities, troponin is negative, chest x-ray unremarkable. After nitroglycerin, patient is resting comfortably and states that her chest pain has resolved. UA is sent, patient will be bladder scanned after voiding.   According to her insurance, she cannot be admitted here, she agrees for transport to [facility] facility. She will be transferred to [facility], I have spoken with the ED physician Dr. Chadd Casey. Critical Care Time:         Disposition:  Transfer    PLAN:  1. Current Discharge Medication List        2.    Follow-up Information    None       Return to ED if worse     Diagnosis     Clinical Impression: RAYMOND, acute atypical chest pain

## 2021-12-09 ENCOUNTER — HOSPITAL ENCOUNTER (OUTPATIENT)
Dept: INFUSION THERAPY | Age: 61
Discharge: HOME | End: 2021-12-09

## 2021-12-16 ENCOUNTER — HOSPITAL ENCOUNTER (OUTPATIENT)
Dept: INFUSION THERAPY | Age: 61
Discharge: HOME | End: 2021-12-16

## 2022-07-25 RX ORDER — INSULIN GLARGINE 100 [IU]/ML
26 INJECTION, SOLUTION SUBCUTANEOUS
Status: ON HOLD | COMMUNITY

## 2022-07-25 RX ORDER — SODIUM BICARBONATE 650 MG/1
650 TABLET ORAL 2 TIMES DAILY
Status: ON HOLD | COMMUNITY

## 2022-07-25 RX ORDER — BUMETANIDE 1 MG/1
1 TABLET ORAL DAILY
Status: ON HOLD | COMMUNITY

## 2022-07-25 RX ORDER — NIFEDIPINE 30 MG/1
30 TABLET, FILM COATED, EXTENDED RELEASE ORAL 2 TIMES DAILY
Status: ON HOLD | COMMUNITY

## 2022-07-25 RX ORDER — GUAIFENESIN 100 MG/5ML
81 LIQUID (ML) ORAL DAILY
Status: ON HOLD | COMMUNITY

## 2022-08-23 ENCOUNTER — ANESTHESIA (OUTPATIENT)
Dept: ENDOSCOPY | Age: 62
End: 2022-08-23
Payer: MEDICAID

## 2022-08-23 ENCOUNTER — HOSPITAL ENCOUNTER (OUTPATIENT)
Age: 62
Setting detail: OUTPATIENT SURGERY
Discharge: HOME | End: 2022-08-23
Attending: INTERNAL MEDICINE | Admitting: INTERNAL MEDICINE
Payer: MEDICAID

## 2022-08-23 ENCOUNTER — ANESTHESIA EVENT (OUTPATIENT)
Dept: ENDOSCOPY | Age: 62
End: 2022-08-23
Payer: MEDICAID

## 2022-08-23 VITALS
WEIGHT: 194.7 LBS | SYSTOLIC BLOOD PRESSURE: 169 MMHG | BODY MASS INDEX: 33.24 KG/M2 | OXYGEN SATURATION: 97 % | HEIGHT: 64 IN | HEART RATE: 55 BPM | DIASTOLIC BLOOD PRESSURE: 77 MMHG | RESPIRATION RATE: 17 BRPM | TEMPERATURE: 97.8 F

## 2022-08-23 LAB
GLUCOSE BLD STRIP.AUTO-MCNC: 119 MG/DL (ref 65–117)
SERVICE CMNT-IMP: ABNORMAL

## 2022-08-23 PROCEDURE — 82962 GLUCOSE BLOOD TEST: CPT

## 2022-08-23 PROCEDURE — 88305 TISSUE EXAM BY PATHOLOGIST: CPT

## 2022-08-23 PROCEDURE — 74011250636 HC RX REV CODE- 250/636: Performed by: NURSE ANESTHETIST, CERTIFIED REGISTERED

## 2022-08-23 PROCEDURE — 76060000031 HC ANESTHESIA FIRST 0.5 HR: Performed by: INTERNAL MEDICINE

## 2022-08-23 PROCEDURE — 74011250636 HC RX REV CODE- 250/636: Performed by: INTERNAL MEDICINE

## 2022-08-23 PROCEDURE — 2709999900 HC NON-CHARGEABLE SUPPLY: Performed by: INTERNAL MEDICINE

## 2022-08-23 PROCEDURE — 76040000019: Performed by: INTERNAL MEDICINE

## 2022-08-23 PROCEDURE — 77030013992 HC SNR POLYP ENDOSC BSC -B: Performed by: INTERNAL MEDICINE

## 2022-08-23 RX ORDER — PROPOFOL 10 MG/ML
INJECTION, EMULSION INTRAVENOUS AS NEEDED
Status: DISCONTINUED | OUTPATIENT
Start: 2022-08-23 | End: 2022-08-23 | Stop reason: HOSPADM

## 2022-08-23 RX ORDER — SODIUM CHLORIDE 9 MG/ML
125 INJECTION, SOLUTION INTRAVENOUS CONTINUOUS
Status: DISCONTINUED | OUTPATIENT
Start: 2022-08-23 | End: 2022-08-23 | Stop reason: HOSPADM

## 2022-08-23 RX ORDER — ATROPINE SULFATE 0.1 MG/ML
0.5 INJECTION INTRAVENOUS
Status: DISCONTINUED | OUTPATIENT
Start: 2022-08-23 | End: 2022-08-23 | Stop reason: HOSPADM

## 2022-08-23 RX ORDER — MIDAZOLAM HYDROCHLORIDE 1 MG/ML
.25-5 INJECTION, SOLUTION INTRAMUSCULAR; INTRAVENOUS
Status: DISCONTINUED | OUTPATIENT
Start: 2022-08-23 | End: 2022-08-23 | Stop reason: HOSPADM

## 2022-08-23 RX ORDER — SODIUM CHLORIDE 9 MG/ML
INJECTION, SOLUTION INTRAVENOUS
Status: DISCONTINUED | OUTPATIENT
Start: 2022-08-23 | End: 2022-08-23 | Stop reason: HOSPADM

## 2022-08-23 RX ORDER — DEXTROMETHORPHAN/PSEUDOEPHED 2.5-7.5/.8
1.2 DROPS ORAL
Status: DISCONTINUED | OUTPATIENT
Start: 2022-08-23 | End: 2022-08-23 | Stop reason: HOSPADM

## 2022-08-23 RX ORDER — FLUMAZENIL 0.1 MG/ML
0.2 INJECTION INTRAVENOUS
Status: DISCONTINUED | OUTPATIENT
Start: 2022-08-23 | End: 2022-08-23 | Stop reason: HOSPADM

## 2022-08-23 RX ORDER — NALOXONE HYDROCHLORIDE 0.4 MG/ML
0.4 INJECTION, SOLUTION INTRAMUSCULAR; INTRAVENOUS; SUBCUTANEOUS
Status: DISCONTINUED | OUTPATIENT
Start: 2022-08-23 | End: 2022-08-23 | Stop reason: HOSPADM

## 2022-08-23 RX ORDER — EPINEPHRINE 0.1 MG/ML
1 INJECTION INTRACARDIAC; INTRAVENOUS
Status: DISCONTINUED | OUTPATIENT
Start: 2022-08-23 | End: 2022-08-23 | Stop reason: HOSPADM

## 2022-08-23 RX ORDER — DIPHENHYDRAMINE HYDROCHLORIDE 50 MG/ML
50 INJECTION, SOLUTION INTRAMUSCULAR; INTRAVENOUS ONCE
Status: DISCONTINUED | OUTPATIENT
Start: 2022-08-23 | End: 2022-08-23 | Stop reason: HOSPADM

## 2022-08-23 RX ADMIN — PROPOFOL 50 MG: 10 INJECTION, EMULSION INTRAVENOUS at 11:12

## 2022-08-23 RX ADMIN — PROPOFOL 50 MG: 10 INJECTION, EMULSION INTRAVENOUS at 11:06

## 2022-08-23 RX ADMIN — SODIUM CHLORIDE 125 ML/HR: 9 INJECTION, SOLUTION INTRAVENOUS at 10:57

## 2022-08-23 RX ADMIN — PROPOFOL 50 MG: 10 INJECTION, EMULSION INTRAVENOUS at 11:05

## 2022-08-23 RX ADMIN — SODIUM CHLORIDE: 0.9 INJECTION, SOLUTION INTRAVENOUS at 10:45

## 2022-08-23 NOTE — H&P
[facility]  Outpatient History and Physical    Patient: Natty Guzman    Physician: Anselmo Uriarte MD    Vital Signs: Blood pressure (!) 162/73, pulse (!) 54, temperature 98 °F (36.7 °C), resp. rate 13, height 5' 4\" (1.626 m), weight 88.3 kg (194 lb 11.2 oz), last menstrual period 10/07/2012, SpO2 99 %, not currently breastfeeding. Allergies: Allergies   Allergen Reactions    Sulfur Hives    Amoxicillin Nausea Only    Other Medication Unknown (comments)     Zylocaine         Chief Complaint: screening colonoscopy    History:  Past Medical History:   Diagnosis Date    Diabetes ([facility] [address]     Diabetes Mellitus 2011    Diabetes Mellitus 2011    GERG - Esophagitis- reflux 2011    Hypercholesteremia 2011    Hypercholesterolemia 2011    Hypertension 2011    Intertrigo 2011    Leiomyoma of uterus, unspecified 2011    Unspecified visual loss 2011    rt eye      History reviewed. No pertinent surgical history. Social History     Socioeconomic History    Marital status:    Tobacco Use    Smoking status: Former     Types: Cigarettes     Quit date:      Years since quittin.6    Smokeless tobacco: Never   Vaping Use    Vaping Use: Never used   Substance and Sexual Activity    Alcohol use: No    Drug use: No    Sexual activity: Never      Family History   Problem Relation Age of Onset    Diabetes Mother     Hypertension Mother     Diabetes Father        Medications:   Prior to Admission medications    Medication Sig Start Date End Date Taking? Authorizing Provider   NIFEdipine ER (ADALAT CC) 30 mg ER tablet Take 30 mg by mouth two (2) times a day. Yes Provider, Historical   aspirin 81 mg chewable tablet Take 81 mg by mouth in the morning. Yes Provider, Historical   bumetanide (BUMEX) 1 mg tablet Take 1 mg by mouth in the morning.    Yes Provider, Historical   sodium bicarbonate 650 mg tablet Take 650 mg by mouth two (2) times a day. Yes Provider, Historical   insulin glargine (LANTUS,BASAGLAR) 100 unit/mL (3 mL) inpn 26 Units by SubCUTAneous route nightly. Yes Provider, Historical   carvediloL (COREG) 12.5 mg tablet Take 25 mg by mouth two (2) times daily (with meals). Yes Other, MD Vivi   atorvastatin (LIPITOR) 40 mg tablet TAKE 1 TABLET BY MOUTH EVERY DAY  Patient taking differently: 80 mg. 4/15/19  Yes Gilson Daily MD   NOVOFINE 32 32 gauge x 1/4\" ndle USE AS DIRECTED 12/18/17  Yes Gilson Daily MD   Blood-Glucose Meter monitoring kit CONTOUR METER; Use as directed 5/3/13  Yes Gilson Daily MD   glucose blood VI test strips (ASCENSIA CONTOUR) strip Daily 4/30/13  Yes Gilson Daily MD   glucose blood VI test strips (BLOOD GLUCOSE TEST) strip As directed   2/24/12  Yes Gilson Daily MD       Physical Exam:   General: alert, no distress   HEENT: Head: Normocephalic, no lesions, without obvious abnormality. Heart: regular rate and rhythm, S1, S2 normal, no murmur, click, rub or gallop   Lungs: chest clear, no wheezing, rales, normal symmetric air entry   Abdominal: Bowel sounds are normal, liver is not enlarged, spleen is not enlarged   Neurological: Grossly normal   Extremities: extremities normal, atraumatic, no cyanosis or edema     Plan of Care/Planned Procedure: colonoscopy    The heart, lungs and mental status were satisfactory for the administration of MAC sedation and for the procedure. Informed consent was obtained for the procedure, including sedation. Risks of perforation, hemorrhage, adverse drug reaction, and aspiration were discussed. The risks, benefits and alternatives were again reiterated to the patient to include the risk of infection, bleeding, medication reaction, aspiration, perforation which could require immediate surgery, cardiopulmonary complication, issues with anesthesia and death.     The patient was counseled at length about the risks of martha Covid-19 in the flora-operative and post-operative states including the recovery window of their procedure. The patient was made aware that martha Covid-19 after a surgical procedure may worsen their prognosis for recovering from the virus and lend to a higher morbidity and or mortality risk. The patient was given the options of postponing their procedure. All of the risks, benefits, and alternatives were discussed. The patient does  wish to proceed with the procedure.

## 2022-08-23 NOTE — ROUTINE PROCESS
Vilinda Gowers  1960  [id number]    Situation:  Verbal report received from: Cameron Sosa  Procedure: Procedure(s):  COLONOSCOPY  ENDOSCOPIC POLYPECTOMY    Background:    Preoperative diagnosis: Screening  Postoperative diagnosis: colon - polyp    :  Dr. Aide Castle  Assistant(s): Endoscopy Technician-1: Bk Love  Endoscopy RN-1: Sean Dance, RN    Specimens:   ID Type Source Tests Collected by Time Destination   1 : Colon, Transverse polyp Preservative Colon, Transverse  [facility], MD 8/23/2022 1115 Pathology     H. Pylori  no    Assessment:  Intra-procedure medications     Anesthesia gave intra-procedure sedation and medications, see anesthesia flow sheet yes    Intravenous fluids: NS@ KVO     Vital signs stable     Abdominal assessment: round and soft     Recommendation:  Discharge patient per MD order.   Family   Permission to share finding with family or friend yes

## 2022-08-23 NOTE — ANESTHESIA PREPROCEDURE EVALUATION
Anesthetic History   No history of anesthetic complications            Review of Systems / Medical History  Patient summary reviewed, nursing notes reviewed and pertinent labs reviewed    Pulmonary  Within defined limits                 Neuro/Psych   Within defined limits           Cardiovascular    Hypertension          Hyperlipidemia    Exercise tolerance: >4 METS     GI/Hepatic/Renal     GERD          Comments: Early satiety, wt loss Endo/Other    Diabetes: type 2    Obesity     Other Findings              Physical Exam    Airway  Mallampati: I  TM Distance: 4 - 6 cm  Neck ROM: normal range of motion   Mouth opening: Normal     Cardiovascular    Rhythm: regular  Rate: normal         Dental  No notable dental hx       Pulmonary  Breath sounds clear to auscultation               Abdominal  GI exam deferred       Other Findings            Anesthetic Plan    ASA: 3  Anesthesia type: MAC          Induction: Intravenous  Anesthetic plan and risks discussed with: Patient

## 2022-08-23 NOTE — PROCEDURES
Colonoscopy Procedure Note    Indications:   See Preoperative Diagnosis above  Referring Physician: María Reyes DO  Anesthesia/Sedation: MAC anesthesia Propofol  Endoscopist:  Dr. Hebert Judge  Assistant:  Endoscopy Technician-1: Michele Blas  Endoscopy RN-1: Blanca Tran RN  Preoperative diagnosis: Screening  Postoperative diagnosis: colon - polyp    Procedure in Detail:  Informed consent was obtained for the procedure, including sedation. Risks of perforation, hemorrhage, adverse drug reaction, and aspiration were discussed. The patient was placed in the left lateral decubitus position. Based on the pre-procedure assessment, including review of the patient's medical history, medications, allergies, and review of systems, she had been deemed to be an appropriate candidate for moderate sedation; she was therefore sedated with the medications listed above. The patient was monitored continuously with ECG tracing, pulse oximetry, blood pressure monitoring, and direct observations. A rectal examination was performed. The [id number] was inserted into the rectum and advanced under direct vision to the cecum, which was identified by the ileocecal valve and appendiceal orifice. The quality of the colonic preparation was inadequate BPS 1/1/1 3. A careful inspection was made as the colonoscope was withdrawn, including a retroflexed view of the rectum; findings and interventions are described below. Appropriate photodocumentation was obtained. Findings:  JANINE: unremarkable  Entire colon filled with semiliquid and semisolid stool. Two small sessile polyps 2-4mm removed with cold snare polypectomy in the proximal transverse colon. Very limited views. EBL: minimal    Complications: None; patient tolerated the procedure well. Recommendations:     - Await pathology.   - Repeat colonoscopy poor prep protocol  - avoid NSAIDs for 5d  - discussed w/ Alina      Signed By: Jef Camarena Sarah Singer MD                        August 23, 2022

## 2022-08-23 NOTE — PROGRESS NOTES
Endoscope was pre-cleaned at the bedside immediately following procedure by Purvi Olguin ET    Medications       propofol 10 mg/mL (mg)       Date/Time Rate/Dose/Volume Action Route Admin User Audit       08/23/22  1105 50 mg Given IntraVENous Maria Guadalupe Freeze, CRNA       1106 50 mg Given IntraVENous Maria Guadalupe Freeze, CRNA       1112 50 mg Given IntraVENous Maria Guadalupe Freeze, CRNA                NS (mL)       Date/Time Rate/Dose/Volume Action Route Admin User Audit       08/23/22  1045  New Bag IntraVENous Maria Guadalupe Freeze, CRNA       1116 300 mL Anesthesia Volume Adjustment IntraVENous Maria Guadalupe Freeze, CRNA                    Glasses returned to pateint.

## 2022-08-23 NOTE — ANESTHESIA POSTPROCEDURE EVALUATION
Procedure(s):  COLONOSCOPY  ENDOSCOPIC POLYPECTOMY. MAC    Anesthesia Post Evaluation      Multimodal analgesia: multimodal analgesia used between 6 hours prior to anesthesia start to PACU discharge  Patient location during evaluation: PACU  Level of consciousness: sleepy but conscious  Pain management: adequate  Airway patency: patent  Anesthetic complications: no  Cardiovascular status: acceptable  Respiratory status: acceptable  Hydration status: acceptable  Comments: +Post-Anesthesia Evaluation and Assessment    Patient: Sumeet Lara MRN: [id number]  SSN: [id number]   YOB: 1960  Age: 65 y.o. Sex: female      Cardiovascular Function/Vital Signs    BP (!) 169/77   Pulse (!) 55   Temp 36.6 °C (97.8 °F)   Resp 17   Ht 5' 4\" (1.626 m)   Wt 88.3 kg (194 lb 11.2 oz)   SpO2 97%   Breastfeeding No   BMI 33.42 kg/m²     Patient is status post Procedure(s):  COLONOSCOPY  ENDOSCOPIC POLYPECTOMY. Nausea/Vomiting: Controlled. Postoperative hydration reviewed and adequate. Pain:  Pain Scale 1: Numeric (0 - 10) (08/23/22 1136)  Pain Intensity 1: 0 (08/23/22 1136)   Managed. Neurological Status: At baseline. Mental Status and Level of Consciousness: Arousable. Pulmonary Status:   O2 Device: None (Room air) (08/23/22 1136)   Adequate oxygenation and airway patent. Complications related to anesthesia: None    Post-anesthesia assessment completed. No concerns. Signed By: Meseret Flores MD    8/23/2022  Post anesthesia nausea and vomiting:  controlled  Final Post Anesthesia Temperature Assessment:  Normothermia (36.0-37.5 degrees C)      INITIAL Post-op Vital signs:   Vitals Value Taken Time   /72 08/23/22 1142   Temp 36.6 °C (97.8 °F) 08/23/22 1125   Pulse 54 08/23/22 1144   Resp 15 08/23/22 1144   SpO2 97 % 08/23/22 1144   Vitals shown include unvalidated device data.

## 2022-08-23 NOTE — DISCHARGE INSTRUCTIONS
Garima Ornelas  [id number]  1960    It was my pleasure seeing you for your procedure. You will also receive a summary report with the findings from this procedure and any further recommendations. If you had polyps removed or biopsies taken during your procedure, you will receive a separate letter from me within the next 2 weeks. If you don't receive this letter or if you have any questions, please call my office [phone]. Please take note of the post procedure instructions listed below. Best Wishes,    Dr. Shelli Mann    These instructions give you information on caring for yourself after your procedure. Call your doctor if you have any problems or questions after your procedure. HOME CARE  Walk if you have belly cramping or gas. Walking will help get rid of the air and reduce the bloated feeling in your belly (abdomen). Your IV site (where you received drugs) may be tender to touch. Place warm towels on the site; keep your arm up on two pillows if you have any swelling or soreness in the area. You may shower. ACTIVITY:  Take frequent rest periods and move at a slower pace for the next 24 hours. .  You may resume your regular activity tomorrow if you are feeling back to normal.  Do not drive or ride a bicycle for at least 24 hours (because of the medicine (anesthesia) used during the test). Do not sign any important legal documents or use or operate any machinery for 24 hours  Do not take sleeping medicines/nerve drugs for 24 hours unless the doctor tells you. You can return to work/school tomorrow unless otherwise instructed. NUTRITION:  Drink plenty of fluids to keep your pee (urine) clear or pale yellow  Begin with a light meal and progress to your normal diet. Heavy or fried foods are harder to digest and may make you feel sick to your stomach (nauseated).   Once you are feeling back to normal, you may resume your normal diet as instructed by your doctor. Avoid alcoholic beverages for 24 hours or as instructed. IF YOU HAD BIOPSIES TAKEN OR POLYPS REMOVED DURING THE PROCEDURE:  For the next 7 days, avoid all non-steroidal antiinflammatory medications such as Ibuprofen, Motrin, Advil, Alleve, Rocío-seltzer, Goody's powder, BC powder. If you do not have an heart condition that requires you to take a daily aspirin, you should avoid taking aspirin for 7 days. Eat a soft diet for 24 hours. Monitor your stools for any blood or dark black, tar-like, stools as this may be a sign of bleeding and if you see any blood, notify your doctor immediately. GET HELP RIGHT AWAY AND SEEK IMMEDIATE MEDICAL CARE IF:  You have more than a spotting of blood in your stool. You pass clumps of tissue (blood clots) or fill the toilet with blood. Your belly is painfully swollen or puffy (abdominal distention). You throw up (vomit). You have a fever. You have redness, pain or swelling at the IV site that last greater than two days. You have abdominal pain or discomfort that is severe or gets worse throughout the day. Post-procedure diagnosis:  colon - polyp    Post-procedure recommendations:  Await pathology.   - Repeat colonoscopy poor prep protocol  - avoid NSAIDs for 5d    Patient Education on Sedation / Analgesia Administered for Procedure      For 24 hours after general anesthesia or intravenous analgesia / sedation:  Have someone responsible help you with your care  Limit your activities  Do not drive and operate hazardous machinery  Do not make important personal, legal or business decisions  Do not drink alcoholic beverages  If you have not urinated within 8 hours after discharge, please contact your physician  Resume your medications unless otherwise instructed    For 24 hours after general anesthesia or intravenous analgesia / sedation  you may experience:  Drowsiness, dizziness, sleepiness, or confusion  Difficulty remembering or delayed reaction times  Difficulty with your balance, especially while walking, move slowly and carefully, do not make sudden position changes  Difficulty focusing or blurred vision    You may not be aware of slight changes in your behavior and/or your reaction time because of the medication used during and after your procedure. Report the following to your physician:  Excessive pain, swelling, redness or odor of or around the surgical area  Temperature over 100.5  Nausea and vomiting lasting longer than 4 hours or if unable to take medications  Any signs of decreased circulation or nerve impairment to extremity: change in color, persistent numbness, tingling, coldness or increase pain  Any questions or concerns    IF YOU REPORT TO AN EMERGENCY ROOM, DOCTOR'S OFFICE OR HOSPITAL WITHIN 24 HOURS AFTER YOUR PROCEDURE, BRING THIS SHEET AND YOUR AFTER VISIT SUMMARY WITH YOU AND GIVE IT TO THE PHYSICIAN OR NURSE ATTENDING YOU. Colon Polyps: Care Instructions  Your Care Instructions     Colon polyps are growths in the colon or the rectum. The cause of most colon polyps is not known, and most people who get them do not have any problems. But a certain kind can turn into cancer. For this reason, regular testing for colon polyps is important for people as they get older. It is also important for anyone who has an increased risk for colon cancer. Polyps are usually found through routine colon cancer screening tests. Although most colon polyps are not cancerous, they are usually removed and then tested for cancer. Screening for colon cancer saves lives because the cancer can usually be cured if it is caught early. If you have a polyp that is the type that can turn into cancer, you may need more tests to examine your entire colon. The doctor will remove any other polyps that he or she finds, and you will be tested more often. Follow-up care is a key part of your treatment and safety.  Be sure to make and go to all appointments, and call your doctor if you are having problems. It's also a good idea to know your test results and keep a list of the medicines you take. How can you care for yourself at home? Regular exams to look for colon polyps are the best way to prevent polyps from turning into colon cancer. These can include stool tests, sigmoidoscopy, colonoscopy, and CT colonography. Talk with your doctor about a testing schedule that is right for you. To prevent polyps  There is no home treatment that can prevent colon polyps. But these steps may help lower your risk for cancer. Stay active. Being active can help you get to and stay at a healthy weight. Try to exercise on most days of the week. Walking is a good choice. Eat well. Choose a variety of vegetables, fruits, legumes (such as peas and beans), fish, poultry, and whole grains. Do not smoke. If you need help quitting, talk to your doctor about stop-smoking programs and medicines. These can increase your chances of quitting for good. If you drink alcohol, limit how much you drink. Limit alcohol to 2 drinks a day for men and 1 drink a day for women. When should you call for help? Call your doctor now or seek immediate medical care if:    You have severe belly pain. Your stools are maroon or very bloody. Watch closely for changes in your health, and be sure to contact your doctor if:    You have a fever. You have nausea or vomiting. You have a change in bowel habits (new constipation or diarrhea). Your symptoms get worse or are not improving as expected. Where can you learn more? Go to http://www.[employer].com/  Enter C571 in the search box to learn more about \"Colon Polyps: Care Instructions. \"  Current as of: September 8, 2021               Content Version: 13.2  © 1334-9378 Healthwise, [employer].    Care instructions adapted under license by [employer] (which disclaims liability or warranty for this information). If you have questions about a medical condition or this instruction, always ask your healthcare professional. [address] any warranty or liability for your use of this information. Learning About Coronavirus [phone])  Coronavirus [phone]): Overview  What is coronavirus (COVID-19)? The coronavirus disease (COVID-19) is caused by a virus. It is an illness that was first found in [place], [place], in December 2019. It has since spread worldwide. The virus can cause fever, cough, and trouble breathing. In severe cases, it can cause pneumonia and make it hard to breathe without help. It can cause death. Coronaviruses are a large group of viruses. They cause the common cold. They also cause more serious illnesses like Middle East respiratory syndrome (MERS) and severe acute respiratory syndrome (SARS). COVID-19 is caused by a novel coronavirus. That means it's a new type that has not been seen in people before. This virus spreads person-to-person through droplets from coughing and sneezing. It can also spread when you are close to someone who is infected. And it can spread when you touch something that has the virus on it, such as a doorknob or a tabletop. What can you do to protect yourself from coronavirus (COVID-19)? The best way to protect yourself from getting sick is to: Avoid areas where there is an outbreak. Avoid contact with people who may be infected. Wash your hands often with soap or alcohol-based hand sanitizers. Avoid crowds and try to stay at least 6 feet away from other people. Wash your hands often, especially after you cough or sneeze. Use soap and water, and scrub for at least 20 seconds. If soap and water aren't available, use an alcohol-based hand . Avoid touching your mouth, nose, and eyes. What can you do to avoid spreading the virus to others?   To help avoid spreading the virus to others:  Cover your mouth with a tissue when you cough or sneeze. Then throw the tissue in the trash. Use a disinfectant to clean things that you touch often. Stay home if you are sick or have been exposed to the virus. Don't go to school, work, or public areas. And don't use public transportation. If you are sick:  Leave your home only if you need to get medical care. But call the doctor's office first so they know you're coming. And wear a face mask, if you have one. If you have a face mask, wear it whenever you're around other people. It can help stop the spread of the virus when you cough or sneeze. Clean and disinfect your home every day. Use household  and disinfectant wipes or sprays. Take special care to clean things that you grab with your hands. These include doorknobs, remote controls, phones, and handles on your refrigerator and microwave. And don't forget countertops, tabletops, bathrooms, and computer keyboards. When to call for help  Call 911 anytime you think you may need emergency care. For example, call if:  You have severe trouble breathing. (You can't talk at all.)  You have constant chest pain or pressure. You are severely dizzy or lightheaded. You are confused or can't think clearly. Your face and lips have a blue color. You pass out (lose consciousness) or are very hard to wake up. Call your doctor now if you develop symptoms such as:  Shortness of breath. Fever. Cough. If you need to get care, call ahead to the doctor's office for instructions before you go. Make sure you wear a face mask, if you have one, to prevent exposing other people to the virus. Where can you get the latest information? The following health organizations are tracking and studying this virus. Their websites contain the most up-to-date information. [place] also learn what to do if you think you may have been exposed to the virus. U.S. Centers for Disease Control and Prevention (CDC):  The CDC provides updated news about the disease and travel advice. The website also tells you how to prevent the spread of infection. www.cdc.gov  World Health Organization [facility]): WHO offers information about the virus outbreaks. WHO also has travel advice. www.who.int  Current as of: April 1, 2020               Content Version: 12.4  © 8406-3723 Healthwise, Incorporated. Care instructions adapted under license by your healthcare professional. If you have questions about a medical condition or this instruction, always ask your healthcare professional. [address] any warranty or liability for your use of this information.

## 2022-10-25 ENCOUNTER — HOSPITAL ENCOUNTER (INPATIENT)
Age: 62
LOS: 15 days | Discharge: HOME | DRG: 469 | End: 2022-11-09
Attending: STUDENT IN AN ORGANIZED HEALTH CARE EDUCATION/TRAINING PROGRAM | Admitting: FAMILY MEDICINE
Payer: MEDICAID

## 2022-10-25 ENCOUNTER — APPOINTMENT (OUTPATIENT)
Dept: CT IMAGING | Age: 62
DRG: 469 | End: 2022-10-25
Attending: FAMILY MEDICINE
Payer: MEDICAID

## 2022-10-25 DIAGNOSIS — N17.9 ACUTE KIDNEY INJURY (HCC): Primary | ICD-10-CM

## 2022-10-25 DIAGNOSIS — R19.7 DIARRHEA OF PRESUMED INFECTIOUS ORIGIN: ICD-10-CM

## 2022-10-25 LAB
ALBUMIN SERPL-MCNC: 3.8 G/DL (ref 3.5–5)
ALBUMIN/GLOB SERPL: 1 {RATIO} (ref 1.1–2.2)
ALP SERPL-CCNC: 178 U/L (ref 45–117)
ALT SERPL-CCNC: 23 U/L (ref 12–78)
ANION GAP SERPL CALC-SCNC: 6 MMOL/L (ref 5–15)
AST SERPL-CCNC: 18 U/L (ref 15–37)
BASOPHILS # BLD: 0 K/UL (ref 0–0.1)
BASOPHILS NFR BLD: 0 % (ref 0–1)
BILIRUB SERPL-MCNC: 0.3 MG/DL (ref 0.2–1)
BUN SERPL-MCNC: 42 MG/DL (ref 6–20)
BUN/CREAT SERPL: 8 (ref 12–20)
CALCIUM SERPL-MCNC: 8.5 MG/DL (ref 8.5–10.1)
CHLORIDE SERPL-SCNC: 109 MMOL/L (ref 97–108)
CO2 SERPL-SCNC: 23 MMOL/L (ref 21–32)
COMMENT, HOLDF: NORMAL
CREAT SERPL-MCNC: 5.33 MG/DL (ref 0.55–1.02)
DIFFERENTIAL METHOD BLD: ABNORMAL
EOSINOPHIL # BLD: 0 K/UL (ref 0–0.4)
EOSINOPHIL NFR BLD: 0 % (ref 0–7)
ERYTHROCYTE [DISTWIDTH] IN BLOOD BY AUTOMATED COUNT: 14.5 % (ref 11.5–14.5)
GLOBULIN SER CALC-MCNC: 3.9 G/DL (ref 2–4)
GLUCOSE SERPL-MCNC: 218 MG/DL (ref 65–100)
HCT VFR BLD AUTO: 32.8 % (ref 35–47)
HGB BLD-MCNC: 10 G/DL (ref 11.5–16)
IMM GRANULOCYTES # BLD AUTO: 0 K/UL (ref 0–0.04)
IMM GRANULOCYTES NFR BLD AUTO: 0 % (ref 0–0.5)
LYMPHOCYTES # BLD: 0.5 K/UL (ref 0.8–3.5)
LYMPHOCYTES NFR BLD: 11 % (ref 12–49)
MCH RBC QN AUTO: 26.5 PG (ref 26–34)
MCHC RBC AUTO-ENTMCNC: 30.5 G/DL (ref 30–36.5)
MCV RBC AUTO: 86.8 FL (ref 80–99)
MONOCYTES # BLD: 0.6 K/UL (ref 0–1)
MONOCYTES NFR BLD: 12 % (ref 5–13)
NEUTS SEG # BLD: 3.6 K/UL (ref 1.8–8)
NEUTS SEG NFR BLD: 77 % (ref 32–75)
NRBC # BLD: 0 K/UL (ref 0–0.01)
NRBC BLD-RTO: 0 PER 100 WBC
PLATELET # BLD AUTO: 142 K/UL (ref 150–400)
PMV BLD AUTO: 12 FL (ref 8.9–12.9)
POTASSIUM SERPL-SCNC: 3.8 MMOL/L (ref 3.5–5.1)
PROT SERPL-MCNC: 7.7 G/DL (ref 6.4–8.2)
RBC # BLD AUTO: 3.78 M/UL (ref 3.8–5.2)
RBC MORPH BLD: ABNORMAL
SAMPLES BEING HELD,HOLD: NORMAL
SODIUM SERPL-SCNC: 138 MMOL/L (ref 136–145)
WBC # BLD AUTO: 4.7 K/UL (ref 3.6–11)

## 2022-10-25 PROCEDURE — 85025 COMPLETE CBC W/AUTO DIFF WBC: CPT

## 2022-10-25 PROCEDURE — 93005 ELECTROCARDIOGRAM TRACING: CPT

## 2022-10-25 PROCEDURE — 74011250636 HC RX REV CODE- 250/636: Performed by: STUDENT IN AN ORGANIZED HEALTH CARE EDUCATION/TRAINING PROGRAM

## 2022-10-25 PROCEDURE — 99285 EMERGENCY DEPT VISIT HI MDM: CPT

## 2022-10-25 PROCEDURE — 65270000046 HC RM TELEMETRY

## 2022-10-25 PROCEDURE — 36415 COLL VENOUS BLD VENIPUNCTURE: CPT

## 2022-10-25 PROCEDURE — 74176 CT ABD & PELVIS W/O CONTRAST: CPT

## 2022-10-25 PROCEDURE — 80053 COMPREHEN METABOLIC PANEL: CPT

## 2022-10-25 RX ORDER — SODIUM CHLORIDE 9 MG/ML
50 INJECTION, SOLUTION INTRAVENOUS CONTINUOUS
Status: DISCONTINUED | OUTPATIENT
Start: 2022-10-25 | End: 2022-10-28

## 2022-10-25 RX ORDER — INSULIN GLARGINE 100 [IU]/ML
26 INJECTION, SOLUTION SUBCUTANEOUS
Status: DISCONTINUED | OUTPATIENT
Start: 2022-10-25 | End: 2022-10-28

## 2022-10-25 RX ORDER — CARVEDILOL 12.5 MG/1
25 TABLET ORAL 2 TIMES DAILY WITH MEALS
Status: DISCONTINUED | OUTPATIENT
Start: 2022-10-26 | End: 2022-11-09 | Stop reason: HOSPADM

## 2022-10-25 RX ORDER — ATORVASTATIN CALCIUM 40 MG/1
40 TABLET, FILM COATED ORAL DAILY
Status: DISCONTINUED | OUTPATIENT
Start: 2022-10-26 | End: 2022-11-09 | Stop reason: HOSPADM

## 2022-10-25 RX ORDER — NIFEDIPINE 30 MG/1
30 TABLET, FILM COATED, EXTENDED RELEASE ORAL 2 TIMES DAILY
Status: DISCONTINUED | OUTPATIENT
Start: 2022-10-26 | End: 2022-10-26 | Stop reason: SDUPTHER

## 2022-10-25 RX ORDER — SODIUM BICARBONATE 650 MG/1
650 TABLET ORAL 2 TIMES DAILY
Status: DISCONTINUED | OUTPATIENT
Start: 2022-10-26 | End: 2022-10-27

## 2022-10-25 RX ADMIN — SODIUM CHLORIDE 150 ML/HR: 9 INJECTION, SOLUTION INTRAVENOUS at 22:19

## 2022-10-25 RX ADMIN — SODIUM CHLORIDE 1000 ML: 9 INJECTION, SOLUTION INTRAVENOUS at 21:36

## 2022-10-25 NOTE — ED TRIAGE NOTES
She has been having diarrhea and nausea since Saturday. Due to her dx of kidney disease her doctor thought she should come to the ED.

## 2022-10-26 LAB
ALBUMIN SERPL-MCNC: 3 G/DL (ref 3.5–5)
ALBUMIN/GLOB SERPL: 1.2 {RATIO} (ref 1.1–2.2)
ALP SERPL-CCNC: 139 U/L (ref 45–117)
ALT SERPL-CCNC: 18 U/L (ref 12–78)
ANION GAP SERPL CALC-SCNC: 5 MMOL/L (ref 5–15)
APPEARANCE UR: CLEAR
APTT PPP: 30.9 SEC (ref 22.1–31)
AST SERPL-CCNC: 14 U/L (ref 15–37)
ATRIAL RATE: 63 BPM
BACTERIA URNS QL MICRO: ABNORMAL /HPF
BASOPHILS # BLD: 0 K/UL (ref 0–0.1)
BASOPHILS NFR BLD: 0 % (ref 0–1)
BILIRUB SERPL-MCNC: 0.3 MG/DL (ref 0.2–1)
BILIRUB UR QL: NEGATIVE
BUN SERPL-MCNC: 36 MG/DL (ref 6–20)
BUN/CREAT SERPL: 7 (ref 12–20)
C DIFF TOX GENS STL QL NAA+PROBE: POSITIVE
CALCIUM SERPL-MCNC: 7.4 MG/DL (ref 8.5–10.1)
CALCULATED P AXIS, ECG09: 44 DEGREES
CALCULATED R AXIS, ECG10: 72 DEGREES
CALCULATED T AXIS, ECG11: -74 DEGREES
CAMPYLOBACTER SPECIES, DNA: NEGATIVE
CHLORIDE SERPL-SCNC: 115 MMOL/L (ref 97–108)
CHLORIDE UR-SCNC: 91 MMOL/L
CO2 SERPL-SCNC: 22 MMOL/L (ref 21–32)
COLOR UR: ABNORMAL
CREAT SERPL-MCNC: 4.81 MG/DL (ref 0.55–1.02)
CREAT UR-MCNC: 79.9 MG/DL
DIAGNOSIS, 93000: NORMAL
DIFFERENTIAL METHOD BLD: ABNORMAL
ENTEROTOXIGEN E COLI, DNA: NEGATIVE
EOSINOPHIL # BLD: 0 K/UL (ref 0–0.4)
EOSINOPHIL NFR BLD: 0 % (ref 0–7)
EPITH CASTS URNS QL MICRO: ABNORMAL /LPF
ERYTHROCYTE [DISTWIDTH] IN BLOOD BY AUTOMATED COUNT: 14.5 % (ref 11.5–14.5)
GLOBULIN SER CALC-MCNC: 2.6 G/DL (ref 2–4)
GLUCOSE BLD STRIP.AUTO-MCNC: 138 MG/DL (ref 65–117)
GLUCOSE BLD STRIP.AUTO-MCNC: 146 MG/DL (ref 65–117)
GLUCOSE SERPL-MCNC: 140 MG/DL (ref 65–100)
GLUCOSE UR STRIP.AUTO-MCNC: NEGATIVE MG/DL
HCT VFR BLD AUTO: 26.2 % (ref 35–47)
HGB BLD-MCNC: 8.2 G/DL (ref 11.5–16)
HGB UR QL STRIP: ABNORMAL
HYALINE CASTS URNS QL MICRO: ABNORMAL /LPF (ref 0–5)
IMM GRANULOCYTES # BLD AUTO: 0 K/UL (ref 0–0.04)
IMM GRANULOCYTES NFR BLD AUTO: 0 % (ref 0–0.5)
INR PPP: 1.1 (ref 0.9–1.1)
INTERPRETATION: ABNORMAL
KETONES UR QL STRIP.AUTO: NEGATIVE MG/DL
LEUKOCYTE ESTERASE UR QL STRIP.AUTO: NEGATIVE
LYMPHOCYTES # BLD: 0.5 K/UL (ref 0.8–3.5)
LYMPHOCYTES NFR BLD: 20 % (ref 12–49)
MAGNESIUM SERPL-MCNC: 1.4 MG/DL (ref 1.6–2.4)
MCH RBC QN AUTO: 27.5 PG (ref 26–34)
MCHC RBC AUTO-ENTMCNC: 31.3 G/DL (ref 30–36.5)
MCV RBC AUTO: 87.9 FL (ref 80–99)
MONOCYTES # BLD: 0.5 K/UL (ref 0–1)
MONOCYTES NFR BLD: 18 % (ref 5–13)
NEUTS SEG # BLD: 1.7 K/UL (ref 1.8–8)
NEUTS SEG NFR BLD: 62 % (ref 32–75)
NITRITE UR QL STRIP.AUTO: NEGATIVE
NRBC # BLD: 0 K/UL (ref 0–0.01)
NRBC BLD-RTO: 0 PER 100 WBC
P SHIGELLOIDES DNA STL QL NAA+PROBE: NEGATIVE
P-R INTERVAL, ECG05: 124 MS
PCR REFLEX: ABNORMAL
PH UR STRIP: 6 [PH] (ref 5–8)
PHOSPHATE SERPL-MCNC: 2.6 MG/DL (ref 2.6–4.7)
PLATELET # BLD AUTO: 110 K/UL (ref 150–400)
PLATELET COMMENTS,PCOM: ABNORMAL
PMV BLD AUTO: 12.5 FL (ref 8.9–12.9)
POTASSIUM SERPL-SCNC: 3.7 MMOL/L (ref 3.5–5.1)
PROT SERPL-MCNC: 5.6 G/DL (ref 6.4–8.2)
PROT UR STRIP-MCNC: 300 MG/DL
PROT UR-MCNC: 166 MG/DL (ref 0–11.9)
PROTHROMBIN TIME: 11.6 SEC (ref 9–11.1)
Q-T INTERVAL, ECG07: 424 MS
QRS DURATION, ECG06: 90 MS
QTC CALCULATION (BEZET), ECG08: 433 MS
RBC # BLD AUTO: 2.98 M/UL (ref 3.8–5.2)
RBC #/AREA URNS HPF: ABNORMAL /HPF (ref 0–5)
RBC MORPH BLD: ABNORMAL
SALMONELLA SPECIES, DNA: NEGATIVE
SERVICE CMNT-IMP: ABNORMAL
SERVICE CMNT-IMP: ABNORMAL
SHIGA TOXIN PRODUCING, DNA: NEGATIVE
SHIGELLA SP+EIEC IPAH STL QL NAA+PROBE: NEGATIVE
SODIUM SERPL-SCNC: 142 MMOL/L (ref 136–145)
SODIUM UR-SCNC: 90 MMOL/L
SP GR UR REFRACTOMETRY: 1.01 (ref 1–1.03)
THERAPEUTIC RANGE,PTTT: NORMAL SECS (ref 58–77)
UA: UC IF INDICATED,UAUC: ABNORMAL
UROBILINOGEN UR QL STRIP.AUTO: 0.2 EU/DL (ref 0.2–1)
VENTRICULAR RATE, ECG03: 63 BPM
VIBRIO SPECIES, DNA: NEGATIVE
WBC # BLD AUTO: 2.7 K/UL (ref 3.6–11)
WBC URNS QL MICRO: ABNORMAL /HPF (ref 0–4)
Y. ENTEROCOLITICA, DNA: NEGATIVE

## 2022-10-26 PROCEDURE — 74011250636 HC RX REV CODE- 250/636: Performed by: INTERNAL MEDICINE

## 2022-10-26 PROCEDURE — 84300 ASSAY OF URINE SODIUM: CPT

## 2022-10-26 PROCEDURE — 87324 CLOSTRIDIUM AG IA: CPT

## 2022-10-26 PROCEDURE — 81001 URINALYSIS AUTO W/SCOPE: CPT

## 2022-10-26 PROCEDURE — 74011250637 HC RX REV CODE- 250/637: Performed by: FAMILY MEDICINE

## 2022-10-26 PROCEDURE — 82436 ASSAY OF URINE CHLORIDE: CPT

## 2022-10-26 PROCEDURE — 85025 COMPLETE CBC W/AUTO DIFF WBC: CPT

## 2022-10-26 PROCEDURE — 85610 PROTHROMBIN TIME: CPT

## 2022-10-26 PROCEDURE — 84100 ASSAY OF PHOSPHORUS: CPT

## 2022-10-26 PROCEDURE — 82962 GLUCOSE BLOOD TEST: CPT

## 2022-10-26 PROCEDURE — 80053 COMPREHEN METABOLIC PANEL: CPT

## 2022-10-26 PROCEDURE — 87506 IADNA-DNA/RNA PROBE TQ 6-11: CPT

## 2022-10-26 PROCEDURE — 74011250636 HC RX REV CODE- 250/636: Performed by: STUDENT IN AN ORGANIZED HEALTH CARE EDUCATION/TRAINING PROGRAM

## 2022-10-26 PROCEDURE — 74011636637 HC RX REV CODE- 636/637: Performed by: FAMILY MEDICINE

## 2022-10-26 PROCEDURE — 84156 ASSAY OF PROTEIN URINE: CPT

## 2022-10-26 PROCEDURE — 65660000001 HC RM ICU INTERMED STEPDOWN

## 2022-10-26 PROCEDURE — 82570 ASSAY OF URINE CREATININE: CPT

## 2022-10-26 PROCEDURE — 85730 THROMBOPLASTIN TIME PARTIAL: CPT

## 2022-10-26 PROCEDURE — 87493 C DIFF AMPLIFIED PROBE: CPT

## 2022-10-26 PROCEDURE — 36415 COLL VENOUS BLD VENIPUNCTURE: CPT

## 2022-10-26 PROCEDURE — 74011250636 HC RX REV CODE- 250/636: Performed by: FAMILY MEDICINE

## 2022-10-26 PROCEDURE — 74011000250 HC RX REV CODE- 250: Performed by: INTERNAL MEDICINE

## 2022-10-26 PROCEDURE — 83735 ASSAY OF MAGNESIUM: CPT

## 2022-10-26 RX ORDER — MAGNESIUM SULFATE 100 %
4 CRYSTALS MISCELLANEOUS AS NEEDED
Status: DISCONTINUED | OUTPATIENT
Start: 2022-10-26 | End: 2022-11-09 | Stop reason: HOSPADM

## 2022-10-26 RX ORDER — INSULIN LISPRO 100 [IU]/ML
INJECTION, SOLUTION INTRAVENOUS; SUBCUTANEOUS
Status: DISCONTINUED | OUTPATIENT
Start: 2022-10-26 | End: 2022-10-31

## 2022-10-26 RX ORDER — ONDANSETRON 2 MG/ML
4 INJECTION INTRAMUSCULAR; INTRAVENOUS
Status: DISCONTINUED | OUTPATIENT
Start: 2022-10-26 | End: 2022-11-09 | Stop reason: HOSPADM

## 2022-10-26 RX ORDER — VANCOMYCIN HYDROCHLORIDE 250 MG/5ML
125 POWDER, FOR SOLUTION ORAL EVERY 6 HOURS
Status: DISPENSED | OUTPATIENT
Start: 2022-10-27 | End: 2022-11-05

## 2022-10-26 RX ORDER — NIFEDIPINE 30 MG/1
30 TABLET, EXTENDED RELEASE ORAL 2 TIMES DAILY
Status: DISCONTINUED | OUTPATIENT
Start: 2022-10-26 | End: 2022-10-28

## 2022-10-26 RX ORDER — ACETAMINOPHEN 325 MG/1
650 TABLET ORAL
Status: DISCONTINUED | OUTPATIENT
Start: 2022-10-26 | End: 2022-11-01

## 2022-10-26 RX ORDER — HYDRALAZINE HYDROCHLORIDE 10 MG/1
10 TABLET, FILM COATED ORAL ONCE
Status: COMPLETED | OUTPATIENT
Start: 2022-10-26 | End: 2022-10-26

## 2022-10-26 RX ORDER — HYDRALAZINE HYDROCHLORIDE 20 MG/ML
5 INJECTION INTRAMUSCULAR; INTRAVENOUS ONCE
Status: COMPLETED | OUTPATIENT
Start: 2022-10-26 | End: 2022-10-26

## 2022-10-26 RX ORDER — HYDRALAZINE HYDROCHLORIDE 20 MG/ML
10 INJECTION INTRAMUSCULAR; INTRAVENOUS
Status: DISCONTINUED | OUTPATIENT
Start: 2022-10-26 | End: 2022-11-09 | Stop reason: HOSPADM

## 2022-10-26 RX ADMIN — SODIUM BICARBONATE 650 MG: 650 TABLET ORAL at 09:30

## 2022-10-26 RX ADMIN — SODIUM CHLORIDE 150 ML/HR: 9 INJECTION, SOLUTION INTRAVENOUS at 05:29

## 2022-10-26 RX ADMIN — NIFEDIPINE 30 MG: 30 TABLET, FILM COATED, EXTENDED RELEASE ORAL at 17:18

## 2022-10-26 RX ADMIN — HYDRALAZINE HYDROCHLORIDE 5 MG: 20 INJECTION INTRAMUSCULAR; INTRAVENOUS at 00:17

## 2022-10-26 RX ADMIN — HYDRALAZINE HYDROCHLORIDE 10 MG: 10 TABLET, FILM COATED ORAL at 03:26

## 2022-10-26 RX ADMIN — SODIUM CHLORIDE 5 MG/HR: 9 INJECTION, SOLUTION INTRAVENOUS at 19:27

## 2022-10-26 RX ADMIN — CARVEDILOL 25 MG: 12.5 TABLET, FILM COATED ORAL at 17:18

## 2022-10-26 RX ADMIN — ATORVASTATIN CALCIUM 40 MG: 40 TABLET, FILM COATED ORAL at 09:30

## 2022-10-26 RX ADMIN — NIFEDIPINE 30 MG: 30 TABLET, FILM COATED, EXTENDED RELEASE ORAL at 10:24

## 2022-10-26 RX ADMIN — SODIUM CHLORIDE 100 ML/HR: 9 INJECTION, SOLUTION INTRAVENOUS at 22:00

## 2022-10-26 RX ADMIN — SODIUM BICARBONATE 650 MG: 650 TABLET ORAL at 17:18

## 2022-10-26 RX ADMIN — CARVEDILOL 25 MG: 12.5 TABLET, FILM COATED ORAL at 07:02

## 2022-10-26 RX ADMIN — INSULIN GLARGINE 26 UNITS: 100 INJECTION, SOLUTION SUBCUTANEOUS at 21:42

## 2022-10-26 RX ADMIN — HYDRALAZINE HYDROCHLORIDE 10 MG: 20 INJECTION INTRAMUSCULAR; INTRAVENOUS at 13:08

## 2022-10-26 RX ADMIN — SODIUM CHLORIDE 7.5 MG/HR: 9 INJECTION, SOLUTION INTRAVENOUS at 20:23

## 2022-10-26 NOTE — PROGRESS NOTES
RUR 11 %     Transition of Care- Home when medically stable. Family to transport per the patient. Follow up with PCP. Decision Maker/DTR- Andres Patricia [phone]. Reason for Admission:  Diarrhea                     RUR Score:      11 %               Plan for utilizing home health:  not indicated at this time- patient has used [facility] in the past 2021 for CVA- PT and OT- does not recall the agencies name. PCP: First and Last name:  Cindi Solorio,      Name of Practice:    Are you a current patient: Yes/No:    Approximate date of last visit: last March or April 2022   Can you participate in a virtual visit with your PCP: no                    Current Advanced Directive/Advance Care Plan: DNR      Healthcare Decision Maker:   Click here to complete [place] including selection of the Healthcare Decision Maker Relationship (ie \"Primary\")             Primary Decision MakerMfarooq Armendariz - [phone]                  Transition of Care Plan:    Nephrology consult- C-diff rule out. Home when medically stable. CM met with the patient. The patient lives alone using no DME. The patient is independent with ADLS and IADLS. The patient owns a RW and Alis Patel from previous stroke. History of HH for PT and OT post stroke. The patient plans to return home at discharge. CM confirmed demographics, Insurance, PCP, and Pharmacy- [facility] Management Interventions  PCP Verified by CM: Yes  Palliative Care Criteria Met (RRAT>21 & CHF Dx)?: No  Mode of Transport at Discharge: Self (family)  Transition of Care Consult (CM Consult):  Other (return home when stable)  MyChart Signup: No  Discharge Durable Medical Equipment: No  Health Maintenance Reviewed: Yes  Physical Therapy Consult: No  Occupational Therapy Consult: No  Speech Therapy Consult: No  Support Systems: Child(jhonny)  Confirm Follow Up Transport: Family  The Plan for Transition of Care is Related to the Following Treatment Goals : Nephro consult- C-diff pending- home when medically stable  The Patient and/or Patient Representative was Provided with a Choice of Provider and Agrees with the Discharge Plan?: Yes  Name of the Patient Representative Who was Provided with a Choice of Provider and Agrees with the Discharge Plan: the patient  Freedom of Choice List was Provided with Basic Dialogue that Supports the Patient's Individualized Plan of Care/Goals, Treatment Preferences and Shares the Quality Data Associated with the Providers?: Yes   Resource Information Provided?: No  Discharge Location  Patient Expects to be Discharged to[de-identified] [address]

## 2022-10-26 NOTE — PROGRESS NOTES
[address] [facility] Adult  Hospitalist Group                                                                                          Hospitalist Progress Note  Imelda Rene MD  Answering service: [phone] from in house phone        Date of Service:  10/26/2022  NAME:  Marce Kim  :  1960  MRN:  [id number]      Admission Summary:   The patient is 65years old woman with past medical history significant for chronic kidney disease, diabetes mellitus type 2, GERD, hypertension, hyperlipidemia, right vision loss presented emergency department with a chief complaint of diarrhea and poor appetite. Patient was found to have RAYMOND superimposed on CKD. Interval history / Subjective:   Patient was seen and examined. No acute events overnight. Discussed with RN overnight events. All patient's questions were answered.     \"I have nausea \"    Review of Systems:  Symptom Y/N Comments  Symptom Y/N Comments   Fever/Chills n   Chest Pain n    Poor Appetite    Edema     Cough n   Abdominal Pain n    Sputum    Joint Pain     SOB/WALLACE n   Pruritis/Rash     Nausea/vomit y   Tolerating PT/OT     Diarrhea    Tolerating Diet y    Constipation    Other       Could NOT obtain due to:             Assessment & Plan:        RAYMOND superimposed on CKD stage III  - Baseline creatinine around 3.5, presented with creatinine 5.33 and BUN 42  - Likely prerenal from dehydration induced by diarrhea  - Started on IV fluid maintenance  - CT abdomen was ordered on admission with no hydronephrosis or obstruction  - BMP pending this morning    Asymptomatic bacteriuria, POA  - UA on admission with bacteriuria +1 but no pyuria or leukocyte esterase  - Patient does not have symptoms of UTI i.e. dysuria/urinary frequency/urgency  - No indication to treat her asymptomatic bacteriuria    Hypertensive emergency, POA  - Presented with systolic blood pressure more than 180 mmHg  - No evidence of endorgan damage, RAYMOND likely secondary to dehydration  - Blood pressure is more controlled right now  - Continue home medication with Coreg 25 mg twice daily, nifedipine 30 mg twice daily  - If no response removed surgically. Acute diarrhea, POA  - CT abdomen with no evidence of colitis  - Stool studies to be ordered including C. difficile, enteric panel  -Continue IV fluid support    Chronic normocytic anemia  - Hemoglobin stable around baseline    Diabetes mellitus type 2, uncontrolled  - Continue home insulin regimen  - Correction scale with hypoglycemia protocol/high-sensitivity due to her CKD    History of CVA  - With no residual  Hyperlipidemia  - Continue home statin    Obesity  - BMI 34 kg/m²  - Counseling was provided about weight loss     Code status: DNR  Prophylaxis: Heparin  Care Plan discussed with: Patient  Anticipated Disposition: TBD     Hospital Problems  Date Reviewed: 8/23/2022            Codes Class Noted POA    Diarrhea ICD-10-CM: R19.7  ICD-9-CM: 787.91  10/25/2022 Unknown        RAYMOND (acute kidney injury) [facility]) ICD-10-CM: N17.9  ICD-9-CM: 584.9  10/25/2022 Unknown             Review of Systems:   A comprehensive review of systems was negative except for that written in the HPI. Vital Signs:    Last 24hrs VS reviewed since prior progress note. Most recent are:  Visit Vitals  BP (!) 177/72 (BP 1 Location: Right upper arm, BP Patient Position: At rest;Lying)   Pulse 64   Temp 99 °F (37.2 °C)   Resp 18   Wt 90 kg (198 lb 6.6 oz)   SpO2 97%   BMI 34.06 kg/m²         Intake/Output Summary (Last 24 hours) at 10/26/2022 0825  Last data filed at 10/25/2022 2236  Gross per 24 hour   Intake 1000 ml   Output --   Net 1000 ml        Physical Examination:     I had a face to face encounter with this patient and independently examined them on 10/26/2022 as outlined below:          Constitutional:  No acute distress, cooperative, pleasant    ENT:  Oral mucosa moist, oropharynx benign. Resp:  CTA bilaterally.  No wheezing/rhonchi/rales. No accessory muscle use. CV:  Regular rhythm, normal rate, no murmurs, gallops, rubs    GI:  Soft, non distended, non tender. normoactive bowel sounds, no hepatosplenomegaly     Musculoskeletal:  No edema, warm, 2+ pulses throughout    Neurologic:  Moves all extremities. AAOx3, CN II-XII reviewed            Data Review:    Review and/or order of clinical lab test      Labs:     Recent Labs     10/25/22  1831   WBC 4.7   HGB 10.0*   HCT 32.8*   *     Recent Labs     10/25/22  1831      K 3.8   *   CO2 23   BUN 42*   CREA 5.33*   *   CA 8.5     Recent Labs     10/25/22  1831   ALT 23   *   TBILI 0.3   TP 7.7   ALB 3.8   GLOB 3.9     No results for input(s): INR, PTP, APTT, INREXT in the last 72 hours. No results for input(s): FE, TIBC, PSAT, FERR in the last 72 hours. No results found for: FOL, RBCF   No results for input(s): PH, PCO2, PO2 in the last 72 hours. No results for input(s): CPK, CKNDX, TROIQ in the last 72 hours.     No lab exists for component: CPKMB  No results found for: CHOL, CHOLX, CHLST, CHOLV, HDL, HDLP, LDL, LDLC, DLDLP, TGLX, TRIGL, TRIGP, CHHD, CHHDX  Lab Results   Component Value Date/Time    Glucose (POC) 138 (H) 10/26/2022 12:21 AM    Glucose (POC) 119 (H) 08/23/2022 10:51 AM    Glucose (POC) 279 (H) 06/08/2017 11:06 AM    Glucose (POC) 95 03/25/2016 08:57 AM    Glucose (POC) 336 (H) 04/15/2013 12:10 AM    Glucose POC 85 10/17/2017 08:34 AM    Glucose  07/17/2017 08:59 AM    Glucose  06/28/2017 11:15 AM    Glucose  06/13/2017 01:00 PM    Glucose  09/22/2016 08:13 AM     Lab Results   Component Value Date/Time    Color YELLOW/STRAW 10/26/2022 05:30 AM    Appearance CLEAR 10/26/2022 05:30 AM    Specific gravity 1.011 10/26/2022 05:30 AM    Specific gravity 1.025 04/19/2013 11:30 AM    pH (UA) 6.0 10/26/2022 05:30 AM    Protein 300 (A) 10/26/2022 05:30 AM    Glucose Negative 10/26/2022 05:30 AM    Ketone Negative 10/26/2022 05:30 AM    Bilirubin Negative 10/26/2022 05:30 AM    Urobilinogen 0.2 10/26/2022 05:30 AM    Nitrites Negative 10/26/2022 05:30 AM    Leukocyte Esterase Negative 10/26/2022 05:30 AM    Epithelial cells FEW 10/26/2022 05:30 AM    Bacteria 1+ (A) 10/26/2022 05:30 AM    WBC 0-4 10/26/2022 05:30 AM    RBC 0-5 10/26/2022 05:30 AM         Medications Reviewed:     Current Facility-Administered Medications   Medication Dose Route Frequency    0.9% sodium chloride infusion  150 mL/hr IntraVENous CONTINUOUS    atorvastatin (LIPITOR) tablet 40 mg  40 mg Oral DAILY    carvediloL (COREG) tablet 25 mg  25 mg Oral BID WITH MEALS    insulin glargine (LANTUS) injection 26 Units  26 Units SubCUTAneous QHS    NIFEdipine ER (ADALAT CC) tablet 30 mg  30 mg Oral BID    sodium bicarbonate tablet 650 mg  650 mg Oral BID     ______________________________________________________________________  EXPECTED LENGTH OF STAY: - - -  ACTUAL LENGTH OF STAY:          1                 Nicanor Page MD

## 2022-10-26 NOTE — ED PROVIDER NOTES
Rolf Gunter is a 65 y.o. female with past medical history notable for diabetes, hypercholesterolemia, intertrigo, renal disease chronic presenting with diarrhea for the past 5 days, poor appetite, states whenever she eats or drinks anything she immediately has watery diarrhea, she has not had hematochezia, hematemesis. Does endorse nausea. No dysuria, endorses decreased urine output.           Past Medical History:   Diagnosis Date    Diabetes ([facility] [address]     Diabetes Mellitus 2011    Diabetes Mellitus 2011    GERG - Esophagitis- reflux 2011    Hypercholesteremia 2011    Hypercholesterolemia 2011    Hypertension 2011    Intertrigo 2011    Kidney disease     Leiomyoma of uterus, unspecified 2011    Unspecified visual loss 2011    rt eye       Past Surgical History:   Procedure Laterality Date    COLONOSCOPY N/A 2022    COLONOSCOPY performed by Zain Case MD at [facility] ENDOSCOPY         Family History:   Problem Relation Age of Onset    Diabetes Mother     Hypertension Mother     Diabetes Father        Social History     Socioeconomic History    Marital status:      Spouse name: Not on file    Number of children: Not on file    Years of education: Not on file    Highest education level: Not on file   Occupational History    Not on file   Tobacco Use    Smoking status: Former     Types: Cigarettes     Quit date:      Years since quittin.8    Smokeless tobacco: Never   Vaping Use    Vaping Use: Never used   Substance and Sexual Activity    Alcohol use: No    Drug use: No    Sexual activity: Never   Other Topics Concern    Not on file   Social History Narrative    Not on file     Social Determinants of Health     Financial Resource Strain: Not on file   Food Insecurity: Not on file   Transportation Needs: Not on file   Physical Activity: Not on file   Stress: Not on file   Social Connections: Not on file   Intimate Partner Violence: Not on file   Housing Stability: Not on file         ALLERGIES: Sulfur, Amoxicillin, and Other medication    Review of Systems   Constitutional:  Negative for chills, fatigue and fever. Eyes:  Negative for photophobia. Respiratory:  Negative for shortness of breath. Cardiovascular:  Negative for chest pain. Gastrointestinal:  Positive for diarrhea. Negative for abdominal pain, nausea and vomiting. Genitourinary:  Negative for dysuria. Musculoskeletal:  Negative for back pain. Neurological:  Negative for headaches. Psychiatric/Behavioral:  Negative for confusion. All other systems reviewed and are negative. Vitals:    10/25/22 2033 10/25/22 2103 10/25/22 2110 10/25/22 2118   BP: (!) 179/80 (!) 188/104  (!) 202/107   Pulse:       Resp:       Temp:  99.4 °F (37.4 °C)     SpO2:  100% 94% 93%            Physical Exam  Constitutional:       General: She is not in acute distress. Appearance: She is not toxic-appearing. HENT:      Head: Normocephalic and atraumatic. Mouth/Throat:      Mouth: Mucous membranes are moist.   Eyes:      Extraocular Movements: Extraocular movements intact. Cardiovascular:      Rate and Rhythm: Normal rate and regular rhythm. Heart sounds: Normal heart sounds. Pulmonary:      Effort: Pulmonary effort is normal. No respiratory distress. Breath sounds: Normal breath sounds. Abdominal:      Palpations: Abdomen is soft. Tenderness: There is no abdominal tenderness. Musculoskeletal:      Cervical back: Normal range of motion. Right lower leg: No edema. Left lower leg: No edema. Skin:     Capillary Refill: Capillary refill takes less than 2 seconds. Neurological:      General: No focal deficit present. Mental Status: She is alert and oriented to person, place, and time.    Psychiatric:         Mood and Affect: Mood normal.        MARLY       Perfect Serve Consult for Admission  9:29 PM    ED Room Number: EI25/03  Patient Name and age: Leanna Durán 65 y.o.  female  Working Diagnosis:   1. Acute kidney injury ([facility] [address]    2. Diarrhea of presumed infectious origin        COVID-19 Suspicion:  no  Sepsis present:  no  Reassessment needed: no  Code Status:  Full Code  Readmission: no  Isolation Requirements:  no  Recommended Level of Care:  med/surg  Department:[facility] Adult ED - 21   Other:     Patient presenting with diarrhea, frequent and persistently postprandial over the past 5 days, has chronic renal insufficiency which has beenExacerbated, her baseline creatinine has been rising but most recently was 3, creatinine now is 5, will need IV fluids and recheck.     Procedures

## 2022-10-26 NOTE — CONSULTS
NEPHROLOGY CONSULT NOTE     Patient: Champ Rain MRN: [id number]  PCP: Alannah Cohn DO   :     1960  Age:   65 y.o. Sex:  female      Referring physician: Genia Peck MD      Reason for consultation:     Acute kidney injury. Chronic kidney disease. Ms. Alissa Luna was seen and examined in room 214 this morning. Her nurse was present during my visit. Admission Date: 10/25/2022  8:04 PM  LOS: 1 day     DISCUSSION / PLAN :   Acute kidney injury is likely explained on the basis of volume depletion. There was a clear history of a lower gastrointestinal losses. This likely contributed to a decrease in his circulating intravascular volume with hypoperfusion and RAYMOND. ATN is a possibility as well. I doubt the patient has acute interstitial nephritis or acute glomerulonephritis. Apart from proteinuria the urinalysis was relatively bland. Obstruction was ruled out on the abdominal imaging as well. There is no indication at this time for acute dialysis. I would recommend continuing the current course of IV hydration. However ,I would decrease the rate to 100 cc/h. A renal panel has been requested for this morning as well. Urine lytes were also requested. Avoid nonsteroidal anti-inflammatory drugs and IV contrast agents. Hold ACE inhibitors and ARB's and renin antagonist.    Follow her electrolytes daily. Medications should be dosed for her GFR. Active Problems / Assessment AAActive  : Active Problems:    Diarrhea (10/25/2022)      RAYMOND (acute kidney injury) ([facility] [address] (10/25/2022)    Chronic kidney disease stage IV/stage V. Diabetes mellitus. Chronic kidney disease. Next metabolic acidosis secondary to CKD. Subjective:     \" I had diarrhea real bad\"        HPI:    Ms. Alissa Luna is a 58-year-old -American lady who is well-known to our service. She is followed by my partner Dr. Ambika Colnidres.   She was last seen in the office on October 28 this year. Her comorbidities include stage IV/stage V chronic kidney disease, diabetes mellitus, anemia chronic kidney disease, and hypertension. On June 22 this year B,UN was 46 with a creatinine of 4.93. On October 17 th this year , BUN was 34 with a creatinine of 4.88. Patient reported a history of diarrhea over the past 5 days. The stool was watery. There was no history of upper or lower gastrointestinal bleeding. There was no history of nausea or vomiting. Her appetite was a bit depressed however. There was no history of headache, sore throat, fever, chills, epistaxis or bleeding from the gums. There was no history of dysuria or hematuria. There iwas no history of myalgia or arthralgia. There was no suggestion of syncope. She was evaluated in the ER overnight. Her BUN was  42 with a creatinine 5.33. She was placed on IV normal saline and admitted for further evaluation and management. Past Medical Hx:   Past Medical History:   Diagnosis Date    Diabetes ([facility] [address]     Diabetes Mellitus 04/25/2011    Diabetes Mellitus 04/25/2011    GERG - Esophagitis- reflux 04/25/2011    Hypercholesteremia 04/25/2011    Hypercholesterolemia 04/25/2011    Hypertension 04/25/2011    Intertrigo 04/25/2011    Kidney disease     Leiomyoma of uterus, unspecified 04/25/2011    Unspecified visual loss 04/25/2011    rt eye        Past Surgical Hx:     Past Surgical History:   Procedure Laterality Date    COLONOSCOPY N/A 8/23/2022    COLONOSCOPY performed by Shena Pratt MD at [facility] ENDOSCOPY       Medications:  Prior to Admission medications    Medication Sig Start Date End Date Taking? Authorizing Provider   NIFEdipine ER (ADALAT CC) 30 mg ER tablet Take 30 mg by mouth two (2) times a day. Yes Provider, Historical   aspirin 81 mg chewable tablet Take 81 mg by mouth in the morning. Yes Provider, Historical   sodium bicarbonate 650 mg tablet Take 650 mg by mouth two (2) times a day.    Yes Provider, Historical   insulin glargine (LANTUS,BASAGLAR) 100 unit/mL (3 mL) inpn 26 Units by SubCUTAneous route nightly. Yes Provider, Historical   carvediloL (COREG) 12.5 mg tablet Take 25 mg by mouth two (2) times daily (with meals). HOLD FOR SYSTOLIC BLOOD PRESSURE < 120 mmHg OR HEART RATE < 60 bpm   Yes Other, MD Vivi   atorvastatin (LIPITOR) 40 mg tablet TAKE 1 TABLET BY MOUTH EVERY DAY  Patient taking differently: 80 mg. 4/15/19  Yes Soniya Vázquez MD   bumetanide (BUMEX) 1 mg tablet Take 1 mg by mouth in the morning. Provider, Historical   NOVOFINE 32 32 gauge x 1/4\" ndle USE AS DIRECTED 12/18/17   Soniya Vázquez MD   Blood-Glucose Meter monitoring kit CONTOUR METER; Use as directed 5/3/13   Soniya Vázquez MD   glucose blood VI test strips (ASCENSIA CONTOUR) strip Daily 4/30/13   Soniya Vázquez MD   glucose blood VI test strips (BLOOD GLUCOSE TEST) strip As directed   2/24/12   Soniya Vázquez MD       Allergies   Allergen Reactions    Sulfur Hives    Amoxicillin Nausea Only    Other Medication Unknown (comments)     Zylocaine         Social Hx:  reports that she quit smoking about 12 years ago. Her smoking use included cigarettes. She has never used smokeless tobacco. She reports that she does not drink alcohol and does not use drugs. Family History   Problem Relation Age of Onset    Diabetes Mother     Hypertension Mother     Diabetes Father      Neg for renal disease. Review of Systems:      See HPI.                  Objective:    Vitals:    Vitals:    10/26/22 0557 10/26/22 0701 10/26/22 0828 10/26/22 1009   BP:   (!) 196/77    Pulse: 64  65 60   Resp:   18    Temp:   100.3 °F (37.9 °C)    SpO2:   98%    Weight:  90 kg (198 lb 6.6 oz)       I&O's:  10/25 0701 - 10/26 0700  In: 1000 [I.V.:1000]  Out: -   Visit Vitals  BP (!) 196/77 (BP 1 Location: Left upper arm, BP Patient Position: At rest)   Pulse 60   Temp 100.3 °F (37.9 °C)   Resp 18   Wt 90 kg (198 lb 6.6 oz)   LMP 10/07/2012   SpO2 98%   BMI 34.06 kg/m²       Physical Exam:      Ms. Maggy Harris was seen and examined in room 214 this morning. Her nurse was present during my visit. General: A chronically ill looking lady lying in bed quite comfortably. Head: Normocephalic. Mucous membranes: Moist and anicteric. Cardiovascular: S1, S2, no S3 gallop, no pericardial rub. Risk examination: Breath sounds are vesicular, no wheezes, no rales. Abdomen: Not distended, soft, nontender. Lower extremities: No pretibial edema. Neuro: Lethargic but able to respond to all questions appropriately. Psych: Seemed a bit forlorn.           Laboratory Results:    Lab Results   Component Value Date    BUN 42 (H) 10/25/2022     10/25/2022    K 3.8 10/25/2022     (H) 10/25/2022    CO2 23 10/25/2022       Lab Results   Component Value Date    BUN 42 (H) 10/25/2022    BUN 27 (H) 03/23/2021    BUN 11 06/08/2017    BUN 11 05/02/2016    BUN 8 01/22/2016    K 3.8 10/25/2022    K 4.5 03/23/2021    K 3.2 (L) 06/08/2017    K 3.4 (L) 05/02/2016    K 3.6 01/22/2016       Lab Results   Component Value Date    WBC 4.7 10/25/2022    RBC 3.78 (L) 10/25/2022    HGB 10.0 (L) 10/25/2022    HCT 32.8 (L) 10/25/2022    MCV 86.8 10/25/2022    MCH 26.5 10/25/2022    RDW 14.5 10/25/2022     (L) 10/25/2022       No results found for: PTH, PHOS    Urine dipstick:   Lab Results   Component Value Date/Time    Color YELLOW/STRAW 10/26/2022 05:30 AM    Appearance CLEAR 10/26/2022 05:30 AM    Specific gravity 1.011 10/26/2022 05:30 AM    Specific gravity 1.025 04/19/2013 11:30 AM    pH (UA) 6.0 10/26/2022 05:30 AM    Protein 300 (A) 10/26/2022 05:30 AM    Glucose Negative 10/26/2022 05:30 AM    Ketone Negative 10/26/2022 05:30 AM    Bilirubin Negative 10/26/2022 05:30 AM    Urobilinogen 0.2 10/26/2022 05:30 AM    Nitrites Negative 10/26/2022 05:30 AM    Leukocyte Esterase Negative 10/26/2022 05:30 AM    Epithelial cells FEW 10/26/2022 05:30 AM    Bacteria 1+ (A) 10/26/2022 05:30 AM    WBC 0-4 10/26/2022 05:30 AM    RBC 0-5 10/26/2022 05:30 AM       I have reviewed the following:         15 Cyril Serrano discussed with: Patient    Chart reviewed. Thank you for allowing us to participate in the care of this patient. We will follow patient. Please dont hesitate to call with any questions    Mireille Hui MD  10/26/2022    Stef Del Rosario [address]  Phone - [phone]   Fax - [phone]  www. [facility]. [facility]

## 2022-10-26 NOTE — DISCHARGE INSTRUCTIONS
Discharge Instructions       PATIENT ID: Leanna Durán  MRN: [id number]   YOB: 1960    DATE OF ADMISSION: 10/25/2022  DATE OF DISCHARGE: 11/9/2022    PRIMARY CARE PROVIDER: Igor Juarez MD     ATTENDING PHYSICIAN: Celene Goldmann, MD   DISCHARGING PROVIDER: Sofi Blake MD    To contact this individual call [phone] and ask the  to page. If unavailable ask to be transferred the Adult Hospitalist Department. DISCHARGE DIAGNOSES   Acute hypoxic respiratory failure- resolved to room air   Multifocal PNA, + COVID-19 on 11/1/22, viral with possible superimposed bacterial Pseudomonas Pneumonia  RAYMOND superimposed on CKD stage IV/V, worsening  Metabolic Acidosis  Acute diarrhea secondary to C Difficile Infection, POA   Hypocalcemia  Hypomagnesemia:   Anxiety  Hypertensive emergency, POA  Asymptomatic bacteriuria, POA  Chronic normocytic anemia  T2DM uncontrolled  Hx of CVA  Hyperlipidemia  Obesity    CONSULTATIONS:   GI  Nephrology    PROCEDURES/SURGERIES: * No surgery found *    PENDING TEST RESULTS:   At the time of discharge the following test results are still pending: None    FOLLOW UP APPOINTMENTS:   Igor Juarez DO,  in one week   Boris Hoyt MD in one week      ADDITIONAL CARE RECOMMENDATIONS:      DIET: Diabetic Diet    ACTIVITY: Activity as tolerated    WOUND CARE: None    EQUIPMENT needed: None       DISCHARGE MEDICATIONS:   See Medication Reconciliation Form    It is important that you take the medication exactly as they are prescribed. Keep your medication in the bottles provided by the pharmacist and keep a list of the medication names, dosages, and times to be taken in your wallet. Do not take other medications without consulting your doctor. NOTIFY YOUR PHYSICIAN FOR ANY OF THE FOLLOWING:   Fever over 101 degrees for 24 hours.    Chest pain, shortness of breath, fever, chills, nausea, vomiting, diarrhea, change in mentation, falling, weakness, bleeding. Severe pain or pain not relieved by medications. Or, any other signs or symptoms that you may have questions about.       DISPOSITION:    Home With:   OT  PT  HH  RN       SNF/Inpatient Rehab/LTAC   x Independent/assisted living    Hospice    Other:     CDMP Checked:   Yes x     PROBLEM LIST Updated:  Yes x       Signed:   Lorna Pena MD  11/9/2022  12:28 PM

## 2022-10-26 NOTE — PROGRESS NOTES
Spoke with Provider r/t elevated b/p, new order noted hydralazine 10 mg po x 1 dose now. Pt denies HA, nausea, blurred vision. Pt  states slight dizziness standing to go to rest room.

## 2022-10-26 NOTE — PROGRESS NOTES
TRANSFER - IN REPORT:    Verbal report received from \[facility]\"" RN(name) on Nelson Vu  being received from (unit) for change in patient condition(HTN)      Report consisted of patients Situation, Background, Assessment and   Recommendations(SBAR). Information from the following report(s) SBAR, Kardex, Procedure Summary, Intake/Output, MAR, Recent Results, and Cardiac Rhythm NSR  was reviewed with the receiving nurse. Opportunity for questions and clarification was provided. Assessment completed upon patients arrival to unit and care assumed. 1930 Bedside and Verbal shift change report given to Lester Harper RN (oncoming nurse) by Monika Gandhi RN (offgoing nurse). Report included the following information SBAR, Kardex, Intake/Output, MAR, Recent Results, and Cardiac Rhythm NSR .

## 2022-10-26 NOTE — PROGRESS NOTES
TRANSFER - OUT REPORT:    Verbal report given to RN(name) on Renteria Rounds  being transferred to CCU(unit) for routine progression of care       Report consisted of patients Situation, Background, Assessment and   Recommendations(SBAR). Information from the following report(s) {SBAR REPORTS CBZD:92190} was reviewed with the receiving nurse. Lines:   Peripheral IV 10/25/22 Right Antecubital (Active)   Site Assessment Clean, dry, & intact 10/26/22 1000   Phlebitis Assessment 0 10/26/22 1000   Infiltration Assessment 0 10/26/22 1000   Dressing Status Clean, dry, & intact 10/26/22 1000   Dressing Type Transparent 10/26/22 1000   Hub Color/Line Status Pink 10/26/22 1000   Action Taken Open ports on tubing capped 10/26/22 1000   Alcohol Cap Used Yes 10/26/22 0523        Opportunity for questions and clarification was provided.       Patient transported with:   {TRANSPORTDETAILS:75353}

## 2022-10-26 NOTE — ROUTINE PROCESS
TRANSFER - OUT REPORT:    Verbal report given to Everardo Joshi (leatha) on [place]  being transferred to  (unit) for routine progression of care       Report consisted of patients Situation, Background, Assessment and   Recommendations(SBAR). Information from the following report(s) SBAR, Kardex, ED Summary, OR Summary, Intake/Output, MAR, Cardiac Rhythm NSR, and Quality Measures was reviewed with the receiving nurse. Lines:   Peripheral IV 10/25/22 Right Antecubital (Active)   Site Assessment Clean, dry, & intact 10/25/22 1834   Phlebitis Assessment 0 10/25/22 1834   Infiltration Assessment 0 10/25/22 1834   Dressing Status Clean, dry, & intact 10/25/22 1834   Dressing Type Transparent 10/25/22 1834   Hub Color/Line Status Pink;Flushed;Patent 10/25/22 1834   Action Taken Blood drawn 10/25/22 1834        Opportunity for questions and clarification was provided.       Patient transported with:   Monitor  Registered Nurse

## 2022-10-26 NOTE — PROGRESS NOTES
BP still uncontrolled despite PO and IV meds, will start Nicardipine drip and transfer to intermediate care.

## 2022-10-27 LAB
ANION GAP SERPL CALC-SCNC: 6 MMOL/L (ref 5–15)
BASOPHILS # BLD: 0 K/UL (ref 0–0.1)
BASOPHILS NFR BLD: 0 % (ref 0–1)
BUN SERPL-MCNC: 35 MG/DL (ref 6–20)
BUN/CREAT SERPL: 7 (ref 12–20)
CALCIUM SERPL-MCNC: 7.5 MG/DL (ref 8.5–10.1)
CHLORIDE SERPL-SCNC: 118 MMOL/L (ref 97–108)
CO2 SERPL-SCNC: 20 MMOL/L (ref 21–32)
CREAT SERPL-MCNC: 4.7 MG/DL (ref 0.55–1.02)
DIFFERENTIAL METHOD BLD: ABNORMAL
EOSINOPHIL # BLD: 0 K/UL (ref 0–0.4)
EOSINOPHIL NFR BLD: 1 % (ref 0–7)
ERYTHROCYTE [DISTWIDTH] IN BLOOD BY AUTOMATED COUNT: 14.5 % (ref 11.5–14.5)
GLUCOSE BLD STRIP.AUTO-MCNC: 110 MG/DL (ref 65–117)
GLUCOSE BLD STRIP.AUTO-MCNC: 120 MG/DL (ref 65–117)
GLUCOSE BLD STRIP.AUTO-MCNC: 135 MG/DL (ref 65–117)
GLUCOSE BLD STRIP.AUTO-MCNC: 83 MG/DL (ref 65–117)
GLUCOSE BLD STRIP.AUTO-MCNC: 97 MG/DL (ref 65–117)
GLUCOSE SERPL-MCNC: 95 MG/DL (ref 65–100)
HCT VFR BLD AUTO: 28 % (ref 35–47)
HGB BLD-MCNC: 8.8 G/DL (ref 11.5–16)
IMM GRANULOCYTES # BLD AUTO: 0 K/UL (ref 0–0.04)
IMM GRANULOCYTES NFR BLD AUTO: 0 % (ref 0–0.5)
LYMPHOCYTES # BLD: 0.7 K/UL (ref 0.8–3.5)
LYMPHOCYTES NFR BLD: 25 % (ref 12–49)
MCH RBC QN AUTO: 27.7 PG (ref 26–34)
MCHC RBC AUTO-ENTMCNC: 31.4 G/DL (ref 30–36.5)
MCV RBC AUTO: 88.1 FL (ref 80–99)
MONOCYTES # BLD: 0.4 K/UL (ref 0–1)
MONOCYTES NFR BLD: 14 % (ref 5–13)
NEUTS SEG # BLD: 1.8 K/UL (ref 1.8–8)
NEUTS SEG NFR BLD: 60 % (ref 32–75)
NRBC # BLD: 0 K/UL (ref 0–0.01)
NRBC BLD-RTO: 0 PER 100 WBC
PLATELET # BLD AUTO: 116 K/UL (ref 150–400)
PMV BLD AUTO: 12.1 FL (ref 8.9–12.9)
POTASSIUM SERPL-SCNC: 3.6 MMOL/L (ref 3.5–5.1)
RBC # BLD AUTO: 3.18 M/UL (ref 3.8–5.2)
RBC MORPH BLD: ABNORMAL
SERVICE CMNT-IMP: ABNORMAL
SERVICE CMNT-IMP: ABNORMAL
SERVICE CMNT-IMP: NORMAL
SODIUM SERPL-SCNC: 144 MMOL/L (ref 136–145)
WBC # BLD AUTO: 2.9 K/UL (ref 3.6–11)

## 2022-10-27 PROCEDURE — 65270000046 HC RM TELEMETRY

## 2022-10-27 PROCEDURE — 80048 BASIC METABOLIC PNL TOTAL CA: CPT

## 2022-10-27 PROCEDURE — 74011250637 HC RX REV CODE- 250/637: Performed by: FAMILY MEDICINE

## 2022-10-27 PROCEDURE — 74011000250 HC RX REV CODE- 250: Performed by: INTERNAL MEDICINE

## 2022-10-27 PROCEDURE — 74011250636 HC RX REV CODE- 250/636: Performed by: INTERNAL MEDICINE

## 2022-10-27 PROCEDURE — 36415 COLL VENOUS BLD VENIPUNCTURE: CPT

## 2022-10-27 PROCEDURE — 74011636637 HC RX REV CODE- 636/637: Performed by: FAMILY MEDICINE

## 2022-10-27 PROCEDURE — 85025 COMPLETE CBC W/AUTO DIFF WBC: CPT

## 2022-10-27 PROCEDURE — 74011250637 HC RX REV CODE- 250/637: Performed by: NURSE PRACTITIONER

## 2022-10-27 PROCEDURE — 82962 GLUCOSE BLOOD TEST: CPT

## 2022-10-27 PROCEDURE — 74011250637 HC RX REV CODE- 250/637: Performed by: INTERNAL MEDICINE

## 2022-10-27 RX ORDER — ZOLPIDEM TARTRATE 5 MG/1
5 TABLET ORAL
Status: DISCONTINUED | OUTPATIENT
Start: 2022-10-27 | End: 2022-11-09 | Stop reason: HOSPADM

## 2022-10-27 RX ORDER — SODIUM BICARBONATE 650 MG/1
650 TABLET ORAL 3 TIMES DAILY
Status: DISCONTINUED | OUTPATIENT
Start: 2022-10-27 | End: 2022-10-29

## 2022-10-27 RX ORDER — MAGNESIUM SULFATE HEPTAHYDRATE 40 MG/ML
2 INJECTION, SOLUTION INTRAVENOUS ONCE
Status: COMPLETED | OUTPATIENT
Start: 2022-10-27 | End: 2022-10-27

## 2022-10-27 RX ORDER — LANOLIN ALCOHOL/MO/W.PET/CERES
3 CREAM (GRAM) TOPICAL
Status: DISCONTINUED | OUTPATIENT
Start: 2022-10-27 | End: 2022-11-09 | Stop reason: HOSPADM

## 2022-10-27 RX ADMIN — Medication 125 MG: at 23:09

## 2022-10-27 RX ADMIN — INSULIN GLARGINE 26 UNITS: 100 INJECTION, SOLUTION SUBCUTANEOUS at 21:27

## 2022-10-27 RX ADMIN — Medication 3 MG: at 03:15

## 2022-10-27 RX ADMIN — HYDRALAZINE HYDROCHLORIDE 10 MG: 20 INJECTION INTRAMUSCULAR; INTRAVENOUS at 05:11

## 2022-10-27 RX ADMIN — Medication 125 MG: at 12:24

## 2022-10-27 RX ADMIN — Medication 125 MG: at 06:05

## 2022-10-27 RX ADMIN — Medication 125 MG: at 18:25

## 2022-10-27 RX ADMIN — SODIUM CHLORIDE 7.5 MG/HR: 9 INJECTION, SOLUTION INTRAVENOUS at 00:30

## 2022-10-27 RX ADMIN — SODIUM CHLORIDE 100 ML/HR: 9 INJECTION, SOLUTION INTRAVENOUS at 03:08

## 2022-10-27 RX ADMIN — ZOLPIDEM TARTRATE 5 MG: 5 TABLET ORAL at 21:27

## 2022-10-27 RX ADMIN — NIFEDIPINE 30 MG: 30 TABLET, FILM COATED, EXTENDED RELEASE ORAL at 08:42

## 2022-10-27 RX ADMIN — Medication 125 MG: at 00:31

## 2022-10-27 RX ADMIN — ONDANSETRON 4 MG: 2 INJECTION INTRAMUSCULAR; INTRAVENOUS at 06:53

## 2022-10-27 RX ADMIN — MAGNESIUM SULFATE HEPTAHYDRATE 2 G: 40 INJECTION, SOLUTION INTRAVENOUS at 12:08

## 2022-10-27 RX ADMIN — NIFEDIPINE 30 MG: 30 TABLET, FILM COATED, EXTENDED RELEASE ORAL at 17:30

## 2022-10-27 RX ADMIN — SODIUM BICARBONATE 650 MG: 650 TABLET ORAL at 16:15

## 2022-10-27 RX ADMIN — SODIUM BICARBONATE 650 MG: 650 TABLET ORAL at 08:49

## 2022-10-27 RX ADMIN — ATORVASTATIN CALCIUM 40 MG: 40 TABLET, FILM COATED ORAL at 08:41

## 2022-10-27 RX ADMIN — CARVEDILOL 25 MG: 12.5 TABLET, FILM COATED ORAL at 17:30

## 2022-10-27 RX ADMIN — CARVEDILOL 25 MG: 12.5 TABLET, FILM COATED ORAL at 08:42

## 2022-10-27 RX ADMIN — SODIUM CHLORIDE 50 ML/HR: 9 INJECTION, SOLUTION INTRAVENOUS at 23:10

## 2022-10-27 RX ADMIN — HYDRALAZINE HYDROCHLORIDE 10 MG: 20 INJECTION INTRAMUSCULAR; INTRAVENOUS at 23:09

## 2022-10-27 RX ADMIN — SODIUM BICARBONATE 650 MG: 650 TABLET ORAL at 21:27

## 2022-10-27 RX ADMIN — HYDRALAZINE HYDROCHLORIDE 10 MG: 20 INJECTION INTRAMUSCULAR; INTRAVENOUS at 12:09

## 2022-10-27 NOTE — PROGRESS NOTES
1600: Bedside shift change report given to Eddi Tapia RN (oncoming nurse) by Catia Gonzalez RN (offgoing nurse). Report included the following information SBAR, Kardex, ED Summary, OR Summary, Procedure Summary, Intake/Output, MAR, Recent Results, Cardiac Rhythm NSR, and Dual Neuro Assessment. 1930: Bedside shift change report given to Sherlyn Brown RN (oncoming nurse) by Eddi Tapia RN (offgoing nurse). Report included the following information SBAR, Kardex, ED Summary, OR Summary, Procedure Summary, Intake/Output, MAR, Recent Results, Cardiac Rhythm NSR, and Dual Neuro Assessment.

## 2022-10-27 NOTE — PROGRESS NOTES
1930-Bedside shift change report given to Misael Eddy RN (oncoming nurse) by Ericka Melara RN (offgoing nurse). Report included the following information SBAR, Kardex, Intake/Output, MAR, Recent Results, and Cardiac Rhythm NSR . Drips: [email or web address]    Primary Nurse Terrence Singh and Elbert Carrel, RN performed a dual skin assessment on this patient No impairment noted    Current Bed:   [facility]  Dar score is 23    2329-Pt tested positive for c-diff, NP informed via Perfect Serve.    0600-Pt feeling lightheaded and dizzy on commode. Pt stated she was straining to have a BM. Pt educated on need to not strain. Pt tremorous when helped back to bed. , VSS.    0730-Bedside shift change report given to Deepti Hua RN (oncoming nurse) by Misael Eddy RN (offgoing nurse). Report included the following information SBAR, Kardex, Intake/Output, MAR, Recent Results, and Cardiac Rhythm NSR/SB .      Drips: [email or web address]

## 2022-10-27 NOTE — PROGRESS NOTES
[facility] Adult  Hospitalist Group                                                                                          Hospitalist Progress Note  Zoe Gayle MD  Answering service: [phone] or [phone] from in house phone        Date of Service:  10/27/2022  NAME:  Nano Mead  :  1960  MRN:  [id number]      Admission Summary:   The patient is 65years old woman with past medical history significant for chronic kidney disease, diabetes mellitus type 2, GERD, hypertension, hyperlipidemia, right vision loss presented emergency department with a chief complaint of diarrhea and poor appetite. Patient was found to have RAYMOND superimposed on CKD. Interval history / Subjective:   Patient reports she is feeling better this morning. Her diarrhea has improved today, no BM since last night. No abdominal pain.         Assessment & Plan:        RAYMOND superimposed on CKD stage III, improving   - Baseline creatinine around 3.5, presented with creatinine 5.33 and BUN 42  - Likely prerenal from dehydration induced by diarrhea  - Started on IV fluid maintenance, improving  - CT abdomen was ordered on admission with no hydronephrosis or obstruction  - Nephrology consulted, appreciate recommendations     Acute diarrhea secondary to C Difficile Infection, POA  - CT abdomen with no evidence of colitis  - Stool studies positive for acute C Diff infection, started on oral vancomycin 10/26  -- Decreased diarrhea today per patient  -Continue IV fluid support    Hypertensive emergency, POA, improving   - Presented with systolic blood pressure more than 180 mmHg  - Previously on Cardene drip yesterday, weaned off overnight last night   - Continue home medication with Coreg 25 mg twice daily, nifedipine 30 mg twice daily    Asymptomatic bacteriuria, POA  - UA on admission with bacteriuria +1 but no pyuria or leukocyte esterase  - Patient does not have symptoms of UTI i.e. dysuria/urinary frequency/urgency  - No indication to treat her asymptomatic bacteriuria    Chronic normocytic anemia  - Hemoglobin stable around baseline    Diabetes mellitus type 2, uncontrolled  - Continue home insulin regimen  - Correction scale with hypoglycemia protocol/high-sensitivity due to her CKD    History of CVA  - With no residual  Hyperlipidemia  - Continue home statin    Obesity  - BMI 34 kg/m²  - Counseling was provided about weight loss     Code status: DNR  Prophylaxis: Heparin  Care Plan discussed with: Patient, nursing  Anticipated Disposition: 24-48h, pending improvement in RAYMOND and stability of c diff infection on oral vanc     Hospital Problems  Date Reviewed: 8/23/2022            Codes Class Noted POA    Diarrhea ICD-10-CM: R19.7  ICD-9-CM: 787.91  10/25/2022 Unknown        RAYMOND (acute kidney injury) [facility]) ICD-10-CM: N17.9  ICD-9-CM: 584.9  10/25/2022 Unknown             Review of Systems:   A comprehensive review of systems was negative except for that written in the HPI. Vital Signs:    Last 24hrs VS reviewed since prior progress note. Most recent are:  Visit Vitals  BP (!) 165/75 (BP 1 Location: Left upper arm, BP Patient Position: At rest)   Pulse 60   Temp 98.4 °F (36.9 °C)   Resp 16   Ht 5' 4\" (1.626 m)   Wt 85.4 kg (188 lb 4.4 oz)   SpO2 99%   BMI 32.32 kg/m²         Intake/Output Summary (Last 24 hours) at 10/27/2022 1253  Last data filed at 10/27/2022 1200  Gross per 24 hour   Intake 4131.67 ml   Output 425 ml   Net 3706.67 ml        Physical Examination:     I had a face to face encounter with this patient and independently examined them on 10/27/2022 as outlined below:          Constitutional:  No acute distress, cooperative, pleasant    ENT:  Oral mucosa moist, oropharynx benign. Resp:  CTA bilaterally. No wheezing/rhonchi/rales. No accessory muscle use. CV:  Regular rhythm, normal rate, no murmurs, gallops, rubs    GI:  Soft, non distended, non tender.  normoactive bowel sounds, no hepatosplenomegaly     Musculoskeletal:  No edema, warm, 2+ pulses throughout    Neurologic:  Moves all extremities. AAOx3, CN II-XII reviewed            Data Review:    Review and/or order of clinical lab test      Labs:     Recent Labs     10/27/22  0306 10/26/22  1131   WBC 2.9* 2.7*   HGB 8.8* 8.2*   HCT 28.0* 26.2*   * 110*     Recent Labs     10/27/22  0306 10/26/22  1131 10/25/22  1831    142 138   K 3.6 3.7 3.8   * 115* 109*   CO2 20* 22 23   BUN 35* 36* 42*   CREA 4.70* 4.81* 5.33*   GLU 95 140* 218*   CA 7.5* 7.4* 8.5   MG  --  1.4*  --    PHOS  --  2.6  --      Recent Labs     10/26/22  1131 10/25/22  1831   ALT 18 23   * 178*   TBILI 0.3 0.3   TP 5.6* 7.7   ALB 3.0* 3.8   GLOB 2.6 3.9     Recent Labs     10/26/22  1131   INR 1.1   PTP 11.6*   APTT 30.9      No results for input(s): FE, TIBC, PSAT, FERR in the last 72 hours. No results found for: FOL, RBCF   No results for input(s): PH, PCO2, PO2 in the last 72 hours. No results for input(s): CPK, CKNDX, TROIQ in the last 72 hours.     No lab exists for component: CPKMB  No results found for: CHOL, CHOLX, CHLST, CHOLV, HDL, HDLP, LDL, LDLC, DLDLP, TGLX, TRIGL, TRIGP, CHHD, CHHDX  Lab Results   Component Value Date/Time    Glucose (POC) 83 10/27/2022 11:56 AM    Glucose (POC) 97 10/27/2022 08:00 AM    Glucose (POC) 110 10/27/2022 06:08 AM    Glucose (POC) 146 (H) 10/26/2022 09:35 PM    Glucose (POC) 138 (H) 10/26/2022 12:21 AM     Lab Results   Component Value Date/Time    Color YELLOW/STRAW 10/26/2022 05:30 AM    Appearance CLEAR 10/26/2022 05:30 AM    Specific gravity 1.011 10/26/2022 05:30 AM    Specific gravity 1.025 04/19/2013 11:30 AM    pH (UA) 6.0 10/26/2022 05:30 AM    Protein 300 (A) 10/26/2022 05:30 AM    Glucose Negative 10/26/2022 05:30 AM    Ketone Negative 10/26/2022 05:30 AM    Bilirubin Negative 10/26/2022 05:30 AM    Urobilinogen 0.2 10/26/2022 05:30 AM    Nitrites Negative 10/26/2022 05:30 AM    Leukocyte Esterase Negative 10/26/2022 05:30 AM    Epithelial cells FEW 10/26/2022 05:30 AM    Bacteria 1+ (A) 10/26/2022 05:30 AM    WBC 0-4 10/26/2022 05:30 AM    RBC 0-5 10/26/2022 05:30 AM         Medications Reviewed:     Current Facility-Administered Medications   Medication Dose Route Frequency    melatonin tablet 3 mg  3 mg Oral QHS PRN    sodium bicarbonate tablet 650 mg  650 mg Oral TID    magnesium sulfate 2 g/50 ml IVPB (premix or compounded)  2 g IntraVENous ONCE    acetaminophen (TYLENOL) tablet 650 mg  650 mg Oral Q6H PRN    NIFEdipine ER (PROCARDIA XL) tablet 30 mg  30 mg Oral BID    ondansetron (ZOFRAN) injection 4 mg  4 mg IntraVENous Q6H PRN    hydrALAZINE (APRESOLINE) 20 mg/mL injection 10 mg  10 mg IntraVENous Q6H PRN    niCARdipine (CARDENE) 25 mg in 0.9% sodium chloride 250 mL ([id number])  0-15 mg/hr IntraVENous TITRATE    glucose chewable tablet 16 g  4 Tablet Oral PRN    glucagon (GLUCAGEN) injection 1 mg  1 mg IntraMUSCular PRN    dextrose 10 % infusion 0-250 mL  0-250 mL IntraVENous PRN    insulin lispro (HUMALOG) injection   SubCUTAneous AC&HS    vancomycin (FIRVANQ) 50 mg/mL oral solution 125 mg  125 mg Oral Q6H    0.9% sodium chloride infusion  50 mL/hr IntraVENous CONTINUOUS    atorvastatin (LIPITOR) tablet 40 mg  40 mg Oral DAILY    carvediloL (COREG) tablet 25 mg  25 mg Oral BID WITH MEALS    insulin glargine (LANTUS) injection 26 Units  26 Units SubCUTAneous QHS     ______________________________________________________________________  EXPECTED LENGTH OF STAY: 2d 4h  ACTUAL LENGTH OF STAY:          2                 Maureen Ruff MD

## 2022-10-27 NOTE — PROGRESS NOTES
1930-Bedside shift change report given to Soniya Llanos RN (oncoming nurse) by Alexandru Leon RN (offgoing nurse). Report included the following information SBAR, Kardex, Intake/Output, MAR, Recent Results, and Cardiac Rhythm NSR . Drips: [email or web address]    2047-NP messaged via Perfect Serve at pt request. Pt complaining of insomnia the past two nights, states melatonin only makes her \"jittery\" at home. Pt endorses taking ambien for sleep at home. NP informed. 2145-Pt requested that her daughter be informed of planned room change, requested that a voicemail be left if she did not answer. Attempted to contact daughter, Rere Hicks compliant voicemail left regarding room change. 2330-TRANSFER - OUT REPORT:    Verbal report given to Rufus Grijalva RN(name) on [facility]  being transferred to (unit) for routine progression of care       Report consisted of patients Situation, Background, Assessment and   Recommendations(SBAR). Information from the following report(s) SBAR, Kardex, Intake/Output, MAR, Recent Results, and Cardiac Rhythm NSR/sinus juan  was reviewed with the receiving nurse. Lines:   Peripheral IV 10/25/22 Right Antecubital (Active)   Site Assessment Clean, dry, & intact 10/27/22 2000   Phlebitis Assessment 0 10/27/22 2000   Infiltration Assessment 0 10/27/22 2000   Dressing Status Clean, dry, & intact 10/27/22 2000   Dressing Type Transparent 10/27/22 2000   Hub Color/Line Status Pink; Infusing 10/27/22 2000   Action Taken Open ports on tubing capped 10/27/22 2000   Alcohol Cap Used Yes 10/27/22 2000        Opportunity for questions and clarification was provided.       Patient transported with:   Patient-specific medications from Pharmacy  Registered Nurse

## 2022-10-27 NOTE — PROGRESS NOTES
Renal Progress Note    NAME:  Moncho Oliveira   :   1960   MRN:   [id number]     Date/Time:  10/27/2022 10:23 AM      Assessment:     Acute Kidney Injury --> sec to vol depletion with GI losses - improving  CKD (Stage 4/Stage 5) -> 10/17/22 --> BUN 34 , Creat 4.88  Diarrhea - > likely sec to C Diff  Met Acidosis - sec to CKD  Hypomagnesemia       Plan:     GFR has improved with hydration. This is encouraging  Decrease IVF to 50 mls/hour. Adjust Na Bicarb to 650 MG TID  Replete Mg  Encourage oral intake. Avoid NSAIDs + IV contrast.  Dose meds for GFR. Subjective:         F/U - RAYMOND, CKD --> 10/27/22 - Shaniqua    No new complaints were offered.       Review of Systems:  Y  N       Y  N  []         []          Fever/chills                                               []         []          Chest Pain  []         []          Cough                                                       []         []          Diarrhea   []         []          Sputum                                                     []         []          Constipation  []         []          SOB/WALLACE                                                []         []          Nausea/Vomit  []         []          Abd Pain                                                    []         []          Tolerating PT  []         []          Dysuria                                                      []         []          Tolerating Diet     []        Unable to obtain  ROS due to  []        mental status change  []        sedated   []        intubated    Medications reviewed:  Current Facility-Administered Medications   Medication Dose Route Frequency    melatonin tablet 3 mg  3 mg Oral QHS PRN    sodium bicarbonate tablet 650 mg  650 mg Oral TID    acetaminophen (TYLENOL) tablet 650 mg  650 mg Oral Q6H PRN    NIFEdipine ER (PROCARDIA XL) tablet 30 mg  30 mg Oral BID    ondansetron (ZOFRAN) injection 4 mg  4 mg IntraVENous Q6H PRN    hydrALAZINE (APRESOLINE) 20 mg/mL injection 10 mg  10 mg IntraVENous Q6H PRN    niCARdipine (CARDENE) 25 mg in 0.9% sodium chloride 250 mL ([id number])  0-15 mg/hr IntraVENous TITRATE    glucose chewable tablet 16 g  4 Tablet Oral PRN    glucagon (GLUCAGEN) injection 1 mg  1 mg IntraMUSCular PRN    dextrose 10 % infusion 0-250 mL  0-250 mL IntraVENous PRN    insulin lispro (HUMALOG) injection   SubCUTAneous AC&HS    vancomycin (FIRVANQ) 50 mg/mL oral solution 125 mg  125 mg Oral Q6H    0.9% sodium chloride infusion  100 mL/hr IntraVENous CONTINUOUS    atorvastatin (LIPITOR) tablet 40 mg  40 mg Oral DAILY    carvediloL (COREG) tablet 25 mg  25 mg Oral BID WITH MEALS    insulin glargine (LANTUS) injection 26 Units  26 Units SubCUTAneous QHS        Objective:   Vitals:  Visit Vitals  BP (!) 153/71   Pulse 67   Temp 98.1 °F (36.7 °C)   Resp 24   Ht 5' 4\" (1.626 m)   Wt 85.4 kg (188 lb 4.4 oz)   LMP 10/07/2012   SpO2 98%   BMI 32.32 kg/m²     Temp (24hrs), Av.5 °F (36.9 °C), Min:98.1 °F (36.7 °C), Max:98.9 °F (37.2 °C)      O2 Device: None (Room air)    Last 24hr Input/Output:    Intake/Output Summary (Last 24 hours) at 10/27/2022 1023  Last data filed at 10/27/2022 0800  Gross per 24 hour   Intake 3732.5 ml   Output 425 ml   Net 3307.5 ml        PHYSICAL EXAM:    Seen in CCU 24    General:    Comfortable. Eyes:   No icterus    Lungs:   Clear to auscultation , No rales. Heart:   No S 3 gallop, no pericardial rub  . Abdomen:   Not distended. Extremities: No significant leg oedema    Psych:  Calm    Neurologic: Responding appropriately.         []        Telemetry Reviewed     []        NSR []        PAC/PVCs   []        Afib  []        Paced   []        NSVT   []        Hough []        NGT  []        Intubated on vent    Lab Data Reviewed:    Recent Results (from the past 24 hour(s))   CBC WITH AUTOMATED DIFF    Collection Time: 10/26/22 11:31 AM   Result Value Ref Range    WBC 2.7 (L) 3.6 - 11.0 K/uL    RBC 2.98 (L) 3.80 - 5.20 M/uL    HGB 8.2 (L) 11.5 - 16.0 g/dL    HCT 26.2 (L) 35.0 - 47.0 %    MCV 87.9 80.0 - 99.0 FL    MCH 27.5 26.0 - 34.0 PG    MCHC 31.3 30.0 - 36.5 g/dL    RDW 14.5 11.5 - 14.5 %    PLATELET 338 (L) 860 - 400 K/uL    MPV 12.5 8.9 - 12.9 FL    NRBC 0.0 0  WBC    ABSOLUTE NRBC 0.00 0.00 - 0.01 K/uL    NEUTROPHILS 62 32 - 75 %    LYMPHOCYTES 20 12 - 49 %    MONOCYTES 18 (H) 5 - 13 %    EOSINOPHILS 0 0 - 7 %    BASOPHILS 0 0 - 1 %    IMMATURE GRANULOCYTES 0 0.0 - 0.5 %    ABS. NEUTROPHILS 1.7 (L) 1.8 - 8.0 K/UL    ABS. LYMPHOCYTES 0.5 (L) 0.8 - 3.5 K/UL    ABS. MONOCYTES 0.5 0.0 - 1.0 K/UL    ABS. EOSINOPHILS 0.0 0.0 - 0.4 K/UL    ABS. BASOPHILS 0.0 0.0 - 0.1 K/UL    ABS. IMM. GRANS. 0.0 0.00 - 0.04 K/UL    DF SMEAR SCANNED      PLATELET COMMENTS Large Platelets      RBC COMMENTS NORMOCYTIC, NORMOCHROMIC     METABOLIC PANEL, COMPREHENSIVE    Collection Time: 10/26/22 11:31 AM   Result Value Ref Range    Sodium 142 136 - 145 mmol/L    Potassium 3.7 3.5 - 5.1 mmol/L    Chloride 115 (H) 97 - 108 mmol/L    CO2 22 21 - 32 mmol/L    Anion gap 5 5 - 15 mmol/L    Glucose 140 (H) 65 - 100 mg/dL    BUN 36 (H) 6 - 20 MG/DL    Creatinine 4.81 (H) 0.55 - 1.02 MG/DL    BUN/Creatinine ratio 7 (L) 12 - 20      eGFR 10 (L) >60 ml/min/1.73m2    Calcium 7.4 (L) 8.5 - 10.1 MG/DL    Bilirubin, total 0.3 0.2 - 1.0 MG/DL    ALT (SGPT) 18 12 - 78 U/L    AST (SGOT) 14 (L) 15 - 37 U/L    Alk.  phosphatase 139 (H) 45 - 117 U/L    Protein, total 5.6 (L) 6.4 - 8.2 g/dL    Albumin 3.0 (L) 3.5 - 5.0 g/dL    Globulin 2.6 2.0 - 4.0 g/dL    A-G Ratio 1.2 1.1 - 2.2     MAGNESIUM    Collection Time: 10/26/22 11:31 AM   Result Value Ref Range    Magnesium 1.4 (L) 1.6 - 2.4 mg/dL   PROTHROMBIN TIME + INR    Collection Time: 10/26/22 11:31 AM   Result Value Ref Range    INR 1.1 0.9 - 1.1      Prothrombin time 11.6 (H) 9.0 - 11.1 sec   PTT    Collection Time: 10/26/22 11:31 AM   Result Value Ref Range    aPTT 30.9 22.1 - 31.0 sec    aPTT, therapeutic range     58.0 - 77.0 SECS   PHOSPHORUS    Collection Time: 10/26/22 11:31 AM   Result Value Ref Range    Phosphorus 2.6 2.6 - 4.7 MG/DL   ENTERIC BACTERIA PANEL, DNA    Collection Time: 10/26/22  2:44 PM   Result Value Ref Range    Shigella species, DNA Negative NEG      Campylobacter species, DNA Negative NEG      Vibrio species, DNA Negative NEG      Enterotoxigen E Coli, DNA Negative NEG      Shiga toxin producing, DNA Negative NEG      Salmonella species, DNA Negative NEG      P. shigelloides, DNA Negative NEG      Y. enterocolitica, DNA Negative NEG     C. DIFFICILE AG & TOXIN A/B    Collection Time: 10/26/22  2:44 PM   Result Value Ref Range    PCR Reflex See Reflex order for C. difficile (DNA)      INTERPRETATION (A) NTXCD       Indeterminate for Toxigenic C. difficile, DNA confirmation to follow.    C. DIFFICILE (DNA)    Collection Time: 10/26/22  2:44 PM   Result Value Ref Range    C. difficile (DNA) Positive (AA) NEG     SODIUM, UR, RANDOM    Collection Time: 10/26/22  2:47 PM   Result Value Ref Range    Sodium,urine random 90 MMOL/L   CHLORIDE, URINE RANDOM    Collection Time: 10/26/22  2:47 PM   Result Value Ref Range    Chloride,urine random 91 MMOL/L   CREATININE, UR, RANDOM    Collection Time: 10/26/22  2:47 PM   Result Value Ref Range    Creatinine, urine random 79.90 mg/dL   PROTEIN URINE, RANDOM    Collection Time: 10/26/22  2:47 PM   Result Value Ref Range    Protein, urine random 166 (H) 0.0 - 11.9 mg/dL   GLUCOSE, POC    Collection Time: 10/26/22  9:35 PM   Result Value Ref Range    Glucose (POC) 146 (H) 65 - 117 mg/dL    Performed by [address] [place] [address], BASIC    Collection Time: 10/27/22  3:06 AM   Result Value Ref Range    Sodium 144 136 - 145 mmol/L    Potassium 3.6 3.5 - 5.1 mmol/L    Chloride 118 (H) 97 - 108 mmol/L    CO2 20 (L) 21 - 32 mmol/L    Anion gap 6 5 - 15 mmol/L    Glucose 95 65 - 100 mg/dL    BUN 35 (H) 6 - 20 MG/DL    Creatinine 4.70 (H) 0.55 - 1.02 MG/DL BUN/Creatinine ratio 7 (L) 12 - 20      eGFR 10 (L) >60 ml/min/1.73m2    Calcium 7.5 (L) 8.5 - 10.1 MG/DL   CBC WITH AUTOMATED DIFF    Collection Time: 10/27/22  3:06 AM   Result Value Ref Range    WBC 2.9 (L) 3.6 - 11.0 K/uL    RBC 3.18 (L) 3.80 - 5.20 M/uL    HGB 8.8 (L) 11.5 - 16.0 g/dL    HCT 28.0 (L) 35.0 - 47.0 %    MCV 88.1 80.0 - 99.0 FL    MCH 27.7 26.0 - 34.0 PG    MCHC 31.4 30.0 - 36.5 g/dL    RDW 14.5 11.5 - 14.5 %    PLATELET 317 (L) 189 - 400 K/uL    MPV 12.1 8.9 - 12.9 FL    NRBC 0.0 0  WBC    ABSOLUTE NRBC 0.00 0.00 - 0.01 K/uL    NEUTROPHILS 60 32 - 75 %    LYMPHOCYTES 25 12 - 49 %    MONOCYTES 14 (H) 5 - 13 %    EOSINOPHILS 1 0 - 7 %    BASOPHILS 0 0 - 1 %    IMMATURE GRANULOCYTES 0 0.0 - 0.5 %    ABS. NEUTROPHILS 1.8 1.8 - 8.0 K/UL    ABS. LYMPHOCYTES 0.7 (L) 0.8 - 3.5 K/UL    ABS. MONOCYTES 0.4 0.0 - 1.0 K/UL    ABS. EOSINOPHILS 0.0 0.0 - 0.4 K/UL    ABS. BASOPHILS 0.0 0.0 - 0.1 K/UL    ABS. IMM.  GRANS. 0.0 0.00 - 0.04 K/UL    DF SMEAR SCANNED      RBC COMMENTS ANISOCYTOSIS  1+        RBC COMMENTS OVALOCYTES  1+        RBC COMMENTS BASOPHILIC STIPPLING  SCHISTOCYTES  PRESENT       GLUCOSE, POC    Collection Time: 10/27/22  6:08 AM   Result Value Ref Range    Glucose (POC) 110 65 - 117 mg/dL    Performed by Ras Martin [address], POC    Collection Time: 10/27/22  8:00 AM   Result Value Ref Range    Glucose (POC) 97 65 - 117 mg/dL    Performed by Flaco Mcwilliams RN        Total time spent with patient:  []        15   []        25   []        35   []         __ minutes    []        Critical Care Provided    Care Plan discussed with:    []        Patient   []        Family    []        Care Manager   []        Consultant/Specialist :      []          >50% of visit spent in counseling and coordination of care   (Discussed []        CODE status,  []        Care Plan, []        D/C Planning)    ___________________________________________________    Attending Physician: Eduardo Ballard MD

## 2022-10-28 LAB
ALBUMIN SERPL-MCNC: 3.1 G/DL (ref 3.5–5)
ANION GAP SERPL CALC-SCNC: 8 MMOL/L (ref 5–15)
BUN SERPL-MCNC: 33 MG/DL (ref 6–20)
BUN/CREAT SERPL: 7 (ref 12–20)
CALCIUM SERPL-MCNC: 7.5 MG/DL (ref 8.5–10.1)
CHLORIDE SERPL-SCNC: 117 MMOL/L (ref 97–108)
CO2 SERPL-SCNC: 20 MMOL/L (ref 21–32)
COMMENT, HOLDF: NORMAL
CREAT SERPL-MCNC: 4.69 MG/DL (ref 0.55–1.02)
GLUCOSE BLD STRIP.AUTO-MCNC: 102 MG/DL (ref 65–117)
GLUCOSE BLD STRIP.AUTO-MCNC: 118 MG/DL (ref 65–117)
GLUCOSE BLD STRIP.AUTO-MCNC: 146 MG/DL (ref 65–117)
GLUCOSE BLD STRIP.AUTO-MCNC: 156 MG/DL (ref 65–117)
GLUCOSE BLD STRIP.AUTO-MCNC: 48 MG/DL (ref 65–117)
GLUCOSE BLD STRIP.AUTO-MCNC: 86 MG/DL (ref 65–117)
GLUCOSE SERPL-MCNC: 52 MG/DL (ref 65–100)
MAGNESIUM SERPL-MCNC: 1.8 MG/DL (ref 1.6–2.4)
PHOSPHATE SERPL-MCNC: 2.6 MG/DL (ref 2.6–4.7)
POTASSIUM SERPL-SCNC: 3.5 MMOL/L (ref 3.5–5.1)
SAMPLES BEING HELD,HOLD: NORMAL
SERVICE CMNT-IMP: ABNORMAL
SERVICE CMNT-IMP: NORMAL
SERVICE CMNT-IMP: NORMAL
SODIUM SERPL-SCNC: 145 MMOL/L (ref 136–145)

## 2022-10-28 PROCEDURE — 74011250636 HC RX REV CODE- 250/636: Performed by: INTERNAL MEDICINE

## 2022-10-28 PROCEDURE — 74011250637 HC RX REV CODE- 250/637: Performed by: NURSE PRACTITIONER

## 2022-10-28 PROCEDURE — 82962 GLUCOSE BLOOD TEST: CPT

## 2022-10-28 PROCEDURE — 74011250637 HC RX REV CODE- 250/637: Performed by: FAMILY MEDICINE

## 2022-10-28 PROCEDURE — 74011636637 HC RX REV CODE- 636/637: Performed by: STUDENT IN AN ORGANIZED HEALTH CARE EDUCATION/TRAINING PROGRAM

## 2022-10-28 PROCEDURE — 65270000046 HC RM TELEMETRY

## 2022-10-28 PROCEDURE — 74011250637 HC RX REV CODE- 250/637: Performed by: INTERNAL MEDICINE

## 2022-10-28 PROCEDURE — 80069 RENAL FUNCTION PANEL: CPT

## 2022-10-28 PROCEDURE — 74011636637 HC RX REV CODE- 636/637: Performed by: NURSE PRACTITIONER

## 2022-10-28 PROCEDURE — 83735 ASSAY OF MAGNESIUM: CPT

## 2022-10-28 PROCEDURE — 74011250637 HC RX REV CODE- 250/637: Performed by: STUDENT IN AN ORGANIZED HEALTH CARE EDUCATION/TRAINING PROGRAM

## 2022-10-28 PROCEDURE — 36415 COLL VENOUS BLD VENIPUNCTURE: CPT

## 2022-10-28 RX ORDER — SODIUM CHLORIDE, SODIUM LACTATE, POTASSIUM CHLORIDE, CALCIUM CHLORIDE 600; 310; 30; 20 MG/100ML; MG/100ML; MG/100ML; MG/100ML
75 INJECTION, SOLUTION INTRAVENOUS CONTINUOUS
Status: DISCONTINUED | OUTPATIENT
Start: 2022-10-28 | End: 2022-10-30

## 2022-10-28 RX ORDER — MAGNESIUM SULFATE HEPTAHYDRATE 40 MG/ML
2 INJECTION, SOLUTION INTRAVENOUS ONCE
Status: COMPLETED | OUTPATIENT
Start: 2022-10-28 | End: 2022-10-28

## 2022-10-28 RX ORDER — NIFEDIPINE 30 MG/1
30 TABLET, EXTENDED RELEASE ORAL ONCE
Status: COMPLETED | OUTPATIENT
Start: 2022-10-28 | End: 2022-10-28

## 2022-10-28 RX ORDER — NIFEDIPINE 60 MG/1
60 TABLET, EXTENDED RELEASE ORAL 2 TIMES DAILY
Status: DISCONTINUED | OUTPATIENT
Start: 2022-10-28 | End: 2022-11-09 | Stop reason: HOSPADM

## 2022-10-28 RX ORDER — INSULIN GLARGINE 100 [IU]/ML
22 INJECTION, SOLUTION SUBCUTANEOUS
Status: DISCONTINUED | OUTPATIENT
Start: 2022-10-28 | End: 2022-10-29

## 2022-10-28 RX ADMIN — ZOLPIDEM TARTRATE 5 MG: 5 TABLET ORAL at 21:35

## 2022-10-28 RX ADMIN — Medication 16 G: at 07:29

## 2022-10-28 RX ADMIN — NIFEDIPINE 30 MG: 30 TABLET, EXTENDED RELEASE ORAL at 10:27

## 2022-10-28 RX ADMIN — NIFEDIPINE 60 MG: 60 TABLET, EXTENDED RELEASE ORAL at 17:21

## 2022-10-28 RX ADMIN — INSULIN GLARGINE 22 UNITS: 100 INJECTION, SOLUTION SUBCUTANEOUS at 21:23

## 2022-10-28 RX ADMIN — HYDRALAZINE HYDROCHLORIDE 10 MG: 20 INJECTION INTRAMUSCULAR; INTRAVENOUS at 03:18

## 2022-10-28 RX ADMIN — SODIUM CHLORIDE, POTASSIUM CHLORIDE, SODIUM LACTATE AND CALCIUM CHLORIDE 50 ML/HR: 600; 310; 30; 20 INJECTION, SOLUTION INTRAVENOUS at 11:40

## 2022-10-28 RX ADMIN — Medication 2 UNITS: at 13:08

## 2022-10-28 RX ADMIN — NIFEDIPINE 30 MG: 30 TABLET, FILM COATED, EXTENDED RELEASE ORAL at 08:42

## 2022-10-28 RX ADMIN — SODIUM BICARBONATE 650 MG: 650 TABLET ORAL at 08:42

## 2022-10-28 RX ADMIN — MAGNESIUM SULFATE HEPTAHYDRATE 2 G: 40 INJECTION, SOLUTION INTRAVENOUS at 11:37

## 2022-10-28 RX ADMIN — Medication 125 MG: at 05:39

## 2022-10-28 RX ADMIN — ATORVASTATIN CALCIUM 40 MG: 40 TABLET, FILM COATED ORAL at 08:42

## 2022-10-28 RX ADMIN — SODIUM BICARBONATE 650 MG: 650 TABLET ORAL at 17:21

## 2022-10-28 RX ADMIN — CARVEDILOL 25 MG: 12.5 TABLET, FILM COATED ORAL at 08:42

## 2022-10-28 RX ADMIN — Medication 125 MG: at 17:21

## 2022-10-28 RX ADMIN — SODIUM BICARBONATE 650 MG: 650 TABLET ORAL at 21:23

## 2022-10-28 RX ADMIN — Medication 125 MG: at 11:41

## 2022-10-28 NOTE — PROGRESS NOTES
Renal Progress Note    NAME:  Isela Araya   :   1960   MRN:   [id number]     Date/Time:  10/28/2022 10:23 AM      Assessment:     Acute Kidney Injury --> sec to vol depletion with GI losses - improving  Na increasing with low bicarb  CKD (Stage 4/Stage 5) -> 10/17/22 --> BUN 34 , Creat 4.88  C Diff diarrhea  Met Acidosis - sec to CKD and partly diarrhea  Hypomagnesemia  HTN-uncontrolled  DM2       Plan:     GFR has improved with hydration. Cr at baseline  Change IVF to LR at 50 mls/hour  Adjusted Na Bicarb to 650 MG TID  Replete Mg prn  Encourage oral intake. Avoid NSAIDs + IV contrast.  Dose meds for GFR.   BP control         Subjective:     10/28 feeling ok, still has diarrhea, C diff+ on po vanco, cr at baseline, Na higher-hypoglycemic this am      Review of Systems:  Y  N       Y  N  []         []          Fever/chills                                               []         []          Chest Pain  []         []          Cough                                                       []         []          Diarrhea   []         []          Sputum                                                     []         []          Constipation  []         []          SOB/WALLACE                                                []         []          Nausea/Vomit  []         []          Abd Pain                                                    []         []          Tolerating PT  []         []          Dysuria                                                      []         []          Tolerating Diet     []        Unable to obtain  ROS due to  []        mental status change  []        sedated   []        intubated    Medications reviewed:  Current Facility-Administered Medications   Medication Dose Route Frequency    NIFEdipine ER (PROCARDIA XL) tablet 60 mg  60 mg Oral BID    insulin glargine (LANTUS) injection 22 Units  22 Units SubCUTAneous QHS    melatonin tablet 3 mg  3 mg Oral QHS PRN    sodium bicarbonate tablet 650 mg  650 mg Oral TID    zolpidem (AMBIEN) tablet 5 mg  5 mg Oral QHS PRN    acetaminophen (TYLENOL) tablet 650 mg  650 mg Oral Q6H PRN    ondansetron (ZOFRAN) injection 4 mg  4 mg IntraVENous Q6H PRN    hydrALAZINE (APRESOLINE) 20 mg/mL injection 10 mg  10 mg IntraVENous Q6H PRN    glucose chewable tablet 16 g  4 Tablet Oral PRN    glucagon (GLUCAGEN) injection 1 mg  1 mg IntraMUSCular PRN    dextrose 10 % infusion 0-250 mL  0-250 mL IntraVENous PRN    insulin lispro (HUMALOG) injection   SubCUTAneous AC&HS    vancomycin (FIRVANQ) 50 mg/mL oral solution 125 mg  125 mg Oral Q6H    0.9% sodium chloride infusion  50 mL/hr IntraVENous CONTINUOUS    atorvastatin (LIPITOR) tablet 40 mg  40 mg Oral DAILY    carvediloL (COREG) tablet 25 mg  25 mg Oral BID WITH MEALS        Objective:   Vitals:  Visit Vitals  BP (!) 148/86 (BP 1 Location: Left upper arm, BP Patient Position: Sitting)   Pulse (!) 58   Temp 98.6 °F (37 °C)   Resp 22   Ht 5' 4\" (1.626 m)   Wt 85.4 kg (188 lb 4.4 oz)   LMP 10/07/2012   SpO2 98%   BMI 32.32 kg/m²     Temp (24hrs), Av.3 °F (36.8 °C), Min:98 °F (36.7 °C), Max:98.9 °F (37.2 °C)      O2 Device: None (Room air)    Last 24hr Input/Output:    Intake/Output Summary (Last 24 hours) at 10/28/2022 1037  Last data filed at 10/28/2022 1033  Gross per 24 hour   Intake 1349.17 ml   Output 1 ml   Net 1348.17 ml          PHYSICAL EXAM:    Seen in CCU 24    General:    Comfortable. Eyes:   No icterus    Lungs:   Clear to auscultation , No rales. Heart:   RRR, No S 3 gallop, no pericardial rub  . Abdomen:   Not distended. NT    Extremities: No significant leg oedema    Psych:  Calm    Neurologic: Responding appropriately.         []        Telemetry Reviewed     []        NSR []        PAC/PVCs   []        Afib  []        Paced   []        NSVT   []        Hough []        NGT  []        Intubated on vent    Lab Data Reviewed:    Recent Results (from the past 24 hour(s))   GLUCOSE, POC    Collection Time: 10/27/22 11:56 AM   Result Value Ref Range    Glucose (POC) 83 65 - 117 mg/dL    Performed by Daniel Izaguirre  RN    GLUCOSE, POC    Collection Time: 10/27/22  6:01 PM   Result Value Ref Range    Glucose (POC) 135 (H) 65 - 117 mg/dL    Performed by Nancy HORVATH(RN)    GLUCOSE, POC    Collection Time: 10/27/22  9:31 PM   Result Value Ref Range    Glucose (POC) 120 (H) 65 - 117 mg/dL    Performed by Cesario Frank 32    Collection Time: 10/28/22  5:53 AM   Result Value Ref Range    Magnesium 1.8 1.6 - 2.4 mg/dL   RENAL FUNCTION PANEL    Collection Time: 10/28/22  5:53 AM   Result Value Ref Range    Sodium 145 136 - 145 mmol/L    Potassium 3.5 3.5 - 5.1 mmol/L    Chloride 117 (H) 97 - 108 mmol/L    CO2 20 (L) 21 - 32 mmol/L    Anion gap 8 5 - 15 mmol/L    Glucose 52 (LL) 65 - 100 mg/dL    BUN 33 (H) 6 - 20 MG/DL    Creatinine 4.69 (H) 0.55 - 1.02 MG/DL    BUN/Creatinine ratio 7 (L) 12 - 20      eGFR 10 (L) >60 ml/min/1.73m2    Calcium 7.5 (L) 8.5 - 10.1 MG/DL    Phosphorus 2.6 2.6 - 4.7 MG/DL    Albumin 3.1 (L) 3.5 - 5.0 g/dL   SAMPLES BEING HELD    Collection Time: 10/28/22  5:53 AM   Result Value Ref Range    SAMPLES BEING HELD 1red     COMMENT        Add-on orders for these samples will be processed based on acceptable specimen integrity and analyte stability, which may vary by analyte.    GLUCOSE, POC    Collection Time: 10/28/22  7:25 AM   Result Value Ref Range    Glucose (POC) 48 (LL) 65 - 117 mg/dL    Performed by Kathy Castañeda, POC    Collection Time: 10/28/22  7:54 AM   Result Value Ref Range    Glucose (POC) 86 65 - 117 mg/dL    Performed by Sorin Rendon        Total time spent with patient:  []        15   []        25   []        35   []         __ minutes    []        Critical Care Provided    Care Plan discussed with:    []        Patient   [x]        Family    []        Care Manager   []        Consultant/Specialist :      []          >50% of visit spent in counseling and coordination of care   (Discussed []        CODE status,  []        Care Plan, []        D/C Planning)    ___________________________________________________    Attending Physician: Phoenix Sauer MD

## 2022-10-28 NOTE — PROGRESS NOTES
2330: TRANSFER - IN REPORT:    Verbal report received from Juwan (name) on Hunter Stauffer  being received from CCU (unit) for routine progression of care      Report consisted of patients Situation, Background, Assessment and   Recommendations(SBAR). Information from the following report(s) SBAR, Kardex, Intake/Output, and MAR was reviewed with the receiving nurse. Opportunity for questions and clarification was provided. Assessment completed upon patients arrival to unit and care assumed. 1762: [address]    Patient with hypoglycemic episode(s) at 0726(time) on 10/28/22(date). BG value(s) pre-treatment 50    Was patient symptomatic? [] yes, [x] no  Patient was treated with the following rescue medications/treatments: [] D50                [x] Glucose tablets                [] Glucagon                [x] 4oz juice                [] 6oz reg soda                [] 8oz low fat milk  BG value post-treatment: 86  Once BG treated and value greater than 80mg/dl, pt was provided with the following:  [] snack  [] meal  Name of MD notified:MD   The following orders were received: n/a      0730: Bedside shift change report given to Malachi Fonseca  (oncoming nurse) by George Wilcox  (offgoing nurse). Report included the following information SBAR, Kardex, Intake/Output, and MAR.

## 2022-10-28 NOTE — PROGRESS NOTES
Problem: Falls - Risk of  Goal: *Absence of Falls  Description: Document [place] Fail Fall Risk and appropriate interventions in the flowsheet. Outcome: Progressing Towards Goal  Note: Fall Risk Interventions:            Medication Interventions: Bed/chair exit alarm    Elimination Interventions: Bed/chair exit alarm, Call light in reach, Toileting schedule/hourly rounds              Problem: Patient Education: Go to Patient Education Activity  Goal: Patient/Family Education  Outcome: Progressing Towards Goal     Problem: Risk for Spread of Infection  Goal: Prevent transmission of infectious organism to others  Description: Prevent the transmission of infectious organisms to other patients, staff members, and visitors.   Outcome: Progressing Towards Goal     Problem: Hypertension  Goal: *Blood pressure within specified parameters  Outcome: Progressing Towards Goal     Problem: Diarrhea (Adult and Pediatrics)  Goal: *Absence of diarrhea  Outcome: Progressing Towards Goal

## 2022-10-28 NOTE — PROGRESS NOTES
[address] [facility] Adult  Hospitalist Group                                                                                          Hospitalist Progress Note  Scot Cintron MD  Answering service: [phone] or [phone] from in house phone        Date of Service:  10/28/2022  NAME:  Myron Melo  :  1960  MRN:  [id number]      Admission Summary:   The patient is 65years old woman with past medical history significant for chronic kidney disease, diabetes mellitus type 2, GERD, hypertension, hyperlipidemia, right vision loss presented emergency department with a chief complaint of diarrhea and poor appetite. Patient was found to have RAYMOND superimposed on CKD. Interval history / Subjective:   2 BM overnight, continued to be liquid. No significant abdominal pain.  Appetite        Assessment & Plan:        RAYMOND superimposed on CKD stage III, improving   - Baseline creatinine around 3.5, presented with creatinine 5.33 and BUN 42  - Likely prerenal from dehydration induced by diarrhea  - Started on IV fluid maintenance, improving  - CT abdomen was ordered on admission with no hydronephrosis or obstruction  - Nephrology consulted, appreciate recommendations   -- IVF per nephrology, appreciate recommendations and adjustments     Acute diarrhea secondary to C Difficile Infection, POA  - CT abdomen with no evidence of colitis  - Stool studies positive for acute C Diff infection, started on oral vancomycin 10/26  -- Diarrhea improving, no significant pain  -Continue IV fluid support    Hypertensive emergency, POA, improving   - Presented with systolic blood pressure more than 180 mmHg  - Previously on Cardene drip yesterday, weaned off overnight last night   - Continue home medication with Coreg 25 mg twice daily  - Increase nifedipine to 60mg BID, improvement in BP since increased dose     Asymptomatic bacteriuria, POA  - UA on admission with bacteriuria +1 but no pyuria or leukocyte esterase  - Patient does not have symptoms of UTI i.e. dysuria/urinary frequency/urgency  - No indication to treat her asymptomatic bacteriuria    Chronic normocytic anemia  - Hemoglobin stable around baseline    Diabetes mellitus type 2, uncontrolled  - Continue home insulin regimen  - Correction scale with hypoglycemia protocol/high-sensitivity due to her CKD    History of CVA  - With no residual    Hyperlipidemia  - Continue home statin    Obesity  - BMI 34 kg/m²  - Counseling was provided about weight loss     Code status: DNR  Prophylaxis: Heparin  Care Plan discussed with: Patient, nursing  Anticipated Disposition: 24-48h, pending improvement in RAYMOND and stability of c diff infection on oral vanc     Hospital Problems  Date Reviewed: 8/23/2022            Codes Class Noted POA    Diarrhea ICD-10-CM: R19.7  ICD-9-CM: 787.91  10/25/2022 Unknown        RAYMOND (acute kidney injury) [facility]) ICD-10-CM: N17.9  ICD-9-CM: 584.9  10/25/2022 Unknown             Review of Systems:   A comprehensive review of systems was negative except for that written in the HPI. Vital Signs:    Last 24hrs VS reviewed since prior progress note. Most recent are:  Visit Vitals  BP (!) 169/73 (BP 1 Location: Right arm, BP Patient Position: At rest)   Pulse 69   Temp 98 °F (36.7 °C)   Resp 19   Ht 5' 4\" (1.626 m)   Wt 85.4 kg (188 lb 4.4 oz)   SpO2 96%   BMI 32.32 kg/m²         Intake/Output Summary (Last 24 hours) at 10/28/2022 0841  Last data filed at 10/28/2022 0600  Gross per 24 hour   Intake 1349.17 ml   Output --   Net 1349.17 ml        Physical Examination:     I had a face to face encounter with this patient and independently examined them on 10/28/2022 as outlined below:          Constitutional:  No acute distress, cooperative, pleasant    ENT:  Oral mucosa moist, oropharynx benign. Resp:  CTA bilaterally. No wheezing/rhonchi/rales. No accessory muscle use.     CV:  Regular rhythm, normal rate, no murmurs, gallops, rubs    GI:  Soft, non distended, non tender. normoactive bowel sounds, no hepatosplenomegaly     Musculoskeletal:  No edema, warm, 2+ pulses throughout    Neurologic:  Moves all extremities. AAOx3, CN II-XII reviewed            Data Review:    Review and/or order of clinical lab test      Labs:     Recent Labs     10/27/22  0306 10/26/22  1131   WBC 2.9* 2.7*   HGB 8.8* 8.2*   HCT 28.0* 26.2*   * 110*     Recent Labs     10/28/22  0553 10/27/22  0306 10/26/22  1131    144 142   K 3.5 3.6 3.7   * 118* 115*   CO2 20* 20* 22   BUN 33* 35* 36*   CREA 4.69* 4.70* 4.81*   GLU 52* 95 140*   CA 7.5* 7.5* 7.4*   MG 1.8  --  1.4*   PHOS 2.6  --  2.6     Recent Labs     10/28/22  0553 10/26/22  1131 10/25/22  1831   ALT  --  18 23   AP  --  139* 178*   TBILI  --  0.3 0.3   TP  --  5.6* 7.7   ALB 3.1* 3.0* 3.8   GLOB  --  2.6 3.9     Recent Labs     10/26/22  1131   INR 1.1   PTP 11.6*   APTT 30.9      No results for input(s): FE, TIBC, PSAT, FERR in the last 72 hours. No results found for: FOL, RBCF   No results for input(s): PH, PCO2, PO2 in the last 72 hours. No results for input(s): CPK, CKNDX, TROIQ in the last 72 hours.     No lab exists for component: CPKMB  No results found for: CHOL, CHOLX, CHLST, CHOLV, HDL, HDLP, LDL, LDLC, DLDLP, TGLX, TRIGL, TRIGP, CHHD, CHHDX  Lab Results   Component Value Date/Time    Glucose (POC) 86 10/28/2022 07:54 AM    Glucose (POC) 48 (LL) 10/28/2022 07:25 AM    Glucose (POC) 120 (H) 10/27/2022 09:31 PM    Glucose (POC) 135 (H) 10/27/2022 06:01 PM    Glucose (POC) 83 10/27/2022 11:56 AM     Lab Results   Component Value Date/Time    Color YELLOW/STRAW 10/26/2022 05:30 AM    Appearance CLEAR 10/26/2022 05:30 AM    Specific gravity 1.011 10/26/2022 05:30 AM    Specific gravity 1.025 04/19/2013 11:30 AM    pH (UA) 6.0 10/26/2022 05:30 AM    Protein 300 (A) 10/26/2022 05:30 AM    Glucose Negative 10/26/2022 05:30 AM    Ketone Negative 10/26/2022 05:30 AM    Bilirubin Negative 10/26/2022 05:30 AM    Urobilinogen 0.2 10/26/2022 05:30 AM    Nitrites Negative 10/26/2022 05:30 AM    Leukocyte Esterase Negative 10/26/2022 05:30 AM    Epithelial cells FEW 10/26/2022 05:30 AM    Bacteria 1+ (A) 10/26/2022 05:30 AM    WBC 0-4 10/26/2022 05:30 AM    RBC 0-5 10/26/2022 05:30 AM         Medications Reviewed:     Current Facility-Administered Medications   Medication Dose Route Frequency    melatonin tablet 3 mg  3 mg Oral QHS PRN    sodium bicarbonate tablet 650 mg  650 mg Oral TID    zolpidem (AMBIEN) tablet 5 mg  5 mg Oral QHS PRN    acetaminophen (TYLENOL) tablet 650 mg  650 mg Oral Q6H PRN    NIFEdipine ER (PROCARDIA XL) tablet 30 mg  30 mg Oral BID    ondansetron (ZOFRAN) injection 4 mg  4 mg IntraVENous Q6H PRN    hydrALAZINE (APRESOLINE) 20 mg/mL injection 10 mg  10 mg IntraVENous Q6H PRN    glucose chewable tablet 16 g  4 Tablet Oral PRN    glucagon (GLUCAGEN) injection 1 mg  1 mg IntraMUSCular PRN    dextrose 10 % infusion 0-250 mL  0-250 mL IntraVENous PRN    insulin lispro (HUMALOG) injection   SubCUTAneous AC&HS    vancomycin (FIRVANQ) 50 mg/mL oral solution 125 mg  125 mg Oral Q6H    0.9% sodium chloride infusion  50 mL/hr IntraVENous CONTINUOUS    atorvastatin (LIPITOR) tablet 40 mg  40 mg Oral DAILY    carvediloL (COREG) tablet 25 mg  25 mg Oral BID WITH MEALS    insulin glargine (LANTUS) injection 26 Units  26 Units SubCUTAneous QHS     ______________________________________________________________________  EXPECTED LENGTH OF STAY: 2d 4h  ACTUAL LENGTH OF STAY:          3                 Linsey Guzman MD

## 2022-10-29 LAB
ALBUMIN SERPL-MCNC: 3 G/DL (ref 3.5–5)
ANION GAP SERPL CALC-SCNC: 7 MMOL/L (ref 5–15)
BUN SERPL-MCNC: 34 MG/DL (ref 6–20)
BUN/CREAT SERPL: 7 (ref 12–20)
CALCIUM SERPL-MCNC: 7.5 MG/DL (ref 8.5–10.1)
CHLORIDE SERPL-SCNC: 116 MMOL/L (ref 97–108)
CO2 SERPL-SCNC: 20 MMOL/L (ref 21–32)
CREAT SERPL-MCNC: 4.78 MG/DL (ref 0.55–1.02)
GLUCOSE BLD STRIP.AUTO-MCNC: 100 MG/DL (ref 65–117)
GLUCOSE BLD STRIP.AUTO-MCNC: 80 MG/DL (ref 65–117)
GLUCOSE BLD STRIP.AUTO-MCNC: 89 MG/DL (ref 65–117)
GLUCOSE BLD STRIP.AUTO-MCNC: 94 MG/DL (ref 65–117)
GLUCOSE SERPL-MCNC: 61 MG/DL (ref 65–100)
MAGNESIUM SERPL-MCNC: 2.3 MG/DL (ref 1.6–2.4)
PHOSPHATE SERPL-MCNC: 2.6 MG/DL (ref 2.6–4.7)
POTASSIUM SERPL-SCNC: 3.5 MMOL/L (ref 3.5–5.1)
SERVICE CMNT-IMP: NORMAL
SODIUM SERPL-SCNC: 143 MMOL/L (ref 136–145)

## 2022-10-29 PROCEDURE — 74011250637 HC RX REV CODE- 250/637: Performed by: STUDENT IN AN ORGANIZED HEALTH CARE EDUCATION/TRAINING PROGRAM

## 2022-10-29 PROCEDURE — 74011250637 HC RX REV CODE- 250/637: Performed by: NURSE PRACTITIONER

## 2022-10-29 PROCEDURE — 83735 ASSAY OF MAGNESIUM: CPT

## 2022-10-29 PROCEDURE — 74011250637 HC RX REV CODE- 250/637: Performed by: INTERNAL MEDICINE

## 2022-10-29 PROCEDURE — 65270000046 HC RM TELEMETRY

## 2022-10-29 PROCEDURE — 82962 GLUCOSE BLOOD TEST: CPT

## 2022-10-29 PROCEDURE — 74011636637 HC RX REV CODE- 636/637: Performed by: STUDENT IN AN ORGANIZED HEALTH CARE EDUCATION/TRAINING PROGRAM

## 2022-10-29 PROCEDURE — 36415 COLL VENOUS BLD VENIPUNCTURE: CPT

## 2022-10-29 PROCEDURE — 74011250637 HC RX REV CODE- 250/637: Performed by: FAMILY MEDICINE

## 2022-10-29 PROCEDURE — 80069 RENAL FUNCTION PANEL: CPT

## 2022-10-29 RX ORDER — SODIUM BICARBONATE 650 MG/1
1300 TABLET ORAL 2 TIMES DAILY
Status: DISCONTINUED | OUTPATIENT
Start: 2022-10-29 | End: 2022-11-01

## 2022-10-29 RX ORDER — INSULIN GLARGINE 100 [IU]/ML
16 INJECTION, SOLUTION SUBCUTANEOUS
Status: DISCONTINUED | OUTPATIENT
Start: 2022-10-29 | End: 2022-10-31

## 2022-10-29 RX ORDER — POTASSIUM CHLORIDE 750 MG/1
10 TABLET, FILM COATED, EXTENDED RELEASE ORAL
Status: COMPLETED | OUTPATIENT
Start: 2022-10-29 | End: 2022-10-29

## 2022-10-29 RX ADMIN — ZOLPIDEM TARTRATE 5 MG: 5 TABLET ORAL at 22:22

## 2022-10-29 RX ADMIN — CARVEDILOL 25 MG: 12.5 TABLET, FILM COATED ORAL at 17:19

## 2022-10-29 RX ADMIN — SODIUM BICARBONATE 650 MG: 650 TABLET ORAL at 08:48

## 2022-10-29 RX ADMIN — NIFEDIPINE 60 MG: 60 TABLET, EXTENDED RELEASE ORAL at 17:19

## 2022-10-29 RX ADMIN — ACETAMINOPHEN 650 MG: 325 TABLET ORAL at 12:45

## 2022-10-29 RX ADMIN — Medication 125 MG: at 11:44

## 2022-10-29 RX ADMIN — ATORVASTATIN CALCIUM 40 MG: 40 TABLET, FILM COATED ORAL at 08:48

## 2022-10-29 RX ADMIN — INSULIN GLARGINE 16 UNITS: 100 INJECTION, SOLUTION SUBCUTANEOUS at 22:33

## 2022-10-29 RX ADMIN — NIFEDIPINE 60 MG: 60 TABLET, EXTENDED RELEASE ORAL at 08:48

## 2022-10-29 RX ADMIN — CARVEDILOL 25 MG: 12.5 TABLET, FILM COATED ORAL at 08:48

## 2022-10-29 RX ADMIN — POTASSIUM CHLORIDE 10 MEQ: 750 TABLET, FILM COATED, EXTENDED RELEASE ORAL at 11:48

## 2022-10-29 RX ADMIN — Medication 125 MG: at 17:19

## 2022-10-29 RX ADMIN — Medication 125 MG: at 06:19

## 2022-10-29 RX ADMIN — SODIUM BICARBONATE 1300 MG: 650 TABLET ORAL at 17:19

## 2022-10-29 NOTE — PROGRESS NOTES
0800: Bedside and Verbal shift change report given to [address] (oncoming nurse) by Glenis Gaitan RN (offgoing nurse). Report included the following information SBAR, Kardex, ED Summary, Intake/Output, MAR, Recent Results, and Cardiac Rhythm NSR . Problem: Falls - Risk of  Goal: *Absence of Falls  Description: Document [place] Fall Risk and appropriate interventions in the flowsheet. Outcome: Progressing Towards Goal  Note: Fall Risk Interventions:   Medication Interventions: Teach patient to arise slowly, Patient to call before getting OOB, Bed/chair exit alarm    Elimination Interventions: Bed/chair exit alarm     Problem: Patient Education: Go to Patient Education Activity  Goal: Patient/Family Education  Outcome: Progressing Towards Goal     Problem: Risk for Spread of Infection  Goal: Prevent transmission of infectious organism to others  Description: Prevent the transmission of infectious organisms to other patients, staff members, and visitors.   Outcome: Progressing Towards Goal     Problem: Patient Education:  Go to Education Activity  Goal: Patient/Family Education  Outcome: Progressing Towards Goal     Problem: Hypertension  Goal: *Blood pressure within specified parameters  Outcome: Progressing Towards Goal  Goal: *Fluid volume balance  Outcome: Progressing Towards Goal  Goal: *Labs within defined limits  Outcome: Progressing Towards Goal     Problem: Patient Education: Go to Patient Education Activity  Goal: Patient/Family Education  Outcome: Progressing Towards Goal     Problem: Diarrhea (Adult and Pediatrics)  Goal: *Absence of diarrhea  Outcome: Progressing Towards Goal  Goal: *PALLIATIVE CARE:  Absence of diarrhea  Outcome: Progressing Towards Goal     Problem: Patient Education: Go to Patient Education Activity  Goal: Patient/Family Education  Outcome: Progressing Towards Goal

## 2022-10-29 NOTE — PROGRESS NOTES
[address] [facility] Adult  Hospitalist Group                                                                                          Hospitalist Progress Note  Collins Subramanian MD  Answering service: [phone] OR [phone] from in house phone        Date of Service:  10/29/2022  NAME:  Hector Goss  :  1960  MRN:  [id number]      Admission Summary:   The patient is 65years old woman with past medical history significant for chronic kidney disease, diabetes mellitus type 2, GERD, hypertension, hyperlipidemia, right vision loss presented emergency department with a chief complaint of diarrhea and poor appetite. Patient was found to have RAYMOND superimposed on CKD. Interval history / Subjective:   Evaluated patient on morning rounds. Only one semi-solid BM today. No abdominal pain. Appetite is good. Feeling improved. Plans to go to sister-in-law's home after discharge. Hoping to be approaching discharge in the next day or two.         Assessment & Plan:        RAYMOND superimposed on CKD stage III, improving   - Presented with creatinine 5.33 and BUN 42  - Likely prerenal from dehydration induced by diarrhea  - CT abdomen was ordered on admission with no hydronephrosis or obstruction  - Nephrology consulted, appreciate recommendations   - Improved on IVFs, will hold tonight given only one semi-solid BM today     Acute diarrhea secondary to C Difficile Infection, POA  - CT abdomen with no evidence of colitis  - Stool studies positive for acute C Diff infection, started on oral vancomycin 10/26  -- Only one semi-solid BM today, will hold fluids tonight  -- May be approaching discharge     Hypertensive emergency, POA, improving   - Presented with systolic blood pressure more than 180 mmHg  - Previously on Cardene drip, weaned off   - Continue home medication with Coreg 25 mg twice daily  -- BP improved on Nifedepine 60mg BID     Asymptomatic bacteriuria, POA  - UA on admission with bacteriuria +1 but no pyuria or leukocyte esterase  - Patient does not have symptoms of UTI i.e. dysuria/urinary frequency/urgency  - No indication to treat her asymptomatic bacteriuria    Chronic normocytic anemia  - Hemoglobin stable around baseline    Diabetes mellitus type 2, uncontrolled  - Continue home insulin regimen  - Correction scale with hypoglycemia protocol/high-sensitivity due to her CKD    History of CVA  - With no residual    Hyperlipidemia  - Continue home statin    Obesity  - BMI 34 kg/m²  - Counseling was provided about weight loss     Code status: DNR  Prophylaxis: Heparin  Care Plan discussed with: Patient, nursing  Anticipated Disposition: 24-48h, pending improvement in RAYMOND and stability of c diff infection on oral vanc     Hospital Problems  Date Reviewed: 8/23/2022            Codes Class Noted POA    Diarrhea ICD-10-CM: R19.7  ICD-9-CM: 787.91  10/25/2022 Unknown        RAYMOND (acute kidney injury) [facility]) ICD-10-CM: N17.9  ICD-9-CM: 584.9  10/25/2022 Unknown             Review of Systems:   A comprehensive review of systems was negative except for that written in the HPI. Vital Signs:    Last 24hrs VS reviewed since prior progress note. Most recent are:  Visit Vitals  /78 (BP 1 Location: Right arm, BP Patient Position: At rest)   Pulse 73   Temp 98.8 °F (37.1 °C)   Resp 17   Ht 5' 4\" (1.626 m)   Wt 84.4 kg (186 lb)   SpO2 97%   BMI 31.93 kg/m²       No intake or output data in the 24 hours ending 10/29/22 1816       Physical Examination:     I had a face to face encounter with this patient and independently examined them on 10/29/2022 as outlined below:          Constitutional:  No acute distress, cooperative, pleasant    ENT:  Oral mucosa moist, oropharynx benign. Resp:  CTA bilaterally. No wheezing/rhonchi/rales. No accessory muscle use. CV:  Regular rhythm, normal rate, no murmurs, gallops, rubs    GI:  Soft, non distended, non tender.  normoactive bowel sounds, no hepatosplenomegaly Musculoskeletal:  No edema, warm, 2+ pulses throughout    Neurologic:  Moves all extremities. AAOx3, CN II-XII reviewed            Data Review:    Review and/or order of clinical lab test      Labs:     Recent Labs     10/27/22  0306   WBC 2.9*   HGB 8.8*   HCT 28.0*   *     Recent Labs     10/29/22  0414 10/28/22  0553 10/27/22  0306    145 144   K 3.5 3.5 3.6   * 117* 118*   CO2 20* 20* 20*   BUN 34* 33* 35*   CREA 4.78* 4.69* 4.70*   GLU 61* 52* 95   CA 7.5* 7.5* 7.5*   MG 2.3 1.8  --    PHOS 2.6 2.6  --      Recent Labs     10/29/22  0414 10/28/22  0553   ALB 3.0* 3.1*     No results for input(s): INR, PTP, APTT, INREXT, INREXT in the last 72 hours. No results for input(s): FE, TIBC, PSAT, FERR in the last 72 hours. No results found for: FOL, RBCF   No results for input(s): PH, PCO2, PO2 in the last 72 hours. No results for input(s): CPK, CKNDX, TROIQ in the last 72 hours.     No lab exists for component: CPKMB  No results found for: CHOL, CHOLX, CHLST, CHOLV, HDL, HDLP, LDL, LDLC, DLDLP, TGLX, TRIGL, TRIGP, CHHD, CHHDX  Lab Results   Component Value Date/Time    Glucose (POC) 100 10/29/2022 04:41 PM    Glucose (POC) 89 10/29/2022 11:08 AM    Glucose (POC) 80 10/29/2022 07:41 AM    Glucose (POC) 118 (H) 10/28/2022 09:14 PM    Glucose (POC) 102 10/28/2022 05:20 PM     Lab Results   Component Value Date/Time    Color YELLOW/STRAW 10/26/2022 05:30 AM    Appearance CLEAR 10/26/2022 05:30 AM    Specific gravity 1.011 10/26/2022 05:30 AM    Specific gravity 1.025 04/19/2013 11:30 AM    pH (UA) 6.0 10/26/2022 05:30 AM    Protein 300 (A) 10/26/2022 05:30 AM    Glucose Negative 10/26/2022 05:30 AM    Ketone Negative 10/26/2022 05:30 AM    Bilirubin Negative 10/26/2022 05:30 AM    Urobilinogen 0.2 10/26/2022 05:30 AM    Nitrites Negative 10/26/2022 05:30 AM    Leukocyte Esterase Negative 10/26/2022 05:30 AM    Epithelial cells FEW 10/26/2022 05:30 AM    Bacteria 1+ (A) 10/26/2022 05:30 AM    WBC 0-4 10/26/2022 05:30 AM    RBC 0-5 10/26/2022 05:30 AM         Medications Reviewed:     Current Facility-Administered Medications   Medication Dose Route Frequency    insulin glargine (LANTUS) injection 16 Units  16 Units SubCUTAneous QHS    sodium bicarbonate tablet 1,300 mg  1,300 mg Oral BID    NIFEdipine ER (PROCARDIA XL) tablet 60 mg  60 mg Oral BID    lactated Ringers infusion  75 mL/hr IntraVENous CONTINUOUS    melatonin tablet 3 mg  3 mg Oral QHS PRN    zolpidem (AMBIEN) tablet 5 mg  5 mg Oral QHS PRN    acetaminophen (TYLENOL) tablet 650 mg  650 mg Oral Q6H PRN    ondansetron (ZOFRAN) injection 4 mg  4 mg IntraVENous Q6H PRN    hydrALAZINE (APRESOLINE) 20 mg/mL injection 10 mg  10 mg IntraVENous Q6H PRN    glucose chewable tablet 16 g  4 Tablet Oral PRN    glucagon (GLUCAGEN) injection 1 mg  1 mg IntraMUSCular PRN    dextrose 10 % infusion 0-250 mL  0-250 mL IntraVENous PRN    insulin lispro (HUMALOG) injection   SubCUTAneous AC&HS    vancomycin (FIRVANQ) 50 mg/mL oral solution 125 mg  125 mg Oral Q6H    atorvastatin (LIPITOR) tablet 40 mg  40 mg Oral DAILY    carvediloL (COREG) tablet 25 mg  25 mg Oral BID WITH MEALS     ______________________________________________________________________  EXPECTED LENGTH OF STAY: 3d 2h  ACTUAL LENGTH OF STAY:          4                 Lynn Fierro MD

## 2022-10-29 NOTE — PROGRESS NOTES
Renal Progress Note    NAME:  Shae Freed   :   1960   MRN:   [id number]     Date/Time:  10/29/2022 10:23 AM      Assessment:     Acute Kidney Injury --> sec to vol depletion with GI losses - improving-cr at baseline  Na was increasing with low bicarb  CKD (Stage 4/Stage 5) -> 10/17/22 --> BUN 34 , Creat 4.88  C Diff diarrhea  Met Acidosis - sec to CKD and partly diarrhea  Hypomagnesemia  HTN-uncontrolled  DM2       Plan:     GFR has improved with hydration. Cr at baseline  Change IVF to LR at 75 mls/hour, DC when diarrhea improved and adequate po intake  Adjusted Na Bicarb to 1300 mg bid  Replete Mg prn  Encourage oral intake. Avoid NSAIDs + IV contrast.  Dose meds for GFR.   BP control         Subjective:     10/28 feeling ok, still has diarrhea, C diff+ on po vanco, cr at baseline, Na higher-hypoglycemic this am  10/29 less diarrhea, denies N/V, no SOB      Review of Systems:  Y  N       Y  N  []         []          Fever/chills                                               []         []          Chest Pain  []         []          Cough                                                       []         []          Diarrhea   []         []          Sputum                                                     []         []          Constipation  []         []          SOB/WALLACE                                                []         []          Nausea/Vomit  []         []          Abd Pain                                                    []         []          Tolerating PT  []         []          Dysuria                                                      []         []          Tolerating Diet     []        Unable to obtain  ROS due to  []        mental status change  []        sedated   []        intubated    Medications reviewed:  Current Facility-Administered Medications   Medication Dose Route Frequency    insulin glargine (LANTUS) injection 16 Units  16 Units SubCUTAneous QHS NIFEdipine ER (PROCARDIA XL) tablet 60 mg  60 mg Oral BID    lactated Ringers infusion  50 mL/hr IntraVENous CONTINUOUS    melatonin tablet 3 mg  3 mg Oral QHS PRN    sodium bicarbonate tablet 650 mg  650 mg Oral TID    zolpidem (AMBIEN) tablet 5 mg  5 mg Oral QHS PRN    acetaminophen (TYLENOL) tablet 650 mg  650 mg Oral Q6H PRN    ondansetron (ZOFRAN) injection 4 mg  4 mg IntraVENous Q6H PRN    hydrALAZINE (APRESOLINE) 20 mg/mL injection 10 mg  10 mg IntraVENous Q6H PRN    glucose chewable tablet 16 g  4 Tablet Oral PRN    glucagon (GLUCAGEN) injection 1 mg  1 mg IntraMUSCular PRN    dextrose 10 % infusion 0-250 mL  0-250 mL IntraVENous PRN    insulin lispro (HUMALOG) injection   SubCUTAneous AC&HS    vancomycin (FIRVANQ) 50 mg/mL oral solution 125 mg  125 mg Oral Q6H    atorvastatin (LIPITOR) tablet 40 mg  40 mg Oral DAILY    carvediloL (COREG) tablet 25 mg  25 mg Oral BID WITH MEALS        Objective:   Vitals:  Visit Vitals  BP (!) 174/55 (BP 1 Location: Left upper arm, BP Patient Position: At rest)   Pulse 77   Temp 99.2 °F (37.3 °C)   Resp 20   Ht 5' 4\" (1.626 m)   Wt 84.4 kg (186 lb)   LMP 10/07/2012   SpO2 94%   BMI 31.93 kg/m²     Temp (24hrs), Av.1 °F (37.3 °C), Min:98.9 °F (37.2 °C), Max:99.4 °F (37.4 °C)      O2 Device: None (Room air)    Last 24hr Input/Output:    Intake/Output Summary (Last 24 hours) at 10/29/2022 1002  Last data filed at 10/28/2022 1033  Gross per 24 hour   Intake --   Output 1 ml   Net -1 ml          PHYSICAL EXAM:      General:    Comfortable. Eyes:   No icterus    Lungs:   Clear to auscultation , No rales. Heart:   RRR, No S 3 gallop, no pericardial rub  . Abdomen:   Not distended. NT    Extremities: No significant leg oedema    Psych:  Calm    Neurologic: Responding appropriately.         []        Telemetry Reviewed     []        NSR []        PAC/PVCs   []        Afib  []        Paced   []        NSVT   []        Hough []        NGT  []        Intubated on vent    Lab Data Reviewed:    Recent Results (from the past 24 hour(s))   GLUCOSE, POC    Collection Time: 10/28/22 10:49 AM   Result Value Ref Range    Glucose (POC) 156 (H) 65 - 117 mg/dL    Performed by Koffi mortensen RN    GLUCOSE, POC    Collection Time: 10/28/22 12:10 PM   Result Value Ref Range    Glucose (POC) 146 (H) 65 - 117 mg/dL    Performed by Paco Peraza, POC    Collection Time: 10/28/22  5:20 PM   Result Value Ref Range    Glucose (POC) 102 65 - 117 mg/dL    Performed by Koffi mortensen RN    GLUCOSE, POC    Collection Time: 10/28/22  9:14 PM   Result Value Ref Range    Glucose (POC) 118 (H) 65 - 117 mg/dL    Performed by Doris Gil    RENAL FUNCTION PANEL    Collection Time: 10/29/22  4:14 AM   Result Value Ref Range    Sodium 143 136 - 145 mmol/L    Potassium 3.5 3.5 - 5.1 mmol/L    Chloride 116 (H) 97 - 108 mmol/L    CO2 20 (L) 21 - 32 mmol/L    Anion gap 7 5 - 15 mmol/L    Glucose 61 (L) 65 - 100 mg/dL    BUN 34 (H) 6 - 20 MG/DL    Creatinine 4.78 (H) 0.55 - 1.02 MG/DL    BUN/Creatinine ratio 7 (L) 12 - 20      eGFR 10 (L) >60 ml/min/1.73m2    Calcium 7.5 (L) 8.5 - 10.1 MG/DL    Phosphorus 2.6 2.6 - 4.7 MG/DL    Albumin 3.0 (L) 3.5 - 5.0 g/dL   MAGNESIUM    Collection Time: 10/29/22  4:14 AM   Result Value Ref Range    Magnesium 2.3 1.6 - 2.4 mg/dL   GLUCOSE, POC    Collection Time: 10/29/22  7:41 AM   Result Value Ref Range    Glucose (POC) 80 65 - 117 mg/dL    Performed by ANN Proctor        Total time spent with patient:  []        15   []        25   []        35   []         __ minutes    []        Critical Care Provided    Care Plan discussed with:    [x]        Patient   []        Family    []        Care Manager   []        Consultant/Specialist :      []          >50% of visit spent in counseling and coordination of care   (Discussed []        CODE status,  []        Care Plan, []        D/C Planning)    ___________________________________________________    Attending Physician: Josias Rangel MD

## 2022-10-29 NOTE — PROGRESS NOTES
Problem: Falls - Risk of  Goal: *Absence of Falls  Description: Document Brianne Counts Fall Risk and appropriate interventions in the flowsheet. Outcome: Progressing Towards Goal  Note: Fall Risk Interventions:  Mobility Interventions: Bed/chair exit alarm, Patient to call before getting OOB, Utilize walker, cane, or other assistive device         Medication Interventions: Assess postural VS orthostatic hypotension, Bed/chair exit alarm, Patient to call before getting OOB, Teach patient to arise slowly    Elimination Interventions: Bed/chair exit alarm, Call light in reach, Toileting schedule/hourly rounds              Problem: Patient Education: Go to Patient Education Activity  Goal: Patient/Family Education  Outcome: Progressing Towards Goal     Problem: Risk for Spread of Infection  Goal: Prevent transmission of infectious organism to others  Description: Prevent the transmission of infectious organisms to other patients, staff members, and visitors.   Outcome: Progressing Towards Goal     Problem: Patient Education:  Go to Education Activity  Goal: Patient/Family Education  Outcome: Progressing Towards Goal     Problem: Hypertension  Goal: *Blood pressure within specified parameters  Outcome: Progressing Towards Goal     Problem: Diarrhea (Adult and Pediatrics)  Goal: *Absence of diarrhea  Outcome: Progressing Towards Goal

## 2022-10-30 ENCOUNTER — APPOINTMENT (OUTPATIENT)
Dept: GENERAL RADIOLOGY | Age: 62
DRG: 469 | End: 2022-10-30
Attending: STUDENT IN AN ORGANIZED HEALTH CARE EDUCATION/TRAINING PROGRAM
Payer: MEDICAID

## 2022-10-30 LAB
ALBUMIN SERPL-MCNC: 3.1 G/DL (ref 3.5–5)
ANION GAP SERPL CALC-SCNC: 7 MMOL/L (ref 5–15)
BASOPHILS # BLD: 0 K/UL (ref 0–0.1)
BASOPHILS NFR BLD: 0 % (ref 0–1)
BUN SERPL-MCNC: 29 MG/DL (ref 6–20)
BUN/CREAT SERPL: 6 (ref 12–20)
CALCIUM SERPL-MCNC: 7.4 MG/DL (ref 8.5–10.1)
CHLORIDE SERPL-SCNC: 115 MMOL/L (ref 97–108)
CO2 SERPL-SCNC: 20 MMOL/L (ref 21–32)
CREAT SERPL-MCNC: 4.94 MG/DL (ref 0.55–1.02)
DIFFERENTIAL METHOD BLD: ABNORMAL
EOSINOPHIL # BLD: 0 K/UL (ref 0–0.4)
EOSINOPHIL NFR BLD: 0 % (ref 0–7)
ERYTHROCYTE [DISTWIDTH] IN BLOOD BY AUTOMATED COUNT: 14.8 % (ref 11.5–14.5)
GLUCOSE BLD STRIP.AUTO-MCNC: 84 MG/DL (ref 65–117)
GLUCOSE BLD STRIP.AUTO-MCNC: 93 MG/DL (ref 65–117)
GLUCOSE BLD STRIP.AUTO-MCNC: 93 MG/DL (ref 65–117)
GLUCOSE BLD STRIP.AUTO-MCNC: 99 MG/DL (ref 65–117)
GLUCOSE SERPL-MCNC: 83 MG/DL (ref 65–100)
HCT VFR BLD AUTO: 27.1 % (ref 35–47)
HGB BLD-MCNC: 8.4 G/DL (ref 11.5–16)
IMM GRANULOCYTES # BLD AUTO: 0 K/UL (ref 0–0.04)
IMM GRANULOCYTES NFR BLD AUTO: 0 % (ref 0–0.5)
LYMPHOCYTES # BLD: 0.4 K/UL (ref 0.8–3.5)
LYMPHOCYTES NFR BLD: 11 % (ref 12–49)
MCH RBC QN AUTO: 26.8 PG (ref 26–34)
MCHC RBC AUTO-ENTMCNC: 31 G/DL (ref 30–36.5)
MCV RBC AUTO: 86.6 FL (ref 80–99)
MONOCYTES # BLD: 0.2 K/UL (ref 0–1)
MONOCYTES NFR BLD: 5 % (ref 5–13)
NEUTS SEG # BLD: 3.4 K/UL (ref 1.8–8)
NEUTS SEG NFR BLD: 84 % (ref 32–75)
NRBC # BLD: 0 K/UL (ref 0–0.01)
NRBC BLD-RTO: 0 PER 100 WBC
PHOSPHATE SERPL-MCNC: 2.3 MG/DL (ref 2.6–4.7)
PLATELET # BLD AUTO: 101 K/UL (ref 150–400)
PMV BLD AUTO: 12.6 FL (ref 8.9–12.9)
POTASSIUM SERPL-SCNC: 3.7 MMOL/L (ref 3.5–5.1)
RBC # BLD AUTO: 3.13 M/UL (ref 3.8–5.2)
RBC MORPH BLD: ABNORMAL
SERVICE CMNT-IMP: NORMAL
SODIUM SERPL-SCNC: 142 MMOL/L (ref 136–145)
WBC # BLD AUTO: 4 K/UL (ref 3.6–11)

## 2022-10-30 PROCEDURE — 74011250637 HC RX REV CODE- 250/637: Performed by: NURSE PRACTITIONER

## 2022-10-30 PROCEDURE — 74011250637 HC RX REV CODE- 250/637: Performed by: INTERNAL MEDICINE

## 2022-10-30 PROCEDURE — 74011636637 HC RX REV CODE- 636/637: Performed by: STUDENT IN AN ORGANIZED HEALTH CARE EDUCATION/TRAINING PROGRAM

## 2022-10-30 PROCEDURE — 74011250637 HC RX REV CODE- 250/637: Performed by: STUDENT IN AN ORGANIZED HEALTH CARE EDUCATION/TRAINING PROGRAM

## 2022-10-30 PROCEDURE — 82962 GLUCOSE BLOOD TEST: CPT

## 2022-10-30 PROCEDURE — 80069 RENAL FUNCTION PANEL: CPT

## 2022-10-30 PROCEDURE — 85025 COMPLETE CBC W/AUTO DIFF WBC: CPT

## 2022-10-30 PROCEDURE — 71045 X-RAY EXAM CHEST 1 VIEW: CPT

## 2022-10-30 PROCEDURE — 74011250637 HC RX REV CODE- 250/637: Performed by: FAMILY MEDICINE

## 2022-10-30 PROCEDURE — 74011000250 HC RX REV CODE- 250: Performed by: STUDENT IN AN ORGANIZED HEALTH CARE EDUCATION/TRAINING PROGRAM

## 2022-10-30 PROCEDURE — 65270000046 HC RM TELEMETRY

## 2022-10-30 PROCEDURE — 36415 COLL VENOUS BLD VENIPUNCTURE: CPT

## 2022-10-30 RX ORDER — BUMETANIDE 0.25 MG/ML
2 INJECTION INTRAMUSCULAR; INTRAVENOUS ONCE
Status: COMPLETED | OUTPATIENT
Start: 2022-10-30 | End: 2022-10-30

## 2022-10-30 RX ORDER — DOXYCYCLINE HYCLATE 100 MG
100 TABLET ORAL EVERY 12 HOURS
Status: DISCONTINUED | OUTPATIENT
Start: 2022-10-30 | End: 2022-11-01

## 2022-10-30 RX ADMIN — SODIUM BICARBONATE 1300 MG: 650 TABLET ORAL at 17:56

## 2022-10-30 RX ADMIN — ZOLPIDEM TARTRATE 5 MG: 5 TABLET ORAL at 22:07

## 2022-10-30 RX ADMIN — NIFEDIPINE 60 MG: 60 TABLET, EXTENDED RELEASE ORAL at 17:56

## 2022-10-30 RX ADMIN — INSULIN GLARGINE 16 UNITS: 100 INJECTION, SOLUTION SUBCUTANEOUS at 22:05

## 2022-10-30 RX ADMIN — DOXYCYCLINE HYCLATE 100 MG: 100 TABLET, COATED ORAL at 14:39

## 2022-10-30 RX ADMIN — DIBASIC SODIUM PHOSPHATE, MONOBASIC POTASSIUM PHOSPHATE AND MONOBASIC SODIUM PHOSPHATE 2 TABLET: 852; 155; 130 TABLET ORAL at 08:38

## 2022-10-30 RX ADMIN — Medication 125 MG: at 13:02

## 2022-10-30 RX ADMIN — Medication 125 MG: at 06:26

## 2022-10-30 RX ADMIN — BUMETANIDE 2 MG: 0.25 INJECTION INTRAMUSCULAR; INTRAVENOUS at 14:39

## 2022-10-30 RX ADMIN — Medication 125 MG: at 17:57

## 2022-10-30 RX ADMIN — DOXYCYCLINE HYCLATE 100 MG: 100 TABLET, COATED ORAL at 22:05

## 2022-10-30 RX ADMIN — NIFEDIPINE 60 MG: 60 TABLET, EXTENDED RELEASE ORAL at 08:38

## 2022-10-30 RX ADMIN — ATORVASTATIN CALCIUM 40 MG: 40 TABLET, FILM COATED ORAL at 08:38

## 2022-10-30 RX ADMIN — CARVEDILOL 25 MG: 12.5 TABLET, FILM COATED ORAL at 17:57

## 2022-10-30 RX ADMIN — CARVEDILOL 25 MG: 12.5 TABLET, FILM COATED ORAL at 07:28

## 2022-10-30 RX ADMIN — SODIUM BICARBONATE 1300 MG: 650 TABLET ORAL at 08:38

## 2022-10-30 RX ADMIN — Medication 125 MG: at 00:30

## 2022-10-30 NOTE — PROGRESS NOTES
Rn was told of new oxygen requirement during the night patient desated while sleeping. Night shift RN placed patient on 2L NC. RN assessed patient at beginning of shift. Patient's O2 sat 94% on 2L, lung sounds diminished. MD notified.

## 2022-10-30 NOTE — PROGRESS NOTES
Problem: Falls - Risk of  Goal: *Absence of Falls  Description: Document Mila Nap Fall Risk and appropriate interventions in the flowsheet. Outcome: Progressing Towards Goal  Note: Fall Risk Interventions:  Mobility Interventions: Communicate number of staff needed for ambulation/transfer         Medication Interventions: Assess postural VS orthostatic hypotension    Elimination Interventions: Bed/chair exit alarm              Problem: Risk for Spread of Infection  Goal: Prevent transmission of infectious organism to others  Description: Prevent the transmission of infectious organisms to other patients, staff members, and visitors.   Outcome: Progressing Towards Goal

## 2022-10-30 NOTE — PROGRESS NOTES
[address] [facility] Adult  Hospitalist Group                                                                                          Hospitalist Progress Note  Monik Pal MD  Answering service: [phone] OR [phone] from in house phone        Date of Service:  10/30/2022  NAME:  Johanna Andino  :  1960  MRN:  [id number]      Admission Summary:   The patient is 65years old woman with past medical history significant for chronic kidney disease, diabetes mellitus type 2, GERD, hypertension, hyperlipidemia, right vision loss presented emergency department with a chief complaint of diarrhea and poor appetite. Patient was found to have RAYMOND superimposed on CKD. Interval history / Subjective:   Overnight had desaturations and required 2L NC. Endorses wet cough today. Otherwise would like to go home. 2 soft Bms over last 24h. No abdominal pain. Appetite is good.         Assessment & Plan:        Acute hypoxia; pulm edema v pna   Requiring 2L NC this morning; coarse breath sounds   CXR concerning for pulm edema v pna  Recently on IVFs given concern for dehydration during c diff infection, IVF stopped 10/29 afternoon   Fever of 100.4  Plan:   - Bumex IV 2mg x 1 dose   - Start doxycycline for empiric pna coverage  - Oxygen supplementation, wean as tolerate  - Pulm toilet at bedisde       RAYMOND superimposed on CKD stage IV, improving   - Presented with creatinine 5.33 and BUN 42  - Likely prerenal from dehydration induced by diarrhea  - CT abdomen was ordered on admission with no hydronephrosis or obstruction  - Nephrology consulted, appreciate recommendations   - Stopped IVFs, Cr stable     Acute diarrhea secondary to C Difficile Infection, POA, improving   - CT abdomen with no evidence of colitis  - Stool studies positive for acute C Diff infection, started on oral vancomycin 10/26 (end )    Hypertensive emergency, POA, improving   - Presented with systolic blood pressure more than 180 mmHg  - Previously on Cardene drip, weaned off   - Continue home medication with Coreg 25 mg twice daily  -- BP improved on Nifedepine 60mg BID     Asymptomatic bacteriuria, POA  - UA on admission with bacteriuria +1 but no pyuria or leukocyte esterase  - Patient does not have symptoms of UTI i.e. dysuria/urinary frequency/urgency  - No indication to treat her asymptomatic bacteriuria    Chronic normocytic anemia  - Hemoglobin stable around baseline    Diabetes mellitus type 2, uncontrolled  - Continue home insulin regimen  - Correction scale with hypoglycemia protocol/high-sensitivity due to her CKD    History of CVA  - With no residual    Hyperlipidemia  - Continue home statin    Obesity  - BMI 34 kg/m²  - Counseling was provided about weight loss     Code status: DNR  Prophylaxis: Heparin  Care Plan discussed with: Patient, nursing  Anticipated Disposition: 24-48h, pending improvement in RAYMOND and stability of c diff infection on oral vanc     Hospital Problems  Date Reviewed: 8/23/2022            Codes Class Noted POA    Diarrhea ICD-10-CM: R19.7  ICD-9-CM: 787.91  10/25/2022 Unknown        RAYMOND (acute kidney injury) [facility]) ICD-10-CM: N17.9  ICD-9-CM: 584.9  10/25/2022 Unknown             Review of Systems:   A comprehensive review of systems was negative except for that written in the HPI. Vital Signs:    Last 24hrs VS reviewed since prior progress note. Most recent are:  Visit Vitals  BP (!) 145/70 (BP 1 Location: Left arm, BP Patient Position: At rest)   Pulse 63   Temp 100.4 °F (38 °C)   Resp 20   Ht 5' 4\" (1.626 m)   Wt 84.4 kg (186 lb)   SpO2 91%   BMI 31.93 kg/m²       No intake or output data in the 24 hours ending 10/30/22 1250       Physical Examination:     I had a face to face encounter with this patient and independently examined them on 10/30/2022 as outlined below:          Constitutional:  No acute distress, cooperative, pleasant    ENT:  Oral mucosa moist, oropharynx benign.     Resp:  Wet breath sounds bilaterally. No accessory muscle use. CV:  Regular rhythm, normal rate, no murmurs, gallops, rubs    GI:  Soft, non distended, non tender. normoactive bowel sounds, no hepatosplenomegaly     Musculoskeletal:  Trace edema, warm, 2+ pulses throughout    Neurologic:  Moves all extremities. AAOx3, CN II-XII reviewed            Data Review:    Review and/or order of clinical lab test      Labs:     Recent Labs     10/30/22  0647   WBC 4.0   HGB 8.4*   HCT 27.1*   *     Recent Labs     10/30/22  0647 10/29/22  0414 10/28/22  0553    143 145   K 3.7 3.5 3.5   * 116* 117*   CO2 20* 20* 20*   BUN 29* 34* 33*   CREA 4.94* 4.78* 4.69*   GLU 83 61* 52*   CA 7.4* 7.5* 7.5*   MG  --  2.3 1.8   PHOS 2.3* 2.6 2.6     Recent Labs     10/30/22  0647 10/29/22  0414 10/28/22  0553   ALB 3.1* 3.0* 3.1*     No results for input(s): INR, PTP, APTT, INREXT, INREXT in the last 72 hours. No results for input(s): FE, TIBC, PSAT, FERR in the last 72 hours. No results found for: FOL, RBCF   No results for input(s): PH, PCO2, PO2 in the last 72 hours. No results for input(s): CPK, CKNDX, TROIQ in the last 72 hours.     No lab exists for component: CPKMB  No results found for: CHOL, CHOLX, CHLST, CHOLV, HDL, HDLP, LDL, LDLC, DLDLP, TGLX, TRIGL, TRIGP, CHHD, [facility]  Lab Results   Component Value Date/Time    Glucose (POC) 93 10/30/2022 11:26 AM    Glucose (POC) 84 10/30/2022 06:29 AM    Glucose (POC) 94 10/29/2022 10:25 PM    Glucose (POC) 100 10/29/2022 04:41 PM    Glucose (POC) 89 10/29/2022 11:08 AM     Lab Results   Component Value Date/Time    Color YELLOW/STRAW 10/26/2022 05:30 AM    Appearance CLEAR 10/26/2022 05:30 AM    Specific gravity 1.011 10/26/2022 05:30 AM    Specific gravity 1.025 04/19/2013 11:30 AM    pH (UA) 6.0 10/26/2022 05:30 AM    Protein 300 (A) 10/26/2022 05:30 AM    Glucose Negative 10/26/2022 05:30 AM    Ketone Negative 10/26/2022 05:30 AM    Bilirubin Negative 10/26/2022 05:30 AM    Urobilinogen 0.2 10/26/2022 05:30 AM    Nitrites Negative 10/26/2022 05:30 AM    Leukocyte Esterase Negative 10/26/2022 05:30 AM    Epithelial cells FEW 10/26/2022 05:30 AM    Bacteria 1+ (A) 10/26/2022 05:30 AM    WBC 0-4 10/26/2022 05:30 AM    RBC 0-5 10/26/2022 05:30 AM         Medications Reviewed:     Current Facility-Administered Medications   Medication Dose Route Frequency    insulin glargine (LANTUS) injection 16 Units  16 Units SubCUTAneous QHS    sodium bicarbonate tablet 1,300 mg  1,300 mg Oral BID    NIFEdipine ER (PROCARDIA XL) tablet 60 mg  60 mg Oral BID    [Held by provider] lactated Ringers infusion  75 mL/hr IntraVENous CONTINUOUS    melatonin tablet 3 mg  3 mg Oral QHS PRN    zolpidem (AMBIEN) tablet 5 mg  5 mg Oral QHS PRN    acetaminophen (TYLENOL) tablet 650 mg  650 mg Oral Q6H PRN    ondansetron (ZOFRAN) injection 4 mg  4 mg IntraVENous Q6H PRN    hydrALAZINE (APRESOLINE) 20 mg/mL injection 10 mg  10 mg IntraVENous Q6H PRN    glucose chewable tablet 16 g  4 Tablet Oral PRN    glucagon (GLUCAGEN) injection 1 mg  1 mg IntraMUSCular PRN    dextrose 10 % infusion 0-250 mL  0-250 mL IntraVENous PRN    insulin lispro (HUMALOG) injection   SubCUTAneous AC&HS    vancomycin (FIRVANQ) 50 mg/mL oral solution 125 mg  125 mg Oral Q6H    atorvastatin (LIPITOR) tablet 40 mg  40 mg Oral DAILY    carvediloL (COREG) tablet 25 mg  25 mg Oral BID WITH MEALS     ______________________________________________________________________  EXPECTED LENGTH OF STAY: 3d 2h  ACTUAL LENGTH OF STAY:          5                 Lucy Moore MD

## 2022-10-31 LAB
ALBUMIN SERPL-MCNC: 2.8 G/DL (ref 3.5–5)
ANION GAP SERPL CALC-SCNC: 6 MMOL/L (ref 5–15)
BASOPHILS # BLD: 0 K/UL (ref 0–0.1)
BASOPHILS NFR BLD: 0 % (ref 0–1)
BNP SERPL-MCNC: ABNORMAL PG/ML
BUN SERPL-MCNC: 34 MG/DL (ref 6–20)
BUN/CREAT SERPL: 6 (ref 12–20)
CALCIUM SERPL-MCNC: 7.2 MG/DL (ref 8.5–10.1)
CHLORIDE SERPL-SCNC: 113 MMOL/L (ref 97–108)
CO2 SERPL-SCNC: 22 MMOL/L (ref 21–32)
CREAT SERPL-MCNC: 5.35 MG/DL (ref 0.55–1.02)
DIFFERENTIAL METHOD BLD: ABNORMAL
EOSINOPHIL # BLD: 0 K/UL (ref 0–0.4)
EOSINOPHIL NFR BLD: 0 % (ref 0–7)
ERYTHROCYTE [DISTWIDTH] IN BLOOD BY AUTOMATED COUNT: 14.8 % (ref 11.5–14.5)
GLUCOSE BLD STRIP.AUTO-MCNC: 119 MG/DL (ref 65–117)
GLUCOSE BLD STRIP.AUTO-MCNC: 54 MG/DL (ref 65–117)
GLUCOSE BLD STRIP.AUTO-MCNC: 68 MG/DL (ref 65–117)
GLUCOSE BLD STRIP.AUTO-MCNC: 74 MG/DL (ref 65–117)
GLUCOSE BLD STRIP.AUTO-MCNC: 84 MG/DL (ref 65–117)
GLUCOSE BLD STRIP.AUTO-MCNC: 85 MG/DL (ref 65–117)
GLUCOSE BLD STRIP.AUTO-MCNC: 85 MG/DL (ref 65–117)
GLUCOSE SERPL-MCNC: 73 MG/DL (ref 65–100)
HCT VFR BLD AUTO: 25.8 % (ref 35–47)
HGB BLD-MCNC: 7.9 G/DL (ref 11.5–16)
IMM GRANULOCYTES # BLD AUTO: 0 K/UL (ref 0–0.04)
IMM GRANULOCYTES NFR BLD AUTO: 0 % (ref 0–0.5)
LYMPHOCYTES # BLD: 0.5 K/UL (ref 0.8–3.5)
LYMPHOCYTES NFR BLD: 10 % (ref 12–49)
MCH RBC QN AUTO: 26.3 PG (ref 26–34)
MCHC RBC AUTO-ENTMCNC: 30.6 G/DL (ref 30–36.5)
MCV RBC AUTO: 86 FL (ref 80–99)
MONOCYTES # BLD: 0.2 K/UL (ref 0–1)
MONOCYTES NFR BLD: 4 % (ref 5–13)
NEUTS SEG # BLD: 4.2 K/UL (ref 1.8–8)
NEUTS SEG NFR BLD: 86 % (ref 32–75)
NRBC # BLD: 0 K/UL (ref 0–0.01)
NRBC BLD-RTO: 0 PER 100 WBC
PHOSPHATE SERPL-MCNC: 2.7 MG/DL (ref 2.6–4.7)
PLATELET # BLD AUTO: 101 K/UL (ref 150–400)
PMV BLD AUTO: 12.9 FL (ref 8.9–12.9)
POTASSIUM SERPL-SCNC: 3.6 MMOL/L (ref 3.5–5.1)
RBC # BLD AUTO: 3 M/UL (ref 3.8–5.2)
RBC MORPH BLD: ABNORMAL
SERVICE CMNT-IMP: ABNORMAL
SERVICE CMNT-IMP: ABNORMAL
SERVICE CMNT-IMP: NORMAL
SODIUM SERPL-SCNC: 141 MMOL/L (ref 136–145)
WBC # BLD AUTO: 4.9 K/UL (ref 3.6–11)

## 2022-10-31 PROCEDURE — 65270000046 HC RM TELEMETRY

## 2022-10-31 PROCEDURE — 74011250637 HC RX REV CODE- 250/637: Performed by: NURSE PRACTITIONER

## 2022-10-31 PROCEDURE — 74011000250 HC RX REV CODE- 250: Performed by: INTERNAL MEDICINE

## 2022-10-31 PROCEDURE — 74011250637 HC RX REV CODE- 250/637: Performed by: PHYSICIAN ASSISTANT

## 2022-10-31 PROCEDURE — 80069 RENAL FUNCTION PANEL: CPT

## 2022-10-31 PROCEDURE — 74011250637 HC RX REV CODE- 250/637: Performed by: INTERNAL MEDICINE

## 2022-10-31 PROCEDURE — 74011000250 HC RX REV CODE- 250: Performed by: PHYSICIAN ASSISTANT

## 2022-10-31 PROCEDURE — 36415 COLL VENOUS BLD VENIPUNCTURE: CPT

## 2022-10-31 PROCEDURE — 83880 ASSAY OF NATRIURETIC PEPTIDE: CPT

## 2022-10-31 PROCEDURE — 82962 GLUCOSE BLOOD TEST: CPT

## 2022-10-31 PROCEDURE — 74011636637 HC RX REV CODE- 636/637: Performed by: PHYSICIAN ASSISTANT

## 2022-10-31 PROCEDURE — 85025 COMPLETE CBC W/AUTO DIFF WBC: CPT

## 2022-10-31 PROCEDURE — 74011250637 HC RX REV CODE- 250/637: Performed by: FAMILY MEDICINE

## 2022-10-31 PROCEDURE — 74011250636 HC RX REV CODE- 250/636: Performed by: PHYSICIAN ASSISTANT

## 2022-10-31 PROCEDURE — 74011250637 HC RX REV CODE- 250/637: Performed by: STUDENT IN AN ORGANIZED HEALTH CARE EDUCATION/TRAINING PROGRAM

## 2022-10-31 RX ORDER — BUMETANIDE 0.25 MG/ML
1 INJECTION INTRAMUSCULAR; INTRAVENOUS ONCE
Status: COMPLETED | OUTPATIENT
Start: 2022-10-31 | End: 2022-10-31

## 2022-10-31 RX ORDER — INSULIN LISPRO 100 [IU]/ML
INJECTION, SOLUTION INTRAVENOUS; SUBCUTANEOUS
Status: DISCONTINUED | OUTPATIENT
Start: 2022-10-31 | End: 2022-11-03

## 2022-10-31 RX ORDER — INSULIN GLARGINE 100 [IU]/ML
10 INJECTION, SOLUTION SUBCUTANEOUS
Status: DISCONTINUED | OUTPATIENT
Start: 2022-10-31 | End: 2022-11-01

## 2022-10-31 RX ORDER — BUMETANIDE 1 MG/1
2 TABLET ORAL DAILY
Status: DISCONTINUED | OUTPATIENT
Start: 2022-11-01 | End: 2022-11-09 | Stop reason: HOSPADM

## 2022-10-31 RX ORDER — PROCHLORPERAZINE EDISYLATE 5 MG/ML
5 INJECTION INTRAMUSCULAR; INTRAVENOUS
Status: DISCONTINUED | OUTPATIENT
Start: 2022-10-31 | End: 2022-11-09 | Stop reason: HOSPADM

## 2022-10-31 RX ORDER — HEPARIN SODIUM 5000 [USP'U]/ML
5000 INJECTION, SOLUTION INTRAVENOUS; SUBCUTANEOUS EVERY 8 HOURS
Status: DISCONTINUED | OUTPATIENT
Start: 2022-10-31 | End: 2022-11-09 | Stop reason: HOSPADM

## 2022-10-31 RX ORDER — BUMETANIDE 0.25 MG/ML
2 INJECTION INTRAMUSCULAR; INTRAVENOUS ONCE
Status: DISCONTINUED | OUTPATIENT
Start: 2022-10-31 | End: 2022-10-31

## 2022-10-31 RX ORDER — INSULIN GLARGINE 100 [IU]/ML
12 INJECTION, SOLUTION SUBCUTANEOUS
Status: DISCONTINUED | OUTPATIENT
Start: 2022-10-31 | End: 2022-10-31

## 2022-10-31 RX ORDER — PROCHLORPERAZINE MALEATE 5 MG
5 TABLET ORAL
Status: DISCONTINUED | OUTPATIENT
Start: 2022-10-31 | End: 2022-11-09 | Stop reason: HOSPADM

## 2022-10-31 RX ADMIN — Medication 125 MG: at 07:09

## 2022-10-31 RX ADMIN — NIFEDIPINE 60 MG: 60 TABLET, EXTENDED RELEASE ORAL at 17:59

## 2022-10-31 RX ADMIN — Medication 125 MG: at 18:04

## 2022-10-31 RX ADMIN — BUMETANIDE 1 MG: 0.25 INJECTION INTRAMUSCULAR; INTRAVENOUS at 09:31

## 2022-10-31 RX ADMIN — SODIUM BICARBONATE 1300 MG: 650 TABLET ORAL at 11:30

## 2022-10-31 RX ADMIN — SODIUM CHLORIDE 1 G: 9 INJECTION INTRAMUSCULAR; INTRAVENOUS; SUBCUTANEOUS at 15:22

## 2022-10-31 RX ADMIN — BUMETANIDE 1 MG: 0.25 INJECTION INTRAMUSCULAR; INTRAVENOUS at 15:22

## 2022-10-31 RX ADMIN — ZOLPIDEM TARTRATE 5 MG: 5 TABLET ORAL at 21:57

## 2022-10-31 RX ADMIN — NIFEDIPINE 60 MG: 60 TABLET, EXTENDED RELEASE ORAL at 11:30

## 2022-10-31 RX ADMIN — INSULIN GLARGINE 10 UNITS: 100 INJECTION, SOLUTION SUBCUTANEOUS at 21:53

## 2022-10-31 RX ADMIN — CARVEDILOL 25 MG: 12.5 TABLET, FILM COATED ORAL at 07:09

## 2022-10-31 RX ADMIN — DOXYCYCLINE HYCLATE 100 MG: 100 TABLET, COATED ORAL at 11:29

## 2022-10-31 RX ADMIN — CARVEDILOL 25 MG: 12.5 TABLET, FILM COATED ORAL at 17:59

## 2022-10-31 RX ADMIN — ATORVASTATIN CALCIUM 40 MG: 40 TABLET, FILM COATED ORAL at 11:30

## 2022-10-31 RX ADMIN — HEPARIN SODIUM 5000 UNITS: 5000 INJECTION INTRAVENOUS; SUBCUTANEOUS at 17:59

## 2022-10-31 RX ADMIN — Medication 125 MG: at 11:29

## 2022-10-31 RX ADMIN — DOXYCYCLINE HYCLATE 100 MG: 100 TABLET, COATED ORAL at 21:53

## 2022-10-31 RX ADMIN — ACETAMINOPHEN 650 MG: 325 TABLET ORAL at 19:27

## 2022-10-31 RX ADMIN — Medication 125 MG: at 00:13

## 2022-10-31 RX ADMIN — SODIUM BICARBONATE 1300 MG: 650 TABLET ORAL at 17:59

## 2022-10-31 NOTE — PROGRESS NOTES
RACHAEL:  1. RUR-11%  2. Return home with family/daughter when stable. 3. Nephrology following- patient is not on HD at this time. CM spoke with attending NP, XIOMY who stated per Nephrology patient is not ready for discharge today. CM will continue to follow for any discharge needs.     Kathy Rivera [facility]

## 2022-10-31 NOTE — PROGRESS NOTES
Renal Progress Note    NAME:  Darrius Calderon   :   1960   MRN:   [id number]     Date/Time:  10/31/2022 10:23 AM      Assessment:     Acute Kidney Injury --> sec to vol depletion with GI losses - improving  CKD (Stage 4/Stage 5) -> 10/17/22 --> BUN 34 , Creat 4.88  C Diff diarrhea, no more diarrhea  Met Acidosis - sec to CKD and partly diarrhea-improved  Hypomagnesemia  HTN-uncontrolled  DM2  Acute hypoxic resp failure, due to volume overload vs PNA 10/30       Plan:     GFR has improved with hydration. Cr at baseline  Off IVF  Restart bumex po tomorrow, will give one dose IV today  Antibiotics per primary team  Adjusted Na Bicarb to 650 MG TID  Replete Mg prn  Encourage oral intake. Avoid NSAIDs + IV contrast.  Dose meds for GFR. BP control         Subjective:     10/28 feeling ok, still has diarrhea, C diff+ on po vanco, cr at baseline, Na higher-hypoglycemic this am  10/31 was hypoxic yesterday and SOB, CXR showed pulm edema vs PNA. Has productive cough, febrile. Diarrhea has stopped. Given one dose bumex and started doxy.  She feels better today, requires 2 lit O2    Review of Systems:  Y  N       Y  N  []         []          Fever/chills                                               []         []          Chest Pain  []         []          Cough                                                       []         []          Diarrhea   []         []          Sputum                                                     []         []          Constipation  []         []          SOB/WALLACE                                                []         []          Nausea/Vomit  []         []          Abd Pain                                                    []         []          Tolerating PT  []         []          Dysuria                                                      []         []          Tolerating Diet     []        Unable to obtain  ROS due to  []        mental status change  [] sedated   []        intubated    Medications reviewed:  Current Facility-Administered Medications   Medication Dose Route Frequency    insulin glargine (LANTUS) injection 12 Units  12 Units SubCUTAneous QHS    doxycycline (VIBRA-TABS) tablet 100 mg  100 mg Oral Q12H    sodium bicarbonate tablet 1,300 mg  1,300 mg Oral BID    NIFEdipine ER (PROCARDIA XL) tablet 60 mg  60 mg Oral BID    melatonin tablet 3 mg  3 mg Oral QHS PRN    zolpidem (AMBIEN) tablet 5 mg  5 mg Oral QHS PRN    acetaminophen (TYLENOL) tablet 650 mg  650 mg Oral Q6H PRN    ondansetron (ZOFRAN) injection 4 mg  4 mg IntraVENous Q6H PRN    hydrALAZINE (APRESOLINE) 20 mg/mL injection 10 mg  10 mg IntraVENous Q6H PRN    glucose chewable tablet 16 g  4 Tablet Oral PRN    glucagon (GLUCAGEN) injection 1 mg  1 mg IntraMUSCular PRN    dextrose 10 % infusion 0-250 mL  0-250 mL IntraVENous PRN    insulin lispro (HUMALOG) injection   SubCUTAneous AC&HS    vancomycin (FIRVANQ) 50 mg/mL oral solution 125 mg  125 mg Oral Q6H    atorvastatin (LIPITOR) tablet 40 mg  40 mg Oral DAILY    carvediloL (COREG) tablet 25 mg  25 mg Oral BID WITH MEALS        Objective:   Vitals:  Visit Vitals  BP (!) 145/68 (BP 1 Location: Left upper arm, BP Patient Position: At rest)   Pulse 65   Temp 98.5 °F (36.9 °C)   Resp 20   Ht 5' 4\" (1.626 m)   Wt 84.4 kg (186 lb)   LMP 10/07/2012   SpO2 90%   BMI 31.93 kg/m²     Temp (24hrs), Av.3 °F (37.4 °C), Min:98.3 °F (36.8 °C), Max:100.4 °F (38 °C)      O2 Device: None (Room air)    Last 24hr Input/Output:  No intake or output data in the 24 hours ending 10/31/22 0910       PHYSICAL EXAM:    Seen in CCU 24    General:    Comfortable. Eyes:   No icterus    Lungs:   BL crackles    Heart:   RRR, No S 3 gallop, no pericardial rub  . Abdomen:   Not distended. NT    Extremities: 1+ edema    Psych:  Calm    Neurologic: Responding appropriately.         []        Telemetry Reviewed     []        NSR []        PAC/PVCs   []        Afib  [] Paced   []        NSVT   []        Hough []        NGT  []        Intubated on vent    Lab Data Reviewed:    Recent Results (from the past 24 hour(s))   GLUCOSE, POC    Collection Time: 10/30/22 11:26 AM   Result Value Ref Range    Glucose (POC) 93 65 - 117 mg/dL    [facility]    Collection Time: 10/30/22  4:18 PM   Result Value Ref Range    Glucose (POC) 99 65 - 117 mg/dL    Performed by [address] [place] [address], POC    Collection Time: 10/30/22 10:11 PM   Result Value Ref Range    Glucose (POC) 93 65 - 117 mg/dL    Performed by Venessa Yan    RENAL FUNCTION PANEL    Collection Time: 10/31/22 12:22 AM   Result Value Ref Range    Sodium 141 136 - 145 mmol/L    Potassium 3.6 3.5 - 5.1 mmol/L    Chloride 113 (H) 97 - 108 mmol/L    CO2 22 21 - 32 mmol/L    Anion gap 6 5 - 15 mmol/L    Glucose 73 65 - 100 mg/dL    BUN 34 (H) 6 - 20 MG/DL    Creatinine 5.35 (H) 0.55 - 1.02 MG/DL    BUN/Creatinine ratio 6 (L) 12 - 20      eGFR 9 (L) >60 ml/min/1.73m2    Calcium 7.2 (L) 8.5 - 10.1 MG/DL    Phosphorus 2.7 2.6 - 4.7 MG/DL    Albumin 2.8 (L) 3.5 - 5.0 g/dL   CBC WITH AUTOMATED DIFF    Collection Time: 10/31/22 12:22 AM   Result Value Ref Range    WBC 4.9 3.6 - 11.0 K/uL    RBC 3.00 (L) 3.80 - 5.20 M/uL    HGB 7.9 (L) 11.5 - 16.0 g/dL    HCT 25.8 (L) 35.0 - 47.0 %    MCV 86.0 80.0 - 99.0 FL    MCH 26.3 26.0 - 34.0 PG    MCHC 30.6 30.0 - 36.5 g/dL    RDW 14.8 (H) 11.5 - 14.5 %    PLATELET 534 (L) 915 - 400 K/uL    MPV 12.9 8.9 - 12.9 FL    NRBC 0.0 0  WBC    ABSOLUTE NRBC 0.00 0.00 - 0.01 K/uL    NEUTROPHILS 86 (H) 32 - 75 %    LYMPHOCYTES 10 (L) 12 - 49 %    MONOCYTES 4 (L) 5 - 13 %    EOSINOPHILS 0 0 - 7 %    BASOPHILS 0 0 - 1 %    IMMATURE GRANULOCYTES 0 0.0 - 0.5 %    ABS. NEUTROPHILS 4.2 1.8 - 8.0 K/UL    ABS. LYMPHOCYTES 0.5 (L) 0.8 - 3.5 K/UL    ABS. MONOCYTES 0.2 0.0 - 1.0 K/UL    ABS. EOSINOPHILS 0.0 0.0 - 0.4 K/UL    ABS. BASOPHILS 0.0 0.0 - 0.1 K/UL    ABS. IMM. GRANS. 0.0 0.00 - 0.04 K/UL    DF SMEAR SCANNED      RBC COMMENTS GHISLAINE CELLS  PRESENT       GLUCOSE, POC    Collection Time: 10/31/22  6:42 AM   Result Value Ref Range    Glucose (POC) 54 (L) 65 - 117 mg/dL    Performed by [facility]    GLUCOSE, POC    Collection Time: 10/31/22  6:57 AM   Result Value Ref Range    Glucose (POC) 74 65 - 117 mg/dL    Performed by [address], POC    Collection Time: 10/31/22  7:14 AM   Result Value Ref Range    Glucose (POC) 85 65 - 117 mg/dL    Performed by Mercedez Abad        Total time spent with patient:  []        15   []        25   []        35   []         __ minutes    []        Critical Care Provided    Care Plan discussed with:    [x]        Patient   []        Family    []        Care Manager   []        Consultant/Specialist :      []          >50% of visit spent in counseling and coordination of care   (Discussed []        CODE status,  []        Care Plan, []        D/C Planning)    ___________________________________________________    Attending Physician: Rosaleen Shone, MD

## 2022-10-31 NOTE — PROGRESS NOTES
Pt Blood Sugar was 54 this AM. Gave Orange Juice and it went up to 74. After a snack she is now at [facility]    Patient with hypoglycemic episode(s) at 0642(time) on 10/31(date). BG value(s) pre-treatment 47    Was patient symptomatic?  [] yes, [] no  Patient was treated with the following rescue medications/treatments: [] D50                [] Glucose tablets                [] Glucagon                [x] 4oz juice                [] 6oz reg soda                [] 8oz low fat milk  BG value post-treatment: 85  Once BG treated and value greater than 80mg/dl, pt was provided with the following:  [] snack

## 2022-10-31 NOTE — PROGRESS NOTES
Problem: Falls - Risk of  Goal: *Absence of Falls  Description: Document Aaron Pedro Fall Risk and appropriate interventions in the flowsheet.   Outcome: Progressing Towards Goal  Note: Fall Risk Interventions:  Mobility Interventions: Communicate number of staff needed for ambulation/transfer         Medication Interventions: Patient to call before getting OOB    Elimination Interventions: Call light in reach

## 2022-10-31 NOTE — ACP (ADVANCE CARE PLANNING)
Advance Care Planning     Advance Care Planning (ACP) Physician/NP/PA Conversation      Date of Conversation: 10/25/2022  Conducted with: Patient with Decision Making Capacity and daughter, Ciro Byrd, at [address]:     Primary Decision Maker: Bhupendra Akins - Child - [phone]  Click here to [address] including selection of the Healthcare Decision Maker Relationship (ie \"Primary\")    Today we discussed Code Status. I initially discussed this with the patient on 10/31/22PM and then discussed with her daughter, Ciro Byrd, at [phone]. Patient reports remembering having this conversation earlier in the hospitalization. I helped redefine Code Status for her and discussed different options. She reports she does not want resuscitation efforts or intubation. She tells me this is too invasive and would not want performed and does not want to live in pain. She is able to relay to me that if her heart were to stop and she was DNR then that means she would die. I discussed with her that many times conditions that lead to the point of resuscitation efforts are reversible and would want to provide her the opportunity to fix these medical issues, but she does not want CPR or ventilation. She reports she has never discussed this with her daughter. I discussed I will talk about it with her and said that is fine, but that it is her decision not her daughters which I notified her she is correct. I called and updated her daughter, Ciro Byrd, about this. She says she has never had this conversation before with her, but she has expressed the desire to be DNR/DNI previously. I discussed with the daughter that her mother has the capacity to make this decision and the daughter respects it, but will also talk with her. Discussed that Code Status can change and is fluid and that we can touch base 11/1/22 after she discusses with her. Care Preferences:    Hospitalization:  \"If your health worsens and it becomes clear that your chance of recovery is unlikely, what would be your preference regarding hospitalization? \"  The patient would prefer hospitalization. Ventilation: \"If you were unable to breathe on your own and your chance of recovery was unlikely, what would be your preference about the use of a ventilator (breathing machine) if it was available to you? \"   The patient would NOT desire the use of a ventilator. Resuscitation: \"In the event your heart stopped as a result of an underlying serious health condition, would you want attempts to be made to restart your heart, or would you prefer a natural death? \"   No, do NOT attempt to resuscitate.     Length of Voluntary ACP Conversation in minutes:  20 minutes    Raza Yin PA-C

## 2022-10-31 NOTE — PROGRESS NOTES
[facility]  Hospitalist Group                                                                                          Hospitalist Progress Note  Raza Yin PA-C  Answering service: [phone] from in house phone        Date of Service:  10/31/2022  NAME:  Amirah Moffett  :  1960  MRN:  [id number]      Admission Summary:   Amirah Moffett is a 65 y.o. female with Stage IV/V CKD, T2DM (on insulin), GERD, HTN (uncontrolled), and HLD who presented to the ED complaining of severe diarrhea with work-up revealing RAYMOND on CKD, metabolic acidosis, and C Diff + (now on PO Vanc x 10 days). Admission c/b recurrent episodes of morning hypoglycemia and now respiratory distress with rising O2 requirement (likely volume induced, broadening to CTX + Doxycycline for antibiotic coverage). Interval history / Subjective:   Patient seen and examined this morning. Her main complaint is difficulty breathing which has been progressing since yesterday. She is off IVF. She reports minimal exertion is causing her more SOB. She just saw nephrology and discussed with them who gave Bumex for her. We discussed her labs and the etiologies for this and interventions. All questions and concerns addressed. She reports her diarrhea has resolved    RN reached out ~1445 that patient now requiring 5L NC. She urinated 4 times to the earlier dose of Bumex. Will broaden her antibiotics and give additional dose of Bumex IV. Discussed with renal as well. I met with the patient in the afternoon. She is having ongoing nausea. She feels her breathing is improving. See ACP note as we discussed Code Status in depth. I also called and updated her daughter, Alvaro Ace [phone], at patient request and we discussed her hospitalization to date and code status. Assessment & Plan:     Acute hypoxia; pulm edema v pna   Rising oxygen requirement since 10/30 with coarse breath sounds diffusely.   CXR 10/30: Bilateral perihilar airspace disease; differential diagnosis is pulmonary edema versus pneumonia. NT-proBNP 11,831. Concern driving etiology due to hypervolemia with recent aggressive IVF resuscitation with C diff infection. -- Diuresis: Bumex 2mg IV on 10/30AM, repeat 1mg 10/31, and rising O2 requirement will give additional dose of 1mg 10/31PM  -- Broaden Abx to CTX + Doxycycline. If continues rising O2 requirement consider Pseudomonal + Vanc coverage. No sputum production to culture. - Oxygen supplementation, wean as tolerate  - Pulm toilet at [facility]      RAYMOND superimposed on CKD stage IV/V, improving   Metabolic Acidosis  - Presented with creatinine 5.33 and BUN 42  - Likely prerenal from dehydration induced by diarrhea  - CT abdomen on admission with no hydronephrosis or obstruction  - Nephrology consulted, appreciate recommendations  -- Metabolic Acidosis: Improved. Continue sodium bicarb tablets per nephrology     Acute diarrhea secondary to C Difficile Infection, POA, improving   - CT abdomen with no evidence of colitis  - Stool studies positive for acute C Diff infection, started on oral vancomycin 10/26 and adjust to 10 day course     Hypertensive emergency, POA, improving   - Presented with systolic blood pressure more than 180 mmHg  - Previously on Cardene drip, weaned off   - Continue home medication with Coreg 25 mg twice daily  -- BP improved on Nifedepine 60mg BID      Asymptomatic bacteriuria, POA  - UA on admission with bacteriuria +1 but no pyuria or leukocyte esterase  - Patient does not have symptoms of UTI i.e. dysuria/urinary frequency/urgency  - No indication to treat her asymptomatic bacteriuria     Chronic normocytic anemia  - Hemoglobin stable around baseline    Diabetes mellitus type 2, uncontrolled  Recurrent hypoglycemia in the AM  - Recurrent episodes of AM hypoglycemia during hospitalization. Decrease Lantus to 10 units nightly. Sliding scale change to just with meals. History of CVA  - With no residual    Hyperlipidemia  - Continue home statin     Obesity  - BMI 34 kg/m²  - Counseling was provided about weight loss     Code status: Her Code Status is currently documented as DNR which was ordered on admission. The H&P states Code Status as \"Full Code. \" Will discuss with her. Prophylaxis: SCDs. Start SQH TID PPx  Care Plan discussed with: Pt, RN  Anticipated Disposition: TBD     Hospital Problems  Date Reviewed: 8/23/2022            Codes Class Noted POA    Diarrhea ICD-10-CM: R19.7  ICD-9-CM: 787.91  10/25/2022 Unknown        RAYMOND (acute kidney injury) [facility]) ICD-10-CM: N17.9  ICD-9-CM: 584.9  10/25/2022 Unknown             Review of Systems:   A comprehensive review of systems was negative except for that written in the HPI. Vital Signs:    Last 24hrs VS reviewed since prior progress note. Most recent are:  Visit Vitals  /62 (BP 1 Location: Left upper arm, BP Patient Position: At rest)   Pulse 75   Temp 100.1 °F (37.8 °C)   Resp 20   Ht 5' 4\" (1.626 m)   Wt 84.4 kg (186 lb)   SpO2 (!) 88%   BMI 31.93 kg/m²       No intake or output data in the 24 hours ending 10/31/22 1514     Physical Examination:     I had a face to face encounter with this patient and independently examined them on 10/31/2022 as outlined below:          Constitutional:  Tachypneic. No supplemental O2 in place   ENT:  Oral mucosa moist.    Resp:  Coarse breath sounds diffusely. No pursed lip breathing. CV:  Regular rhythm, normal rate    GI:  Soft, non distended, non tender. Musculoskeletal:  No edema, warm. No pitting edema in BLE    Neurologic:  Moves all extremities spontaneously and ambulating independently.   AAOx3            Data Review:    Review and/or order of clinical lab test  Review and/or order of tests in the radiology section of CPT  Review and/or order of tests in the medicine section of CPT      Labs:     Recent Labs     10/31/22  0022 10/30/22  0647   WBC 4.9 4.0   HGB 7.9* 8.4*   HCT 25.8* 27.1*   * 101*     Recent Labs     10/31/22  0022 10/30/22  0647 10/29/22  0414    142 143   K 3.6 3.7 3.5   * 115* 116*   CO2 22 20* 20*   BUN 34* 29* 34*   CREA 5.35* 4.94* 4.78*   GLU 73 83 61*   CA 7.2* 7.4* 7.5*   MG  --   --  2.3   PHOS 2.7 2.3* 2.6     Recent Labs     10/31/22  0022 10/30/22  0647 10/29/22  0414   ALB 2.8* 3.1* 3.0*     Lab Results   Component Value Date/Time    Glucose (POC) 119 (H) 10/31/2022 12:27 PM    Glucose (POC) 68 10/31/2022 11:16 AM    Glucose (POC) 85 10/31/2022 07:14 AM    Glucose (POC) 74 10/31/2022 06:57 AM    Glucose (POC) 54 (L) 10/31/2022 06:42 AM     NT-proBNP: 11,831    Medications Reviewed:     Current Facility-Administered Medications   Medication Dose Route Frequency    [START ON 11/1/2022] bumetanide (BUMEX) tablet 2 mg  2 mg Oral DAILY    cefTRIAXone (ROCEPHIN) 1 g in 0.9% sodium chloride 10 mL IV syringe  1 g IntraVENous Q24H    bumetanide (BUMEX) injection 1 mg  1 mg IntraVENous ONCE    insulin glargine (LANTUS) injection 10 Units  10 Units SubCUTAneous QHS    doxycycline (VIBRA-TABS) tablet 100 mg  100 mg Oral Q12H    sodium bicarbonate tablet 1,300 mg  1,300 mg Oral BID    NIFEdipine ER (PROCARDIA XL) tablet 60 mg  60 mg Oral BID    melatonin tablet 3 mg  3 mg Oral QHS PRN    zolpidem (AMBIEN) tablet 5 mg  5 mg Oral QHS PRN    acetaminophen (TYLENOL) tablet 650 mg  650 mg Oral Q6H PRN    ondansetron (ZOFRAN) injection 4 mg  4 mg IntraVENous Q6H PRN    hydrALAZINE (APRESOLINE) 20 mg/mL injection 10 mg  10 mg IntraVENous Q6H PRN    glucose chewable tablet 16 g  4 Tablet Oral PRN    glucagon (GLUCAGEN) injection 1 mg  1 mg IntraMUSCular PRN    dextrose 10 % infusion 0-250 mL  0-250 mL IntraVENous PRN    insulin lispro (HUMALOG) injection   SubCUTAneous AC&HS    vancomycin (FIRVANQ) 50 mg/mL oral solution 125 mg  125 mg Oral Q6H    atorvastatin (LIPITOR) tablet 40 mg  40 mg Oral DAILY    carvediloL (COREG) tablet 25 mg  25 mg Oral BID WITH MEALS     ______________________________________________________________________  EXPECTED LENGTH OF STAY: 3d 2h  ACTUAL LENGTH OF STAY:          6                 Raza Yin PA-C

## 2022-11-01 ENCOUNTER — APPOINTMENT (OUTPATIENT)
Dept: CT IMAGING | Age: 62
DRG: 469 | End: 2022-11-01
Attending: PHYSICIAN ASSISTANT
Payer: MEDICAID

## 2022-11-01 LAB
ALBUMIN SERPL-MCNC: 2.7 G/DL (ref 3.5–5)
ANION GAP SERPL CALC-SCNC: 10 MMOL/L (ref 5–15)
B PERT DNA SPEC QL NAA+PROBE: NOT DETECTED
BASOPHILS # BLD: 0 K/UL (ref 0–0.1)
BASOPHILS NFR BLD: 0 % (ref 0–1)
BORDETELLA PARAPERTUSSIS PCR, BORPAR: NOT DETECTED
BUN SERPL-MCNC: 39 MG/DL (ref 6–20)
BUN/CREAT SERPL: 7 (ref 12–20)
C PNEUM DNA SPEC QL NAA+PROBE: NOT DETECTED
CALCIUM SERPL-MCNC: 7.3 MG/DL (ref 8.5–10.1)
CHLORIDE SERPL-SCNC: 111 MMOL/L (ref 97–108)
CO2 SERPL-SCNC: 21 MMOL/L (ref 21–32)
CREAT SERPL-MCNC: 5.57 MG/DL (ref 0.55–1.02)
CRP SERPL-MCNC: 20.2 MG/DL (ref 0–0.6)
D DIMER PPP FEU-MCNC: 0.79 MG/L FEU (ref 0–0.65)
DIFFERENTIAL METHOD BLD: ABNORMAL
EOSINOPHIL # BLD: 0 K/UL (ref 0–0.4)
EOSINOPHIL NFR BLD: 0 % (ref 0–7)
ERYTHROCYTE [DISTWIDTH] IN BLOOD BY AUTOMATED COUNT: 14.8 % (ref 11.5–14.5)
ERYTHROCYTE [SEDIMENTATION RATE] IN BLOOD: 86 MM/HR (ref 0–30)
FERRITIN SERPL-MCNC: 1608 NG/ML (ref 8–252)
FLUAV H1 2009 PAND RNA SPEC QL NAA+PROBE: NOT DETECTED
FLUAV H1 RNA SPEC QL NAA+PROBE: NOT DETECTED
FLUAV H3 RNA SPEC QL NAA+PROBE: NOT DETECTED
FLUAV SUBTYP SPEC NAA+PROBE: NOT DETECTED
FLUBV RNA SPEC QL NAA+PROBE: NOT DETECTED
GLUCOSE BLD STRIP.AUTO-MCNC: 141 MG/DL (ref 65–117)
GLUCOSE BLD STRIP.AUTO-MCNC: 187 MG/DL (ref 65–117)
GLUCOSE BLD STRIP.AUTO-MCNC: 77 MG/DL (ref 65–117)
GLUCOSE BLD STRIP.AUTO-MCNC: 81 MG/DL (ref 65–117)
GLUCOSE SERPL-MCNC: 46 MG/DL (ref 65–100)
HADV DNA SPEC QL NAA+PROBE: NOT DETECTED
HCOV 229E RNA SPEC QL NAA+PROBE: NOT DETECTED
HCOV HKU1 RNA SPEC QL NAA+PROBE: NOT DETECTED
HCOV NL63 RNA SPEC QL NAA+PROBE: NOT DETECTED
HCOV OC43 RNA SPEC QL NAA+PROBE: NOT DETECTED
HCT VFR BLD AUTO: 24.4 % (ref 35–47)
HGB BLD-MCNC: 7.6 G/DL (ref 11.5–16)
HMPV RNA SPEC QL NAA+PROBE: NOT DETECTED
HPIV1 RNA SPEC QL NAA+PROBE: NOT DETECTED
HPIV2 RNA SPEC QL NAA+PROBE: NOT DETECTED
HPIV3 RNA SPEC QL NAA+PROBE: NOT DETECTED
HPIV4 RNA SPEC QL NAA+PROBE: NOT DETECTED
IMM GRANULOCYTES # BLD AUTO: 0 K/UL (ref 0–0.04)
IMM GRANULOCYTES NFR BLD AUTO: 0 % (ref 0–0.5)
LDH SERPL L TO P-CCNC: 336 U/L (ref 81–246)
LYMPHOCYTES # BLD: 0.4 K/UL (ref 0.8–3.5)
LYMPHOCYTES NFR BLD: 7 % (ref 12–49)
M PNEUMO DNA SPEC QL NAA+PROBE: NOT DETECTED
MAGNESIUM SERPL-MCNC: 1.8 MG/DL (ref 1.6–2.4)
MCH RBC QN AUTO: 26.6 PG (ref 26–34)
MCHC RBC AUTO-ENTMCNC: 31.1 G/DL (ref 30–36.5)
MCV RBC AUTO: 85.3 FL (ref 80–99)
MONOCYTES # BLD: 0.2 K/UL (ref 0–1)
MONOCYTES NFR BLD: 3 % (ref 5–13)
NEUTS SEG # BLD: 5.5 K/UL (ref 1.8–8)
NEUTS SEG NFR BLD: 90 % (ref 32–75)
NRBC # BLD: 0 K/UL (ref 0–0.01)
NRBC BLD-RTO: 0 PER 100 WBC
PHOSPHATE SERPL-MCNC: 3.3 MG/DL (ref 2.6–4.7)
PLATELET # BLD AUTO: 107 K/UL (ref 150–400)
POTASSIUM SERPL-SCNC: 3.7 MMOL/L (ref 3.5–5.1)
PROCALCITONIN SERPL-MCNC: 0.63 NG/ML
RBC # BLD AUTO: 2.86 M/UL (ref 3.8–5.2)
RBC MORPH BLD: ABNORMAL
RBC MORPH BLD: ABNORMAL
RSV RNA SPEC QL NAA+PROBE: NOT DETECTED
RV+EV RNA SPEC QL NAA+PROBE: NOT DETECTED
SARS-COV-2 PCR, COVPCR: DETECTED
SERVICE CMNT-IMP: ABNORMAL
SERVICE CMNT-IMP: ABNORMAL
SERVICE CMNT-IMP: NORMAL
SERVICE CMNT-IMP: NORMAL
SODIUM SERPL-SCNC: 142 MMOL/L (ref 136–145)
WBC # BLD AUTO: 6.1 K/UL (ref 3.6–11)

## 2022-11-01 PROCEDURE — 74011250637 HC RX REV CODE- 250/637: Performed by: PHYSICIAN ASSISTANT

## 2022-11-01 PROCEDURE — 0202U NFCT DS 22 TRGT SARS-COV-2: CPT

## 2022-11-01 PROCEDURE — 87077 CULTURE AEROBIC IDENTIFY: CPT

## 2022-11-01 PROCEDURE — 74011250636 HC RX REV CODE- 250/636: Performed by: HOSPITALIST

## 2022-11-01 PROCEDURE — 36415 COLL VENOUS BLD VENIPUNCTURE: CPT

## 2022-11-01 PROCEDURE — 71250 CT THORAX DX C-: CPT

## 2022-11-01 PROCEDURE — 74011250636 HC RX REV CODE- 250/636: Performed by: PHYSICIAN ASSISTANT

## 2022-11-01 PROCEDURE — 84145 PROCALCITONIN (PCT): CPT

## 2022-11-01 PROCEDURE — 85652 RBC SED RATE AUTOMATED: CPT

## 2022-11-01 PROCEDURE — 87070 CULTURE OTHR SPECIMN AEROBIC: CPT

## 2022-11-01 PROCEDURE — 82962 GLUCOSE BLOOD TEST: CPT

## 2022-11-01 PROCEDURE — 80069 RENAL FUNCTION PANEL: CPT

## 2022-11-01 PROCEDURE — 74011250637 HC RX REV CODE- 250/637: Performed by: STUDENT IN AN ORGANIZED HEALTH CARE EDUCATION/TRAINING PROGRAM

## 2022-11-01 PROCEDURE — 74011636637 HC RX REV CODE- 636/637: Performed by: PHYSICIAN ASSISTANT

## 2022-11-01 PROCEDURE — 65660000001 HC RM ICU INTERMED STEPDOWN

## 2022-11-01 PROCEDURE — 85379 FIBRIN DEGRADATION QUANT: CPT

## 2022-11-01 PROCEDURE — 85025 COMPLETE CBC W/AUTO DIFF WBC: CPT

## 2022-11-01 PROCEDURE — 83615 LACTATE (LD) (LDH) ENZYME: CPT

## 2022-11-01 PROCEDURE — 86140 C-REACTIVE PROTEIN: CPT

## 2022-11-01 PROCEDURE — 87040 BLOOD CULTURE FOR BACTERIA: CPT

## 2022-11-01 PROCEDURE — 74011250637 HC RX REV CODE- 250/637: Performed by: INTERNAL MEDICINE

## 2022-11-01 PROCEDURE — 83735 ASSAY OF MAGNESIUM: CPT

## 2022-11-01 PROCEDURE — 74011250637 HC RX REV CODE- 250/637: Performed by: NURSE PRACTITIONER

## 2022-11-01 PROCEDURE — 74011000258 HC RX REV CODE- 258: Performed by: PHYSICIAN ASSISTANT

## 2022-11-01 PROCEDURE — 87186 SC STD MICRODIL/AGAR DIL: CPT

## 2022-11-01 PROCEDURE — 74011000250 HC RX REV CODE- 250: Performed by: PHYSICIAN ASSISTANT

## 2022-11-01 PROCEDURE — 74176 CT ABD & PELVIS W/O CONTRAST: CPT

## 2022-11-01 PROCEDURE — 82728 ASSAY OF FERRITIN: CPT

## 2022-11-01 PROCEDURE — 74011250637 HC RX REV CODE- 250/637: Performed by: FAMILY MEDICINE

## 2022-11-01 RX ORDER — BENZONATATE 100 MG/1
100 CAPSULE ORAL
Status: DISCONTINUED | OUTPATIENT
Start: 2022-11-01 | End: 2022-11-09 | Stop reason: HOSPADM

## 2022-11-01 RX ORDER — INSULIN GLARGINE 100 [IU]/ML
12 INJECTION, SOLUTION SUBCUTANEOUS
Status: DISCONTINUED | OUTPATIENT
Start: 2022-11-01 | End: 2022-11-04

## 2022-11-01 RX ORDER — PANTOPRAZOLE SODIUM 40 MG/1
40 TABLET, DELAYED RELEASE ORAL
Status: DISCONTINUED | OUTPATIENT
Start: 2022-11-02 | End: 2022-11-09 | Stop reason: HOSPADM

## 2022-11-01 RX ORDER — DEXAMETHASONE 4 MG/1
6 TABLET ORAL DAILY
Status: DISCONTINUED | OUTPATIENT
Start: 2022-11-01 | End: 2022-11-09 | Stop reason: HOSPADM

## 2022-11-01 RX ORDER — PSEUDOEPHEDRINE HCL 30 MG
30 TABLET ORAL
Status: DISCONTINUED | OUTPATIENT
Start: 2022-11-01 | End: 2022-11-09 | Stop reason: HOSPADM

## 2022-11-01 RX ORDER — LORAZEPAM 0.5 MG/1
0.5 TABLET ORAL
Status: DISCONTINUED | OUTPATIENT
Start: 2022-11-01 | End: 2022-11-09 | Stop reason: HOSPADM

## 2022-11-01 RX ORDER — ACETAMINOPHEN 500 MG
1000 TABLET ORAL
Status: DISCONTINUED | OUTPATIENT
Start: 2022-11-01 | End: 2022-11-09 | Stop reason: HOSPADM

## 2022-11-01 RX ORDER — SODIUM BICARBONATE 650 MG/1
650 TABLET ORAL 3 TIMES DAILY
Status: DISCONTINUED | OUTPATIENT
Start: 2022-11-01 | End: 2022-11-02

## 2022-11-01 RX ORDER — IPRATROPIUM BROMIDE AND ALBUTEROL SULFATE 2.5; .5 MG/3ML; MG/3ML
3 SOLUTION RESPIRATORY (INHALATION)
Status: DISCONTINUED | OUTPATIENT
Start: 2022-11-01 | End: 2022-11-09 | Stop reason: HOSPADM

## 2022-11-01 RX ORDER — GUAIFENESIN 100 MG/5ML
100 SOLUTION ORAL
Status: DISCONTINUED | OUTPATIENT
Start: 2022-11-01 | End: 2022-11-09 | Stop reason: HOSPADM

## 2022-11-01 RX ORDER — VANCOMYCIN 2 GRAM/500 ML IN 0.9 % SODIUM CHLORIDE INTRAVENOUS
2000 ONCE
Status: COMPLETED | OUTPATIENT
Start: 2022-11-01 | End: 2022-11-02

## 2022-11-01 RX ADMIN — SODIUM BICARBONATE 650 MG: 650 TABLET ORAL at 23:05

## 2022-11-01 RX ADMIN — HEPARIN SODIUM 5000 UNITS: 5000 INJECTION INTRAVENOUS; SUBCUTANEOUS at 23:05

## 2022-11-01 RX ADMIN — SODIUM BICARBONATE 650 MG: 650 TABLET ORAL at 16:09

## 2022-11-01 RX ADMIN — INSULIN GLARGINE 12 UNITS: 100 INJECTION, SOLUTION SUBCUTANEOUS at 23:05

## 2022-11-01 RX ADMIN — HEPARIN SODIUM 5000 UNITS: 5000 INJECTION INTRAVENOUS; SUBCUTANEOUS at 16:09

## 2022-11-01 RX ADMIN — Medication 125 MG: at 00:30

## 2022-11-01 RX ADMIN — CEFEPIME 2 G: 2 INJECTION, POWDER, FOR SOLUTION INTRAVENOUS at 12:12

## 2022-11-01 RX ADMIN — HEPARIN SODIUM 5000 UNITS: 5000 INJECTION INTRAVENOUS; SUBCUTANEOUS at 07:20

## 2022-11-01 RX ADMIN — ATORVASTATIN CALCIUM 40 MG: 40 TABLET, FILM COATED ORAL at 09:30

## 2022-11-01 RX ADMIN — CARVEDILOL 25 MG: 12.5 TABLET, FILM COATED ORAL at 07:19

## 2022-11-01 RX ADMIN — VANCOMYCIN HYDROCHLORIDE 2000 MG: 10 INJECTION, POWDER, LYOPHILIZED, FOR SOLUTION INTRAVENOUS at 17:44

## 2022-11-01 RX ADMIN — HEPARIN SODIUM 5000 UNITS: 5000 INJECTION INTRAVENOUS; SUBCUTANEOUS at 00:30

## 2022-11-01 RX ADMIN — ACETAMINOPHEN 1000 MG: 500 TABLET ORAL at 16:08

## 2022-11-01 RX ADMIN — Medication 4 TABLET: at 05:36

## 2022-11-01 RX ADMIN — Medication 125 MG: at 23:06

## 2022-11-01 RX ADMIN — LORAZEPAM 0.5 MG: 0.5 TABLET ORAL at 17:49

## 2022-11-01 RX ADMIN — CARVEDILOL 25 MG: 12.5 TABLET, FILM COATED ORAL at 17:49

## 2022-11-01 RX ADMIN — BUMETANIDE 2 MG: 1 TABLET ORAL at 09:30

## 2022-11-01 RX ADMIN — CEFEPIME 1 G: 1 INJECTION, POWDER, FOR SOLUTION INTRAMUSCULAR; INTRAVENOUS at 23:06

## 2022-11-01 RX ADMIN — ACETAMINOPHEN 650 MG: 325 TABLET ORAL at 04:43

## 2022-11-01 RX ADMIN — NIFEDIPINE 60 MG: 60 TABLET, EXTENDED RELEASE ORAL at 17:49

## 2022-11-01 RX ADMIN — Medication 125 MG: at 13:55

## 2022-11-01 RX ADMIN — Medication 2 UNITS: at 12:12

## 2022-11-01 RX ADMIN — ZOLPIDEM TARTRATE 5 MG: 5 TABLET ORAL at 23:05

## 2022-11-01 RX ADMIN — Medication 125 MG: at 07:19

## 2022-11-01 RX ADMIN — SODIUM BICARBONATE 1300 MG: 650 TABLET ORAL at 09:30

## 2022-11-01 RX ADMIN — Medication 125 MG: at 17:49

## 2022-11-01 RX ADMIN — DEXAMETHASONE 6 MG: 4 TABLET ORAL at 16:04

## 2022-11-01 RX ADMIN — NIFEDIPINE 60 MG: 60 TABLET, EXTENDED RELEASE ORAL at 09:30

## 2022-11-01 RX ADMIN — DOXYCYCLINE HYCLATE 100 MG: 100 TABLET, COATED ORAL at 09:30

## 2022-11-01 NOTE — PROGRESS NOTES
Renal Progress Note    NAME:  Braxton Barger   :   1960   MRN:   [id number]     Date/Time:  2022 10:23 AM      Assessment:     Acute Kidney Injury --> sec to vol depletion with GI losses - Scr remains above her baseline  CKD (Stage 4/Stage 5) . Patient is aware of future need for RRT. She was already referred to [facility] educational seminar. C Diff diarrhea, no more diarrhea  Met Acidosis - sec to CKD and partly diarrhea-improved  Hypomagnesemia  HTN-uncontrolled  DM2  Acute hypoxic resp failure, due to volume overload vs PNA 10/30       Plan:     No immediate need for RRT  PPI or H2 blockers for abdominal pain  Restart bumex po tomorrow, will give one dose IV today  Antibiotics per primary team  Adjusted Na Bicarb to 650 MG TID  Replete Mg prn  Encourage oral intake. Avoid NSAIDs + IV contrast.  Dose meds for GFR. BP control         Subjective:     10/28 feeling ok, still has diarrhea, C diff+ on po vanco, cr at baseline, Na higher-hypoglycemic this am  10/31 was hypoxic yesterday and SOB, CXR showed pulm edema vs PNA. Has productive cough, febrile. Diarrhea has stopped. Given one dose bumex and started doxy. She feels better today, requires 2 lit O2  22 Patient c/o SOB, cough, sputum production and abdominal pain.       Review of Systems:  Y  N       Y  N  []         []          Fever/chills                                               []         []          Chest Pain  [x]         []          Cough                                                       []         []          Diarrhea   [x]         []          Sputum                                                     []         []          Constipation  [x]         []          SOB/WALLACE                                                []         []          Nausea/Vomit  [x]         []          Abd Pain                                                    []         []          Tolerating PT  []         []          Dysuria []         []          Tolerating Diet     []        Unable to obtain  ROS due to  []        mental status change  []        sedated   []        intubated    Medications reviewed:  Current Facility-Administered Medications   Medication Dose Route Frequency    bumetanide (BUMEX) tablet 2 mg  2 mg Oral DAILY    cefTRIAXone (ROCEPHIN) 1 g in 0.9% sodium chloride 10 mL IV syringe  1 g IntraVENous Q24H    [Held by provider] insulin glargine (LANTUS) injection 10 Units  10 Units SubCUTAneous QHS    insulin lispro (HUMALOG) injection   SubCUTAneous TIDAC    heparin (porcine) injection 5,000 Units  5,000 Units SubCUTAneous Q8H    prochlorperazine (COMPAZINE) tablet 5 mg  5 mg Oral Q6H PRN    Or    prochlorperazine (COMPAZINE) injection 5 mg  5 mg IntraVENous Q6H PRN    doxycycline (VIBRA-TABS) tablet 100 mg  100 mg Oral Q12H    sodium bicarbonate tablet 1,300 mg  1,300 mg Oral BID    NIFEdipine ER (PROCARDIA XL) tablet 60 mg  60 mg Oral BID    melatonin tablet 3 mg  3 mg Oral QHS PRN    zolpidem (AMBIEN) tablet 5 mg  5 mg Oral QHS PRN    acetaminophen (TYLENOL) tablet 650 mg  650 mg Oral Q6H PRN    ondansetron (ZOFRAN) injection 4 mg  4 mg IntraVENous Q6H PRN    hydrALAZINE (APRESOLINE) 20 mg/mL injection 10 mg  10 mg IntraVENous Q6H PRN    glucose chewable tablet 16 g  4 Tablet Oral PRN    glucagon (GLUCAGEN) injection 1 mg  1 mg IntraMUSCular PRN    dextrose 10 % infusion 0-250 mL  0-250 mL IntraVENous PRN    vancomycin (FIRVANQ) 50 mg/mL oral solution 125 mg  125 mg Oral Q6H    atorvastatin (LIPITOR) tablet 40 mg  40 mg Oral DAILY    carvediloL (COREG) tablet 25 mg  25 mg Oral BID WITH MEALS        Objective:   Vitals:  Visit Vitals  BP (!) 142/76 (BP 1 Location: Left arm, BP Patient Position: At rest)   Pulse 68   Temp 98.8 °F (37.1 °C)   Resp 18   Ht 5' 4\" (1.626 m)   Wt 84.4 kg (186 lb)   LMP 10/07/2012   SpO2 94%   BMI 31.93 kg/m²     Temp (24hrs), Av °F (37.8 °C), Min:98.8 °F (37.1 °C), Max:101 °F (38.3 °C)    O2 Flow Rate (L/min): 5 l/min O2 Device: Nasal cannula    Last 24hr Input/Output:  No intake or output data in the 24 hours ending 11/01/22 0921       PHYSICAL EXAM:    Seen in CCU 24    General:    Awake and alert, uncomfortable. Eyes:   No icterus    Lungs:   BL crackles    Heart:   RRR, No S 3 gallop, no pericardial rub  . Abdomen:   Not distended. Tender    Extremities: 1+ edema    Psych:  Calm    Neurologic: Responding appropriately.         []        Telemetry Reviewed     []        NSR []        PAC/PVCs   []        Afib  []        Paced   []        NSVT   []        Hough []        NGT  []        Intubated on vent    Lab Data Reviewed:    Recent Results (from the past 24 hour(s))   GLUCOSE, POC    Collection Time: 10/31/22 11:16 AM   Result Value Ref Range    Glucose (POC) 68 65 - 117 mg/dL    Performed by RYAN Kathryn  CON    GLUCOSE, POC    Collection Time: 10/31/22 12:27 PM   Result Value Ref Range    Glucose (POC) 119 (H) 65 - 117 mg/dL    Performed by RYAN Kathryn  CON    GLUCOSE, POC    Collection Time: 10/31/22  4:47 PM   Result Value Ref Range    Glucose (POC) 84 65 - 117 mg/dL    Performed by Shazia Rain   CON    GLUCOSE, POC    Collection Time: 10/31/22  8:51 PM   Result Value Ref Range    Glucose (POC) 85 65 - 117 mg/dL    Performed by Shazia Rain   CON    RENAL FUNCTION PANEL    Collection Time: 11/01/22  4:09 AM   Result Value Ref Range    Sodium 142 136 - 145 mmol/L    Potassium 3.7 3.5 - 5.1 mmol/L    Chloride 111 (H) 97 - 108 mmol/L    CO2 21 21 - 32 mmol/L    Anion gap 10 5 - 15 mmol/L    Glucose 46 (LL) 65 - 100 mg/dL    BUN 39 (H) 6 - 20 MG/DL    Creatinine 5.57 (H) 0.55 - 1.02 MG/DL    BUN/Creatinine ratio 7 (L) 12 - 20      eGFR 8 (L) >60 ml/min/1.73m2    Calcium 7.3 (L) 8.5 - 10.1 MG/DL    Phosphorus 3.3 2.6 - 4.7 MG/DL    Albumin 2.7 (L) 3.5 - 5.0 g/dL   CBC WITH AUTOMATED DIFF    Collection Time: 11/01/22  4:09 AM   Result Value Ref Range    WBC 6.1 3.6 - 11.0 K/uL    RBC 2.86 (L) 3.80 - 5.20 M/uL    HGB 7.6 (L) 11.5 - 16.0 g/dL    HCT 24.4 (L) 35.0 - 47.0 %    MCV 85.3 80.0 - 99.0 FL    MCH 26.6 26.0 - 34.0 PG    MCHC 31.1 30.0 - 36.5 g/dL    RDW 14.8 (H) 11.5 - 14.5 %    PLATELET 720 (L) 971 - 400 K/uL    NRBC 0.0 0  WBC    ABSOLUTE NRBC 0.00 0.00 - 0.01 K/uL    NEUTROPHILS 90 (H) 32 - 75 %    LYMPHOCYTES 7 (L) 12 - 49 %    MONOCYTES 3 (L) 5 - 13 %    EOSINOPHILS 0 0 - 7 %    BASOPHILS 0 0 - 1 %    IMMATURE GRANULOCYTES 0 0.0 - 0.5 %    ABS. NEUTROPHILS 5.5 1.8 - 8.0 K/UL    ABS. LYMPHOCYTES 0.4 (L) 0.8 - 3.5 K/UL    ABS. MONOCYTES 0.2 0.0 - 1.0 K/UL    ABS. EOSINOPHILS 0.0 0.0 - 0.4 K/UL    ABS. BASOPHILS 0.0 0.0 - 0.1 K/UL    ABS. IMM.  GRANS. 0.0 0.00 - 0.04 K/UL    DF SMEAR SCANNED      RBC COMMENTS GHISLAINE CELLS  PRESENT        RBC COMMENTS OVALOCYTES  PRESENT       MAGNESIUM    Collection Time: 11/01/22  4:09 AM   Result Value Ref Range    Magnesium 1.8 1.6 - 2.4 mg/dL   GLUCOSE, POC    Collection Time: 11/01/22  5:49 AM   Result Value Ref Range    Glucose (POC) 77 65 - 117 mg/dL    Performed by Christen Neither        Total time spent with patient:  []        15   []        25   []        35   []         __ minutes    []        Critical Care Provided    Care Plan discussed with:    [x]        Patient   []        Family    []        Care Manager   []        Consultant/Specialist :      []          >50% of visit spent in counseling and coordination of care   (Discussed []        CODE status,  []        Care Plan, []        D/C Planning)    ___________________________________________________    Attending Physician: Shanon Gonzalez MD

## 2022-11-01 NOTE — PROGRESS NOTES
[id number] Adult  Hospitalist Group                                                                                          Hospitalist Progress Note  Raza Yin PA-C  Answering service: [phone] from in house phone        Date of Service:  2022  NAME:  Marlene Dc  :  1960  MRN:  [id number]      Admission Summary:   Marlene Dc is a 65 y.o. female with Stage IV/V CKD, T2DM (on insulin), GERD, HTN (uncontrolled), and HLD who presented to the ED complaining of severe diarrhea with work-up revealing RAYMOND on CKD, metabolic acidosis, and C Diff + (now on PO Vanc x 10 days). Admission c/b recurrent episodes of morning hypoglycemia and now respiratory distress with rising O2 requirement with CT Scan revealing multifocal PNA and diagnosed COVID-19+ (On Dexamethasone, Pulmonology consulted, broadened to Cefepime+Vancomycin)     Interval history / Subjective:   Patient seen and examined multiple times during the day. In the morning she reports ongoing SOB and tachypnea. She feels it may be slightly improved. She was seen on return from the CT Scan and reviewed with her CT Scan was concerning for a multifocal PNA, which could be viral or bacterial in origin but that will broaden her antibiotics to cover MRSA and Pseudomonas and test her against viruses. I reviewed her CT Abdomen was not very revealing to me. I returned and met with her when her COVID test came back positive and that I anticipate this represents viral PNA with possible superimposed bacterial PNA. Discussed she has display of severe COVID-19 infection with rising O2 requirement. Discussed oxygen requirement could continue to rise even with our best efforts. We re-discussed Code Status and she discussed this with her daughter last night at my request and she wants to remain DNR/DNI at this time.     I called and updated her daughter, Dragan Vega, and updated her about the imaging and the COVID-19 diagnosis. Discussed I believe the constellation of symptoms she is displaying is consistent with COVID-19. Discussed interventions, pulmonary consult, and more labs which could lead to the addition of a biologic agent. She confirms she did discuss code status with her mother last night and the patient reiterated her desire to be DNR/DNI. I spoke with pharmacy about antibiotic dosing based on her renal dysfunction and appreciate their assistance. Discussed with them the addition of a biologic, Baricitinib, with COVID-19. They note that with a GFR of < 15 they do not recommend Baricitinib, but if her CRP is > 7.5 would recommend Tocilizumab 600mg IV x 1. This was reported to me as a restricted drug and needed to be ordered by Pulmonology or ICU. Her positive COVID test was relayed to pulmonologist, Dr. Chadwick Stone, and the biologic recommendations from pharmacy and the controlled nature of the drugs. On transfer to the floor I discussed the plans with the nursing staff. Due to rising O2 needs and risk for decompensation will transfer to Intermediate Care. Assessment & Plan:     Acute hypoxia  Multifocal PNA, + COVID-19 on 11/1/22, viral with possible superimposed bacterial PNA  -- Rising oxygen requirement since 10/30 with coarse breath sounds diffusely. CXR 10/30: Bilateral perihilar airspace disease; differential diagnosis is pulmonary edema versus pneumonia. -- NT-proBNP 11,831. Reviewed with renal and difficult to interpret with her degree of renal dysfunction  -- Diuresis: Bumex 2mg IV on 10/30AM, repeat 1mg 10/31, and rising O2 requirement will give additional dose of 1mg 10/31PM  -- Abx: Doxycycline (10/30-11/1) and broadened to addition of CTX (11/1). Due to rising O2 requirement and multifocal PNA with concern for superimposed bacterial PNA, broadened to renally dose of Vancomycin and Cefepime (D1 = 11/1/22).  Will check PCT to help guide Abx management  -- Repeat Imaging 11/1/22: CT C/A/P: Bilateral diffuse airspace infiltrates c/w multifocal PNA, small BL pleural effusions, no acute findings in Abdomen/pelvis  -- COVID-19+ on 11/1/22.  -- Dexamethasone 6mg q24h. D1 = 11/1/22  -- Check Ferritin, LDH, CRP, ESR, D-Dimer. If CRP is > 7.5, will dose Tocilizumab 600mg x 1 per pharmacy recommendation. Restricted drug needed to be ordered by Pulmonology. Of note, GFR <15 and therefore not candidate for Baricitinib. Check LFT's prior to Toci and trend after use  -- Follow pending micro: BCx 11/1, Respiratory sputum culture 10/31, urine strep/legionella and IN MRSA swab ordered  -- Oxygen supplementation  -- Pulm toilet at bedisde   -- Supportive care with Tylenol 1g q6h PRN for fever, cough suppressants, Sudafed PRN    Anxiety  -- Significant anxiety 11/1 with change in medical conditions. Start Ativan 0.5mg BID PRN     RAYMOND superimposed on CKD stage IV/V, improving   Metabolic Acidosis  - Presented with creatinine 5.33 and BUN 42  - Likely prerenal from dehydration induced by diarrhea  - CT abdomen on admission with no hydronephrosis or obstruction  - Nephrology consulted, appreciate recommendations  -- Metabolic Acidosis: Improved.  Continue sodium bicarb tablets per nephrology     Acute diarrhea secondary to C Difficile Infection, POA, improving   - CT abdomen with no evidence of colitis  - Stool studies positive for acute C Diff infection, started on oral vancomycin 10/26 and adjust to 10 day course  -- Attempt to limit duration of abx with recent C diff infection     Hypertensive emergency, POA, improving   - Presented with systolic blood pressure more than 180 mmHg  - Previously on Cardene drip, weaned off   - Continue home medication with Coreg 25 mg twice daily  -- BP improved on Nifedepine 60mg BID      Asymptomatic bacteriuria, POA  - UA on admission with bacteriuria +1 but no pyuria or leukocyte esterase  - Patient does not have symptoms of UTI i.e. dysuria/urinary frequency/urgency  - No indication to treat her asymptomatic bacteriuria     Chronic normocytic anemia  - Hemoglobin stable around baseline    Diabetes mellitus type 2, uncontrolled  Recurrent hypoglycemia in the AM  - Recurrent episodes of AM hypoglycemia during hospitalization. Plan was to hold Lantus 11/1PM, but now with concerns for Dexamethasone induced hyperglycemia will restart Lantus 12 units nightly and continue meal time insulin. Her Lantus dose PTA was 16 units so further titration needed     History of CVA  - No residual deficits    Hyperlipidemia  - Continue home statin     Obesity  - BMI 34 kg/m²  - Counseling was provided about weight loss     Code status: Her Code Status is currently documented as DNR which was ordered on admission. This has been addressed with patient and daughter daily from 10/31 and confirming as DNR  Prophylaxis: SCDs. Start SQH TID PPx  Care Plan discussed with: Pt, RN  Anticipated Disposition: TBD     Hospital Problems  Date Reviewed: 8/23/2022            Codes Class Noted POA    Diarrhea ICD-10-CM: R19.7  ICD-9-CM: 787.91  10/25/2022 Unknown        RAYMOND (acute kidney injury) [facility]) ICD-10-CM: N17.9  ICD-9-CM: 584.9  10/25/2022 Unknown             Review of Systems:   A comprehensive review of systems was negative except for that written in the HPI. Vital Signs:    Last 24hrs VS reviewed since prior progress note. Most recent are:  Visit Vitals  BP (!) 156/73 (BP 1 Location: Left arm, BP Patient Position: Sitting)   Pulse 86   Temp (!) 101 °F (38.3 °C)   Resp 25   Ht 5' 4\" (1.626 m)   Wt 84.4 kg (186 lb)   SpO2 97%   BMI 31.93 kg/m²       No intake or output data in the 24 hours ending 11/01/22 1607     Physical Examination:     I had a face to face encounter with this patient and independently examined them on 11/1/2022 as outlined below:    Constitutional:  Tachypneic. 5-6L NC in place   ENT:  Oral mucosa moist.    Resp:  Coarse breath sounds diffusely. No pursed lip breathing.  Leaned forward breathing rapidly   CV:  Regular rhythm, normal rate    GI:  Soft, non distended, non tender. Musculoskeletal:  No edema, warm. No pitting edema in BLE    Neurologic:  Moves all extremities spontaneously and ambulating independently. AAOx3                                  Data Review:    Review and/or order of clinical lab test  Review and/or order of tests in the radiology section of CPT  Review and/or order of tests in the medicine section of [facility]      Labs:     Recent Labs     11/01/22  0409 10/31/22  0022   WBC 6.1 4.9   HGB 7.6* 7.9*   HCT 24.4* 25.8*   * 101*     Recent Labs     11/01/22  0409 10/31/22  0022 10/30/22  0647    141 142   K 3.7 3.6 3.7   * 113* 115*   CO2 21 22 20*   BUN 39* 34* 29*   CREA 5.57* 5.35* 4.94*   GLU 46* 73 83   CA 7.3* 7.2* 7.4*   MG 1.8  --   --    PHOS 3.3 2.7 2.3*     Recent Labs     11/01/22  0409 10/31/22  0022 10/30/22  0647   ALB 2.7* 2.8* 3.1*     No results for input(s): INR, PTP, APTT, INREXT in the last 72 hours. No results for input(s): FE, TIBC, PSAT, FERR in the last 72 hours. No results found for: FOL, RBCF   No results for input(s): PH, PCO2, PO2 in the last 72 hours. No results for input(s): CPK, CKNDX, TROIQ in the last 72 hours.     No lab exists for component: CPKMB  No results found for: CHOL, CHOLX, CHLST, CHOLV, HDL, HDLP, LDL, LDLC, DLDLP, TGLX, TRIGL, TRIGP, CHHD, CHHDX  Lab Results   Component Value Date/Time    Glucose (POC) 81 11/01/2022 04:03 PM    Glucose (POC) 141 (H) 11/01/2022 11:23 AM    Glucose (POC) 77 11/01/2022 05:49 AM    Glucose (POC) 85 10/31/2022 08:51 PM    Glucose (POC) 84 10/31/2022 04:47 PM     Lab Results   Component Value Date/Time    Color YELLOW/STRAW 10/26/2022 05:30 AM    Appearance CLEAR 10/26/2022 05:30 AM    Specific gravity 1.011 10/26/2022 05:30 AM    Specific gravity 1.025 04/19/2013 11:30 AM    pH (UA) 6.0 10/26/2022 05:30 AM    Protein 300 (A) 10/26/2022 05:30 AM    Glucose Negative 10/26/2022 05:30 AM    Ketone Negative 10/26/2022 05:30 AM    Bilirubin Negative 10/26/2022 05:30 AM    Urobilinogen 0.2 10/26/2022 05:30 AM    Nitrites Negative 10/26/2022 05:30 AM    Leukocyte Esterase Negative 10/26/2022 05:30 AM    Epithelial cells FEW 10/26/2022 05:30 AM    Bacteria 1+ (A) 10/26/2022 05:30 AM    WBC 0-4 10/26/2022 05:30 AM    RBC 0-5 10/26/2022 05:30 AM     Imaging:   -- Radiology: CT C/A/P on 11/1:  IMPRESSION     1. Bilateral, diffuse airspace infiltrates consistent with multifocal pneumonia. There are small bilateral pleural effusions. 2. No acute findings in the abdomen or pelvis. 3. Gallstones.     Medications Reviewed:     Current Facility-Administered Medications   Medication Dose Route Frequency    [START ON 11/2/2022] pantoprazole (PROTONIX) tablet 40 mg  40 mg Oral ACB    [START ON 11/2/2022] cefepime (MAXIPIME) 1 g in 0.9% sodium chloride (MBP/ADV) 50 mL MBP  1 g IntraVENous Q12H    sodium bicarbonate tablet 650 mg  650 mg Oral TID    Vancomycin - Pharmacy to Dose   Other Rx Dosing/Monitoring    vancomycin (VANCOCIN) 2000 mg in  ml infusion  2,000 mg IntraVENous ONCE    dexAMETHasone (DECADRON) tablet 6 mg  6 mg Oral DAILY    insulin glargine (LANTUS) injection 12 Units  12 Units SubCUTAneous QHS    benzonatate (TESSALON) capsule 100 mg  100 mg Oral TID PRN    guaiFENesin (ROBITUSSIN) 100 mg/5 mL oral liquid 100 mg  100 mg Oral Q4H PRN    pseudoephedrine (SUDAFED) tablet 30 mg  30 mg Oral Q6H PRN    LORazepam (ATIVAN) tablet 0.5 mg  0.5 mg Oral BID PRN    albuterol-ipratropium (DUO-NEB) 2.5 MG-0.5 MG/3 ML  3 mL Nebulization Q6H PRN    acetaminophen (TYLENOL) tablet 1,000 mg  1,000 mg Oral Q6H PRN    bumetanide (BUMEX) tablet 2 mg  2 mg Oral DAILY    insulin lispro (HUMALOG) injection   SubCUTAneous TIDAC    heparin (porcine) injection 5,000 Units  5,000 Units SubCUTAneous Q8H    prochlorperazine (COMPAZINE) tablet 5 mg  5 mg Oral Q6H PRN    Or    prochlorperazine (COMPAZINE) injection 5 mg  5 mg IntraVENous Q6H PRN    NIFEdipine ER (PROCARDIA XL) tablet 60 mg  60 mg Oral BID    melatonin tablet 3 mg  3 mg Oral QHS PRN    zolpidem (AMBIEN) tablet 5 mg  5 mg Oral QHS PRN    ondansetron (ZOFRAN) injection 4 mg  4 mg IntraVENous Q6H PRN    hydrALAZINE (APRESOLINE) 20 mg/mL injection 10 mg  10 mg IntraVENous Q6H PRN    glucose chewable tablet 16 g  4 Tablet Oral PRN    glucagon (GLUCAGEN) injection 1 mg  1 mg IntraMUSCular PRN    dextrose 10 % infusion 0-250 mL  0-250 mL IntraVENous PRN    vancomycin (FIRVANQ) 50 mg/mL oral solution 125 mg  125 mg Oral Q6H    atorvastatin (LIPITOR) tablet 40 mg  40 mg Oral DAILY    carvediloL (COREG) tablet 25 mg  25 mg Oral BID WITH MEALS     ______________________________________________________________________  EXPECTED LENGTH OF STAY: 3d 2h  ACTUAL LENGTH OF STAY:          7                 Raza Yin PA-C

## 2022-11-01 NOTE — PROGRESS NOTES
Pharmacist Note - Vancomycin Dosing    Consult provided for this 65 y.o. female for indication of HAP. Antibiotic regimen(s): Vancomycin + Cefepime  Patient on vancomycin PTA? NO     Recent Labs     22  0409 10/31/22  0022 10/30/22  0647   WBC 6.1 4.9 4.0   CREA 5.57* 5.35* 4.94*   BUN 39* 34* 29*     Frequency of BMP: Daily  Height: 162.6 cm  Weight: 84.4 kg  Est CrCl: 11.2 ml/min; UO: not measured  Temp (24hrs), Av °F (37.8 °C), Min:98.8 °F (37.1 °C), Max:101 °F (38.3 °C)    Cultures: blood cultures pending    MRSA Swab ordered (if applicable)? YES    The plan below is expected to result in a target range of AUC/VARSHA 400-600    Therapy will be initiated with a loading dose of 2000 mg IV x 1 dose. Subsequent dosing by levels due to  acute kidney injury/CKD. Recommend checking level on 11/3 with morning labs.

## 2022-11-01 NOTE — PROGRESS NOTES
Patient calling for assistance ambulating as she \"feels dizzy\".  Patient given meds to help with fever and body aches per [facility].    1815- Patient's Crp resulted at 20.20, MD notified with plan of care recommendations via [employer]

## 2022-11-01 NOTE — CONSULTS
Pulmonary    Chart reviewed and images viewed    Reason:  Hypoxemia  Requesting:  Dr Litzy Benitez    66 yo female with c diff colitis and ARF felt to be prerenal with increased shortness of breath and hypoxemia. Has been receiving IVF. Has been started on broad abx for possible pneumonia by primary team.  Diuretic has been ordered along with cultures, pna serologies and procalcitonin    CXR - CM, bilateral perihilar asdz - appears consistent with pulmonary edema  Chest CT - pnd  BUN / Cr:  39 /5.5 (both increasing)  proBNP  11,831    Patient Vitals for the past 4 hrs:   Pulse   22 1200 73   22 1000 74   Temp (24hrs), Av °F (37.8 °C), Min:98.8 °F (37.1 °C), Max:101 °F (38.3 °C)  No intake or output data in the 24 hours ending 22 1300    Pulmonary Impression / Recommendations:    Acute hypoxemic respiratory failure secondary to pulmonary edema and volume overload in patient receiving hydration for acute renal insufficiency in setting of c diff colitis. At risk for pneumonia but cxr seems more c/w volume overload.       --follow up CT  --follow up cultures and procalcitonin - hopefully will be able to decrease abx quickly in the setting of c diff colitis  --assess oxygenation response to diuresis    Amy Saab MD

## 2022-11-01 NOTE — PROGRESS NOTES
Pharmacy Note - Cefepime    2000 mg Cefepime IVPB q 12 h (4 hr infusion) ordered for treatment of Pneumonia (HAP). Per [facility] Renal / Extended Infusion B Lactam Policy, Cefepime will be changed to 2000 mg IVP x 1 to be followed in 12 hours by Cefepime 1000 mg IVPB q 12 h (4 hr infusion). Estimated Creatinine Clearance: Estimated Creatinine Clearance: 11.2 mL/min (A) (based on SCr of 5.57 mg/dL (H)). Dialysis Status, RAYMOND, CKD: Acute Kidney Injury superimposed upon CKD (Stage 4/Stage 5) . BMI:  Body mass index is 31.93 kg/m². Recent Labs     22  0409 10/31/22  0022 10/30/22  0647   WBC 6.1 4.9 4.0     Temp (24hrs), Av °F (37.8 °C), Min:98.8 °F (37.1 °C), Max:101 °F (38.3 °C)      Rationale for Adjustment:  Renal dosing is because drug is renally eliminated. CrCl 11.2 ml/min/ Extended infusion dosing strategy aims to enhance microbiology and clinical efficacy. Pharmacy will continue to monitor and adjust dose as necessary. Please call Inpatient Pharmacy with any questions. Thank you,  Emiliano De Anda MS R. Ph

## 2022-11-01 NOTE — PROGRESS NOTES
Pulmonary Addendum    Rapid COVID test - positive    Decadron has been added  On O2 at 5 L/min  CRP ordered if > 7 may consider baricitinib with renal dosing in patient at elevated risk for decompensation    Norman Soto MD

## 2022-11-02 LAB
ALBUMIN SERPL-MCNC: 2.3 G/DL (ref 3.5–5)
ALBUMIN/GLOB SERPL: 0.6 {RATIO} (ref 1.1–2.2)
ALP SERPL-CCNC: 75 U/L (ref 45–117)
ALT SERPL-CCNC: 14 U/L (ref 12–78)
ANION GAP SERPL CALC-SCNC: 16 MMOL/L (ref 5–15)
AST SERPL-CCNC: 17 U/L (ref 15–37)
BASOPHILS # BLD: 0 K/UL (ref 0–0.1)
BASOPHILS NFR BLD: 0 % (ref 0–1)
BILIRUB SERPL-MCNC: 0.5 MG/DL (ref 0.2–1)
BUN SERPL-MCNC: 43 MG/DL (ref 6–20)
BUN/CREAT SERPL: 7 (ref 12–20)
CALCIUM SERPL-MCNC: 7.6 MG/DL (ref 8.5–10.1)
CHLORIDE SERPL-SCNC: 108 MMOL/L (ref 97–108)
CO2 SERPL-SCNC: 15 MMOL/L (ref 21–32)
COMMENT, HOLDF: NORMAL
CREAT SERPL-MCNC: 5.92 MG/DL (ref 0.55–1.02)
CRP SERPL HS-MCNC: >9.5 MG/L
D DIMER PPP FEU-MCNC: 0.68 MG/L FEU (ref 0–0.65)
DIFFERENTIAL METHOD BLD: ABNORMAL
EOSINOPHIL # BLD: 0 K/UL (ref 0–0.4)
EOSINOPHIL NFR BLD: 0 % (ref 0–7)
ERYTHROCYTE [DISTWIDTH] IN BLOOD BY AUTOMATED COUNT: 14.7 % (ref 11.5–14.5)
ERYTHROCYTE [SEDIMENTATION RATE] IN BLOOD: 106 MM/HR (ref 0–30)
FERRITIN SERPL-MCNC: 1475 NG/ML (ref 8–252)
GLOBULIN SER CALC-MCNC: 3.9 G/DL (ref 2–4)
GLUCOSE BLD STRIP.AUTO-MCNC: 197 MG/DL (ref 65–117)
GLUCOSE BLD STRIP.AUTO-MCNC: 216 MG/DL (ref 65–117)
GLUCOSE BLD STRIP.AUTO-MCNC: 254 MG/DL (ref 65–117)
GLUCOSE BLD STRIP.AUTO-MCNC: 320 MG/DL (ref 65–117)
GLUCOSE SERPL-MCNC: 185 MG/DL (ref 65–100)
HCT VFR BLD AUTO: 22.3 % (ref 35–47)
HGB BLD-MCNC: 7.1 G/DL (ref 11.5–16)
IMM GRANULOCYTES # BLD AUTO: 0 K/UL (ref 0–0.04)
IMM GRANULOCYTES NFR BLD AUTO: 1 % (ref 0–0.5)
LDH SERPL L TO P-CCNC: 381 U/L (ref 81–246)
LYMPHOCYTES # BLD: 0.2 K/UL (ref 0.8–3.5)
LYMPHOCYTES NFR BLD: 5 % (ref 12–49)
MAGNESIUM SERPL-MCNC: 1.8 MG/DL (ref 1.6–2.4)
MCH RBC QN AUTO: 27.4 PG (ref 26–34)
MCHC RBC AUTO-ENTMCNC: 31.8 G/DL (ref 30–36.5)
MCV RBC AUTO: 86.1 FL (ref 80–99)
MONOCYTES # BLD: 0.1 K/UL (ref 0–1)
MONOCYTES NFR BLD: 3 % (ref 5–13)
NEUTS SEG # BLD: 3.7 K/UL (ref 1.8–8)
NEUTS SEG NFR BLD: 91 % (ref 32–75)
NRBC # BLD: 0 K/UL (ref 0–0.01)
NRBC BLD-RTO: 0 PER 100 WBC
PHOSPHATE SERPL-MCNC: 4.1 MG/DL (ref 2.6–4.7)
PLATELET # BLD AUTO: 123 K/UL (ref 150–400)
PMV BLD AUTO: 13 FL (ref 8.9–12.9)
POTASSIUM SERPL-SCNC: 3.9 MMOL/L (ref 3.5–5.1)
PROCALCITONIN SERPL-MCNC: 1.18 NG/ML
PROT SERPL-MCNC: 6.2 G/DL (ref 6.4–8.2)
RBC # BLD AUTO: 2.59 M/UL (ref 3.8–5.2)
RBC MORPH BLD: ABNORMAL
SAMPLES BEING HELD,HOLD: NORMAL
SERVICE CMNT-IMP: ABNORMAL
SODIUM SERPL-SCNC: 139 MMOL/L (ref 136–145)
WBC # BLD AUTO: 4 K/UL (ref 3.6–11)

## 2022-11-02 PROCEDURE — 74011250637 HC RX REV CODE- 250/637: Performed by: PHYSICIAN ASSISTANT

## 2022-11-02 PROCEDURE — 86141 C-REACTIVE PROTEIN HS: CPT

## 2022-11-02 PROCEDURE — 74011000258 HC RX REV CODE- 258: Performed by: INTERNAL MEDICINE

## 2022-11-02 PROCEDURE — 74011250637 HC RX REV CODE- 250/637: Performed by: INTERNAL MEDICINE

## 2022-11-02 PROCEDURE — 36415 COLL VENOUS BLD VENIPUNCTURE: CPT

## 2022-11-02 PROCEDURE — 74011250636 HC RX REV CODE- 250/636: Performed by: INTERNAL MEDICINE

## 2022-11-02 PROCEDURE — 82962 GLUCOSE BLOOD TEST: CPT

## 2022-11-02 PROCEDURE — 84100 ASSAY OF PHOSPHORUS: CPT

## 2022-11-02 PROCEDURE — 74011636637 HC RX REV CODE- 636/637: Performed by: INTERNAL MEDICINE

## 2022-11-02 PROCEDURE — 74011000250 HC RX REV CODE- 250: Performed by: INTERNAL MEDICINE

## 2022-11-02 PROCEDURE — 74011250636 HC RX REV CODE- 250/636: Performed by: PHYSICIAN ASSISTANT

## 2022-11-02 PROCEDURE — 85025 COMPLETE CBC W/AUTO DIFF WBC: CPT

## 2022-11-02 PROCEDURE — 74011636637 HC RX REV CODE- 636/637: Performed by: PHYSICIAN ASSISTANT

## 2022-11-02 PROCEDURE — 82728 ASSAY OF FERRITIN: CPT

## 2022-11-02 PROCEDURE — 83735 ASSAY OF MAGNESIUM: CPT

## 2022-11-02 PROCEDURE — 74011250637 HC RX REV CODE- 250/637: Performed by: FAMILY MEDICINE

## 2022-11-02 PROCEDURE — 84145 PROCALCITONIN (PCT): CPT

## 2022-11-02 PROCEDURE — 65660000001 HC RM ICU INTERMED STEPDOWN

## 2022-11-02 PROCEDURE — 83615 LACTATE (LD) (LDH) ENZYME: CPT

## 2022-11-02 PROCEDURE — 74011000258 HC RX REV CODE- 258: Performed by: PHYSICIAN ASSISTANT

## 2022-11-02 PROCEDURE — 85379 FIBRIN DEGRADATION QUANT: CPT

## 2022-11-02 PROCEDURE — 74011250637 HC RX REV CODE- 250/637: Performed by: STUDENT IN AN ORGANIZED HEALTH CARE EDUCATION/TRAINING PROGRAM

## 2022-11-02 PROCEDURE — 74011250637 HC RX REV CODE- 250/637: Performed by: NURSE PRACTITIONER

## 2022-11-02 PROCEDURE — 85652 RBC SED RATE AUTOMATED: CPT

## 2022-11-02 PROCEDURE — 80053 COMPREHEN METABOLIC PANEL: CPT

## 2022-11-02 RX ORDER — SODIUM BICARBONATE IN D5W 150/1000ML
PLASTIC BAG, INJECTION (ML) INTRAVENOUS CONTINUOUS
Status: DISCONTINUED | OUTPATIENT
Start: 2022-11-02 | End: 2022-11-02 | Stop reason: CLARIF

## 2022-11-02 RX ADMIN — NIFEDIPINE 60 MG: 60 TABLET, EXTENDED RELEASE ORAL at 17:51

## 2022-11-02 RX ADMIN — SODIUM BICARBONATE 650 MG: 650 TABLET ORAL at 08:42

## 2022-11-02 RX ADMIN — BUMETANIDE 2 MG: 1 TABLET ORAL at 08:41

## 2022-11-02 RX ADMIN — GUAIFENESIN 100 MG: 200 SOLUTION ORAL at 12:17

## 2022-11-02 RX ADMIN — Medication 3 UNITS: at 12:29

## 2022-11-02 RX ADMIN — TOCILIZUMAB 648 MG: 180 INJECTION, SOLUTION SUBCUTANEOUS at 12:19

## 2022-11-02 RX ADMIN — HEPARIN SODIUM 5000 UNITS: 5000 INJECTION INTRAVENOUS; SUBCUTANEOUS at 08:41

## 2022-11-02 RX ADMIN — DEXAMETHASONE 6 MG: 4 TABLET ORAL at 08:42

## 2022-11-02 RX ADMIN — HEPARIN SODIUM 5000 UNITS: 5000 INJECTION INTRAVENOUS; SUBCUTANEOUS at 17:51

## 2022-11-02 RX ADMIN — CARVEDILOL 25 MG: 12.5 TABLET, FILM COATED ORAL at 17:51

## 2022-11-02 RX ADMIN — Medication 125 MG: at 07:26

## 2022-11-02 RX ADMIN — NIFEDIPINE 60 MG: 60 TABLET, EXTENDED RELEASE ORAL at 08:42

## 2022-11-02 RX ADMIN — ZOLPIDEM TARTRATE 5 MG: 5 TABLET ORAL at 22:52

## 2022-11-02 RX ADMIN — Medication 5 UNITS: at 18:03

## 2022-11-02 RX ADMIN — CARVEDILOL 25 MG: 12.5 TABLET, FILM COATED ORAL at 08:42

## 2022-11-02 RX ADMIN — CEFEPIME 1 G: 1 INJECTION, POWDER, FOR SOLUTION INTRAMUSCULAR; INTRAVENOUS at 12:18

## 2022-11-02 RX ADMIN — HEPARIN SODIUM 5000 UNITS: 5000 INJECTION INTRAVENOUS; SUBCUTANEOUS at 23:10

## 2022-11-02 RX ADMIN — ATORVASTATIN CALCIUM 40 MG: 40 TABLET, FILM COATED ORAL at 08:40

## 2022-11-02 RX ADMIN — GUAIFENESIN 100 MG: 200 SOLUTION ORAL at 17:51

## 2022-11-02 RX ADMIN — Medication 125 MG: at 23:10

## 2022-11-02 RX ADMIN — ACETAMINOPHEN 1000 MG: 500 TABLET ORAL at 17:51

## 2022-11-02 RX ADMIN — Medication 125 MG: at 17:51

## 2022-11-02 RX ADMIN — Medication 125 MG: at 12:24

## 2022-11-02 RX ADMIN — PANTOPRAZOLE SODIUM 40 MG: 40 TABLET, DELAYED RELEASE ORAL at 07:26

## 2022-11-02 RX ADMIN — INSULIN GLARGINE 12 UNITS: 100 INJECTION, SOLUTION SUBCUTANEOUS at 22:52

## 2022-11-02 RX ADMIN — ACETAMINOPHEN 1000 MG: 500 TABLET ORAL at 08:43

## 2022-11-02 RX ADMIN — SODIUM BICARBONATE: 84 INJECTION, SOLUTION INTRAVENOUS at 17:50

## 2022-11-02 RX ADMIN — Medication 2 UNITS: at 07:26

## 2022-11-02 NOTE — PROGRESS NOTES
Pulmonary    D/w pharmacy and order placed for Actemra 600 mg IV as a one time dose for COVID pneumonia with worsening oxygenation and high CRP  Her renal function precluded her from receiving Bridget Lopez MD

## 2022-11-02 NOTE — PROGRESS NOTES
Pulmonary, Critical Care, and Sleep Medicine~Progress Note    Name: Nora Garcia MRN: [id number]   : 1960 Hospital: [place] [address]   Date: 2022 10:08 AM Admission: 10/25/2022     Impression Plan   Acute hypoxic resp failure in the setting of volume overload  COVID19 +   Presumed bacterial PNA   C diff   RAYMOND on CKD  HTN  DM II Noted decision for actemra  Decadron   Bumex   PO vanc   Cefepime for presumed pna  O2 titration above 90%  Repeat CRP tomorrow   We will get a chest film in the next few days      Daily Progression:    Sitting on the side of the bed  Less BMs  Actually is on room air     I have reviewed the labs and previous days notes. Review of systems not obtained due to patient factors. OBJECTIVE:     Vital Signs:     Visit Vitals  BP (!) 150/65 (BP 1 Location: Left upper arm, BP Patient Position: At rest)   Pulse 69   Temp 99.1 °F (37.3 °C)   Resp 21   Ht 5' 4\" (1.626 m)   Wt 84.4 kg (186 lb)   LMP 10/07/2012   SpO2 99%   BMI 31.93 kg/m²      Temp (24hrs), Av.1 °F (37.3 °C), Min:98.3 °F (36.8 °C), Max:101 °F (38.3 °C)     Intake/Output:     Last shift: No intake/output data recorded.     Last 3 shifts: 10/31 1901 -  0700  In: 550 [I.V.:550]  Out: -         Intake/Output Summary (Last 24 hours) at 2022 1008  Last data filed at 2022 0000  Gross per 24 hour   Intake 550 ml   Output --   Net 550 ml       Physical Exam:                                        Exam Findings Other   General: No resp distress noted, appears stated age    [de-identified]:  No ulcers, JVD not elevated, no cervical LAD    Chest: No pectus deformity, normal chest rise b/l    HEART:  No visible thrills     Lungs:  Normal expansion     ABD: Soft/NT, non rigid mildly distended    EXT: No cyanosis/clubbing/edema, normal peripheral pulses    Skin: No rashes or ulcers, no mottling    Neuro: A/O x 3        Medications:  Current Facility-Administered Medications   Medication Dose Route Frequency    tocilizumab (ACTEMRA) 648 mg in 0.9% sodium chloride 100 mL infusion  648 mg IntraVENous ONCE    pantoprazole (PROTONIX) tablet 40 mg  40 mg Oral ACB    cefepime (MAXIPIME) 1 g in 0.9% sodium chloride (MBP/ADV) 50 mL MBP  1 g IntraVENous Q12H    sodium bicarbonate tablet 650 mg  650 mg Oral TID    Vancomycin - Pharmacy to Dose   Other Rx Dosing/Monitoring    dexAMETHasone (DECADRON) tablet 6 mg  6 mg Oral DAILY    insulin glargine (LANTUS) injection 12 Units  12 Units SubCUTAneous QHS    benzonatate (TESSALON) capsule 100 mg  100 mg Oral TID PRN    guaiFENesin (ROBITUSSIN) 100 mg/5 mL oral liquid 100 mg  100 mg Oral Q4H PRN    pseudoephedrine (SUDAFED) tablet 30 mg  30 mg Oral Q6H PRN    LORazepam (ATIVAN) tablet 0.5 mg  0.5 mg Oral BID PRN    albuterol-ipratropium (DUO-NEB) 2.5 MG-0.5 MG/3 ML  3 mL Nebulization Q6H PRN    acetaminophen (TYLENOL) tablet 1,000 mg  1,000 mg Oral Q6H PRN    bumetanide (BUMEX) tablet 2 mg  2 mg Oral DAILY    insulin lispro (HUMALOG) injection   SubCUTAneous TIDAC    heparin (porcine) injection 5,000 Units  5,000 Units SubCUTAneous Q8H    prochlorperazine (COMPAZINE) tablet 5 mg  5 mg Oral Q6H PRN    Or    prochlorperazine (COMPAZINE) injection 5 mg  5 mg IntraVENous Q6H PRN    NIFEdipine ER (PROCARDIA XL) tablet 60 mg  60 mg Oral BID    melatonin tablet 3 mg  3 mg Oral QHS PRN    zolpidem (AMBIEN) tablet 5 mg  5 mg Oral QHS PRN    ondansetron (ZOFRAN) injection 4 mg  4 mg IntraVENous Q6H PRN    hydrALAZINE (APRESOLINE) 20 mg/mL injection 10 mg  10 mg IntraVENous Q6H PRN    glucose chewable tablet 16 g  4 Tablet Oral PRN    glucagon (GLUCAGEN) injection 1 mg  1 mg IntraMUSCular PRN    dextrose 10 % infusion 0-250 mL  0-250 mL IntraVENous PRN    vancomycin (FIRVANQ) 50 mg/mL oral solution 125 mg  125 mg Oral Q6H    atorvastatin (LIPITOR) tablet 40 mg  40 mg Oral DAILY    carvediloL (COREG) tablet 25 mg  25 mg Oral BID WITH MEALS       Labs:  ABG No results for input(s): PHI, PCO2I, PO2I, HCO3I, SO2I, FIO2I in the last 72 hours.      CBC Recent Labs     11/02/22  0100 11/01/22  0409 10/31/22  0022   WBC 4.0 6.1 4.9   HGB 7.1* 7.6* 7.9*   HCT 22.3* 24.4* 25.8*   * 107* 101*   MCV 86.1 85.3 86.0   MCH 27.4 26.6 18.4        Metabolic  Panel Recent Labs     11/02/22  0100 11/01/22  0409 10/31/22  0022    142 141   K 3.9 3.7 3.6    111* 113*   CO2 15* 21 22   * 46* 73   BUN 43* 39* 34*   CREA 5.92* 5.57* 5.35*   CA 7.6* 7.3* 7.2*   MG 1.8 1.8  --    PHOS 4.1 3.3 2.7   ALB 2.3* 2.7* 2.8*   ALT 14  --   --         Pertinent Labs                Darwin Contreras PA-C  11/2/2022

## 2022-11-02 NOTE — PROGRESS NOTES
[facility]  Hospitalist Group                                                                                          Hospitalist Progress Note  Jericho Walter NP  Answering service: [phone] from in house phone        Date of Service:  2022  NAME:  Marlene Dc  :  1960  MRN:  [id number]      Admission Summary:   Marlene Dc is a 65 y.o. female with Stage IV/V CKD, T2DM (on insulin), GERD, HTN (uncontrolled), and HLD who presented to the ED complaining of severe diarrhea with work-up revealing RAYMOND on CKD, metabolic acidosis, and C Diff + (now on PO Vanc x 10 days). Admission c/b recurrent episodes of morning hypoglycemia and now respiratory distress with rising O2 requirement with CT Scan revealing multifocal PNA and diagnosed COVID-19+ (On Dexamethasone, Pulmonology consulted, broadened to Cefepime+Vancomycin)     Interval history / Subjective:   I saw the patient this morning on rounds. Was bathing herself, sitting on the edge of bed at the moment of the encounter.   Saturations 98 to 100% on room air     Assessment & Plan:     Acute hypoxic respiratory failure  Multifocal PNA, + COVID-19 on 22, viral with possible superimposed bacterial PNA  Did have elevated BNP, received bumetanide  Currently with stable saturations on room air at rest  Procalcitonin elevated, 1.18  Continuing vancomycin  Continuing cefepime  Patient on dexamethasone, will continue with this  Pulmonology has added dose of tocilizumab        Anxiety  Has received small dose of lorazepam x1, which is effective  We will continue with this for now         RAYMOND superimposed on CKD stage IV/V, improving   Metabolic Acidosis  Presented with creatinine 5.33 and BUN 42  Creatinine upward trend, 5.92 today  Nephrology following, appreciate recommendations  Continuing sodium bicarbonate infusion         Acute diarrhea secondary to C Difficile Infection, POA, improving   Diarrhea has resolved  Continuing oral vancomycin         Hypertensive emergency, POA, improving   Systolic blood pressure greater than 180 at presentation, was previously on Cardene infusion, since weaned off  Continuing carvedilol  Continuing nifedipine  She also has as needed hydralazine IV with parameters         Asymptomatic bacteriuria, POA  Urinalysis with bacteria however otherwise unremarkable  Regardless, she is already on antibiotics as above         Chronic normocytic anemia  Hemoglobin stable at baseline    Diabetes mellitus type 2, uncontrolled  Recurrent hypoglycemia in the AM  Sugars better controlled  Hyperglycemia likely related to steroids  Continuing basal bolus insulin regimen      History of CVA  no residuals  Continuing statin    Hyperlipidemia  Stable  Continuing statin     Obesity  BMI of 31  Counseled on healthy dietary choices     Code status:DNR    Prophylaxis: [facility]  Care Plan discussed with: Pt, RN  Anticipated Disposition: Likely home in a few days     Hospital Problems  Date Reviewed: 8/23/2022            Codes Class Noted POA    Diarrhea ICD-10-CM: R19.7  ICD-9-CM: 787.91  10/25/2022 Unknown        RAYMOND (acute kidney injury) [facility]) ICD-10-CM: N17.9  ICD-9-CM: 584.9  10/25/2022 Unknown           Review of Systems:   A comprehensive review of systems was negative except for that written in the HPI. Vital Signs:    Last 24hrs VS reviewed since prior progress note.  Most recent are:  Visit Vitals  BP (!) 132/55   Pulse 66   Temp 98.4 °F (36.9 °C)   Resp 23   Ht 5' 4\" (1.626 m)   Wt 84.4 kg (186 lb)   SpO2 99%   BMI 31.93 kg/m²         Intake/Output Summary (Last 24 hours) at 11/2/2022 0734  Last data filed at 11/2/2022 0000  Gross per 24 hour   Intake 550 ml   Output --   Net 550 ml          Physical Examination:     I had a face to face encounter with this patient and independently examined them on 11/2/2022 as outlined below:    Constitutional:  Tachypneic. 5-6L NC in place   ENT:  Oral mucosa moist.    Resp:  Coarse breath sounds diffusely. No pursed lip breathing. Leaned forward breathing rapidly   CV:  Regular rhythm, normal rate    GI:  Soft, non distended, non tender. Musculoskeletal:  No edema, warm. No pitting edema in BLE    Neurologic:  Moves all extremities spontaneously and ambulating independently. AAOx3                                  Data Review:    Review and/or order of clinical lab test  Review and/or order of tests in the radiology section of [facility]  Review and/or order of tests in the medicine section of [facility]      Labs:     Recent Labs     11/02/22  0100 11/01/22 0409   WBC 4.0 6.1   HGB 7.1* 7.6*   HCT 22.3* 24.4*   * 107*       Recent Labs     11/01/22  0409 10/31/22  0022    141   K 3.7 3.6   * 113*   CO2 21 22   BUN 39* 34*   CREA 5.57* 5.35*   GLU 46* 73   CA 7.3* 7.2*   MG 1.8  --    PHOS 3.3 2.7       Recent Labs     11/01/22  0409 10/31/22  0022   ALB 2.7* 2.8*       No results for input(s): INR, PTP, APTT, INREXT, INREXT in the last 72 hours. Recent Labs     11/01/22  1625   FERR 1,608*        No results found for: FOL, RBCF   No results for input(s): PH, PCO2, PO2 in the last 72 hours. No results for input(s): CPK, CKNDX, TROIQ in the last 72 hours.     No lab exists for component: CPKMB  No results found for: CHOL, CHOLX, CHLST, CHOLV, HDL, HDLP, LDL, LDLC, DLDLP, TGLX, TRIGL, TRIGP, CHHD, CHHDX  Lab Results   Component Value Date/Time    Glucose (POC) 197 (H) 11/02/2022 06:45 AM    Glucose (POC) 187 (H) 11/01/2022 09:07 PM    Glucose (POC) 81 11/01/2022 04:03 PM    Glucose (POC) 141 (H) 11/01/2022 11:23 AM    Glucose (POC) 77 11/01/2022 05:49 AM     Lab Results   Component Value Date/Time    Color YELLOW/STRAW 10/26/2022 05:30 AM    Appearance CLEAR 10/26/2022 05:30 AM    Specific gravity 1.011 10/26/2022 05:30 AM    Specific gravity 1.025 04/19/2013 11:30 AM    pH (UA) 6.0 10/26/2022 05:30 AM    Protein 300 (A) 10/26/2022 05:30 AM    Glucose Negative 10/26/2022 05:30 AM    Ketone Negative 10/26/2022 05:30 AM    Bilirubin Negative 10/26/2022 05:30 AM    Urobilinogen 0.2 10/26/2022 05:30 AM    Nitrites Negative 10/26/2022 05:30 AM    Leukocyte Esterase Negative 10/26/2022 05:30 AM    Epithelial cells FEW 10/26/2022 05:30 AM    Bacteria 1+ (A) 10/26/2022 05:30 AM    WBC 0-4 10/26/2022 05:30 AM    RBC 0-5 10/26/2022 05:30 AM     Imaging:   -- Radiology: CT C/A/P on 11/1:  IMPRESSION     1. Bilateral, diffuse airspace infiltrates consistent with multifocal pneumonia. There are small bilateral pleural effusions. 2. No acute findings in the abdomen or pelvis. 3. Gallstones.     Medications Reviewed:     Current Facility-Administered Medications   Medication Dose Route Frequency    pantoprazole (PROTONIX) tablet 40 mg  40 mg Oral ACB    cefepime (MAXIPIME) 1 g in 0.9% sodium chloride (MBP/ADV) 50 mL MBP  1 g IntraVENous Q12H    sodium bicarbonate tablet 650 mg  650 mg Oral TID    Vancomycin - Pharmacy to Dose   Other Rx Dosing/Monitoring    dexAMETHasone (DECADRON) tablet 6 mg  6 mg Oral DAILY    insulin glargine (LANTUS) injection 12 Units  12 Units SubCUTAneous QHS    benzonatate (TESSALON) capsule 100 mg  100 mg Oral TID PRN    guaiFENesin (ROBITUSSIN) 100 mg/5 mL oral liquid 100 mg  100 mg Oral Q4H PRN    pseudoephedrine (SUDAFED) tablet 30 mg  30 mg Oral Q6H PRN    LORazepam (ATIVAN) tablet 0.5 mg  0.5 mg Oral BID PRN    albuterol-ipratropium (DUO-NEB) 2.5 MG-0.5 MG/3 ML  3 mL Nebulization Q6H PRN    acetaminophen (TYLENOL) tablet 1,000 mg  1,000 mg Oral Q6H PRN    bumetanide (BUMEX) tablet 2 mg  2 mg Oral DAILY    insulin lispro (HUMALOG) injection   SubCUTAneous TIDAC    heparin (porcine) injection 5,000 Units  5,000 Units SubCUTAneous Q8H    prochlorperazine (COMPAZINE) tablet 5 mg  5 mg Oral Q6H PRN    Or    prochlorperazine (COMPAZINE) injection 5 mg  5 mg IntraVENous Q6H PRN    NIFEdipine ER (PROCARDIA XL) tablet 60 mg  60 mg Oral BID    melatonin tablet 3 mg  3 mg Oral QHS PRN    zolpidem (AMBIEN) tablet 5 mg  5 mg Oral QHS PRN    ondansetron (ZOFRAN) injection 4 mg  4 mg IntraVENous Q6H PRN    hydrALAZINE (APRESOLINE) 20 mg/mL injection 10 mg  10 mg IntraVENous Q6H PRN    glucose chewable tablet 16 g  4 Tablet Oral PRN    glucagon (GLUCAGEN) injection 1 mg  1 mg IntraMUSCular PRN    dextrose 10 % infusion 0-250 mL  0-250 mL IntraVENous PRN    vancomycin (FIRVANQ) 50 mg/mL oral solution 125 mg  125 mg Oral Q6H    atorvastatin (LIPITOR) tablet 40 mg  40 mg Oral DAILY    carvediloL (COREG) tablet 25 mg  25 mg Oral BID WITH MEALS     ______________________________________________________________________  EXPECTED LENGTH OF STAY: 3d 2h  ACTUAL LENGTH OF STAY:          8                 Thea Silverman NP

## 2022-11-02 NOTE — PROGRESS NOTES
Renal Progress Note    NAME:  Vinny Jeff   :   1960   MRN:   [id number]     Date/Time:  2022 10:23 AM      Assessment:     Acute Kidney Injury --> sec to vol depletion with GI losses - Scr remains above her baseline  CKD (Stage 4/Stage 5) . Patient is aware of future need for RRT. She was already referred to [facility] educational seminar. C Diff diarrhea, no more diarrhea  Met Acidosis - sec to CKD and partly diarrhea-improved  Hypomagnesemia  HTN-improved  DM2  Acute hypoxic resp failure, due to Covid pneumonia. Acute Covid 19       Plan:     No immediate need for RRT, but patient's renal function may worsen further as it is the case with acute Covid. PPI or H2 blockers for abdominal pain  Change Bumex to oral  Antibiotics per primary team  Adjusted Na Bicarb to 650 MG TID  Replete Mg prn  Encourage oral intake. Avoid NSAIDs + IV contrast.  Dose meds for GFR. BP control  11. Start bicarb drip         Subjective:     10/28 feeling ok, still has diarrhea, C diff+ on po vanco, cr at baseline, Na higher-hypoglycemic this am  10/31 was hypoxic yesterday and SOB, CXR showed pulm edema vs PNA. Has productive cough, febrile. Diarrhea has stopped. Given one dose bumex and started doxy. She feels better today, requires 2 lit O2  22 Patient c/o SOB, cough, sputum production and abdominal pain.       Review of Systems:  Y  N       Y  N  []         []          Fever/chills                                               []         []          Chest Pain  [x]         []          Cough                                                       []         []          Diarrhea   [x]         []          Sputum                                                     []         []          Constipation  [x]         []          SOB/WALLACE                                                []         []          Nausea/Vomit  [x]         []          Abd Pain                                                    [] []          Tolerating PT  []         []          Dysuria                                                      []         []          Tolerating Diet     []        Unable to obtain  ROS due to  []        mental status change  []        sedated   []        intubated    Medications reviewed:  Current Facility-Administered Medications   Medication Dose Route Frequency    tocilizumab (ACTEMRA) 648 mg in 0.9% sodium chloride 100 mL infusion  648 mg IntraVENous ONCE    pantoprazole (PROTONIX) tablet 40 mg  40 mg Oral ACB    cefepime (MAXIPIME) 1 g in 0.9% sodium chloride (MBP/ADV) 50 mL MBP  1 g IntraVENous Q12H    sodium bicarbonate tablet 650 mg  650 mg Oral TID    Vancomycin - Pharmacy to Dose   Other Rx Dosing/Monitoring    dexAMETHasone (DECADRON) tablet 6 mg  6 mg Oral DAILY    insulin glargine (LANTUS) injection 12 Units  12 Units SubCUTAneous QHS    benzonatate (TESSALON) capsule 100 mg  100 mg Oral TID PRN    guaiFENesin (ROBITUSSIN) 100 mg/5 mL oral liquid 100 mg  100 mg Oral Q4H PRN    pseudoephedrine (SUDAFED) tablet 30 mg  30 mg Oral Q6H PRN    LORazepam (ATIVAN) tablet 0.5 mg  0.5 mg Oral BID PRN    albuterol-ipratropium (DUO-NEB) 2.5 MG-0.5 MG/3 ML  3 mL Nebulization Q6H PRN    acetaminophen (TYLENOL) tablet 1,000 mg  1,000 mg Oral Q6H PRN    bumetanide (BUMEX) tablet 2 mg  2 mg Oral DAILY    insulin lispro (HUMALOG) injection   SubCUTAneous TIDAC    heparin (porcine) injection 5,000 Units  5,000 Units SubCUTAneous Q8H    prochlorperazine (COMPAZINE) tablet 5 mg  5 mg Oral Q6H PRN    Or    prochlorperazine (COMPAZINE) injection 5 mg  5 mg IntraVENous Q6H PRN    NIFEdipine ER (PROCARDIA XL) tablet 60 mg  60 mg Oral BID    melatonin tablet 3 mg  3 mg Oral QHS PRN    zolpidem (AMBIEN) tablet 5 mg  5 mg Oral QHS PRN    ondansetron (ZOFRAN) injection 4 mg  4 mg IntraVENous Q6H PRN    hydrALAZINE (APRESOLINE) 20 mg/mL injection 10 mg  10 mg IntraVENous Q6H PRN    glucose chewable tablet 16 g  4 Tablet Oral PRN    glucagon (GLUCAGEN) injection 1 mg  1 mg IntraMUSCular PRN    dextrose 10 % infusion 0-250 mL  0-250 mL IntraVENous PRN    vancomycin (FIRVANQ) 50 mg/mL oral solution 125 mg  125 mg Oral Q6H    atorvastatin (LIPITOR) tablet 40 mg  40 mg Oral DAILY    carvediloL (COREG) tablet 25 mg  25 mg Oral BID WITH MEALS        Objective:   Vitals:  Visit Vitals  BP (!) 150/65 (BP 1 Location: Left upper arm, BP Patient Position: At rest)   Pulse 70   Temp 99.1 °F (37.3 °C)   Resp 21   Ht 5' 4\" (1.626 m)   Wt 84.4 kg (186 lb)   LMP 10/07/2012   SpO2 99%   BMI 31.93 kg/m²     Temp (24hrs), Av.1 °F (37.3 °C), Min:98.3 °F (36.8 °C), Max:101 °F (38.3 °C)    O2 Flow Rate (L/min): 4 l/min O2 Device: Nasal cannula    Last 24hr Input/Output:    Intake/Output Summary (Last 24 hours) at 2022 1031  Last data filed at 2022 0000  Gross per 24 hour   Intake 550 ml   Output --   Net 550 ml          PHYSICAL EXAM:    Seen in CCU 24    General:    Awake and alert, uncomfortable. Eyes:   No icterus    Lungs:   BL crackles    Heart:   RRR, No S 3 gallop, no pericardial rub  . Abdomen:   Not distended. Tender    Extremities: 1+ edema    Psych:  Calm    Neurologic: Responding appropriately.         []        Telemetry Reviewed     []        NSR []        PAC/PVCs   []        Afib  []        Paced   []        NSVT   []        Hough []        NGT  []        Intubated on vent    Lab Data Reviewed:    Recent Results (from the past 24 hour(s))   CULTURE, BLOOD, PAIRED    Collection Time: 22 10:52 AM    Specimen: Blood   Result Value Ref Range    Special Requests: NO SPECIAL REQUESTS      Culture result: NO GROWTH AFTER 18 HOURS     RESPIRATORY VIRUS PANEL W/COVID-19, PCR    Collection Time: 22 10:52 AM    Specimen: Nasopharyngeal   Result Value Ref Range    Adenovirus Not detected NOTD      Coronavirus 229E Not detected NOTD      Coronavirus HKU1 Not detected NOTD      Coronavirus CVNL63 Not detected NOTD Coronavirus OC43 Not detected NOTD      SARS-CoV-2, PCR Detected (A) NOTD      Metapneumovirus Not detected NOTD      Rhinovirus and Enterovirus Not detected NOTD      Influenza A Not detected NOTD      Influenza A, subtype H1 Not detected NOTD      Influenza A, subtype H3 Not detected NOTD      INFLUENZA A H1N1 PCR Not detected NOTD      Influenza B Not detected NOTD      Parainfluenza 1 Not detected NOTD      Parainfluenza 2 Not detected NOTD      Parainfluenza 3 Not detected NOTD      Parainfluenza virus 4 Not detected NOTD      RSV by PCR Not detected NOTD      B. parapertussis, PCR Not detected NOTD      Bordetella pertussis - PCR Not detected NOTD      Chlamydophila pneumoniae DNA, QL, PCR Not detected NOTD      Mycoplasma pneumoniae DNA, QL, PCR Not detected NOTD     GLUCOSE, POC    Collection Time: 11/01/22 11:23 AM   Result Value Ref Range    Glucose (POC) 141 (H) 65 - 117 mg/dL    Performed by [address] [place] [address], RESPIRATORY/SPUTUM/BRONCH Sharyn Krystle STAIN    Collection Time: 11/01/22 11:27 AM    Specimen: Sputum   Result Value Ref Range    Special Requests: NO SPECIAL REQUESTS      GRAM STAIN FEW WBCS SEEN      GRAM STAIN NO EPITHELIAL CELLS SEEN      GRAM STAIN 1+ GRAM NEGATIVE RODS      Culture result: PENDING    GLUCOSE, POC    Collection Time: 11/01/22  4:03 PM   Result Value Ref Range    Glucose (POC) 81 65 - 117 mg/dL    Performed by Patsy Hanks    LD    Collection Time: 11/01/22  4:25 PM   Result Value Ref Range     (H) 81 - 246 U/L   D DIMER    Collection Time: 11/01/22  4:25 PM   Result Value Ref Range    D-dimer 0.79 (H) 0.00 - 0.65 mg/L FEU   FERRITIN    Collection Time: 11/01/22  4:25 PM   Result Value Ref Range    Ferritin 1,608 (H) 8 - 252 NG/ML   SED RATE (ESR)    Collection Time: 11/01/22  4:25 PM   Result Value Ref Range    Sed rate, automated 86 (H) 0 - 30 mm/hr   C REACTIVE PROTEIN, QT    Collection Time: 11/01/22  4:25 PM   Result Value Ref Range    C-Reactive protein 20.20 (H) 0.00 - 0.60 mg/dL   PROCALCITONIN    Collection Time: 11/01/22  4:26 PM   Result Value Ref Range    Procalcitonin 0.63 ng/mL   GLUCOSE, POC    Collection Time: 11/01/22  9:07 PM   Result Value Ref Range    Glucose (POC) 187 (H) 65 - 117 mg/dL    Performed by Giorgi Summers (PCT)    CBC WITH AUTOMATED DIFF    Collection Time: 11/02/22  1:00 AM   Result Value Ref Range    WBC 4.0 3.6 - 11.0 K/uL    RBC 2.59 (L) 3.80 - 5.20 M/uL    HGB 7.1 (L) 11.5 - 16.0 g/dL    HCT 22.3 (L) 35.0 - 47.0 %    MCV 86.1 80.0 - 99.0 FL    MCH 27.4 26.0 - 34.0 PG    MCHC 31.8 30.0 - 36.5 g/dL    RDW 14.7 (H) 11.5 - 14.5 %    PLATELET 052 (L) 968 - 400 K/uL    MPV 13.0 (H) 8.9 - 12.9 FL    NRBC 0.0 0  WBC    ABSOLUTE NRBC 0.00 0.00 - 0.01 K/uL    NEUTROPHILS 91 (H) 32 - 75 %    LYMPHOCYTES 5 (L) 12 - 49 %    MONOCYTES 3 (L) 5 - 13 %    EOSINOPHILS 0 0 - 7 %    BASOPHILS 0 0 - 1 %    IMMATURE GRANULOCYTES 1 (H) 0.0 - 0.5 %    ABS. NEUTROPHILS 3.7 1.8 - 8.0 K/UL    ABS. LYMPHOCYTES 0.2 (L) 0.8 - 3.5 K/UL    ABS. MONOCYTES 0.1 0.0 - 1.0 K/UL    ABS. EOSINOPHILS 0.0 0.0 - 0.4 K/UL    ABS. BASOPHILS 0.0 0.0 - 0.1 K/UL    ABS. IMM.  GRANS. 0.0 0.00 - 0.04 K/UL    DF SMEAR SCANNED      RBC COMMENTS ANISOCYTOSIS  1+        RBC COMMENTS OVALOCYTES  1+        RBC COMMENTS GHISLAINE CELLS  PRESENT        RBC COMMENTS SCHISTOCYTES  PRESENT       MAGNESIUM    Collection Time: 11/02/22  1:00 AM   Result Value Ref Range    Magnesium 1.8 1.6 - 2.4 mg/dL   PHOSPHORUS    Collection Time: 11/02/22  1:00 AM   Result Value Ref Range    Phosphorus 4.1 2.6 - 4.7 MG/DL   SED RATE (ESR)    Collection Time: 11/02/22  1:00 AM   Result Value Ref Range    Sed rate, automated 106 (H) 0 - 30 mm/hr   PROCALCITONIN    Collection Time: 11/02/22  1:00 AM   Result Value Ref Range    Procalcitonin 1.18 ng/mL   D DIMER    Collection Time: 11/02/22  1:00 AM   Result Value Ref Range    D-dimer 0.68 (H) 0.00 - 0.65 mg/L FEU   LD    Collection Time: 11/02/22  1:00 AM   Result Value Ref Range     (H) 81 - 005 U/L   METABOLIC PANEL, COMPREHENSIVE    Collection Time: 11/02/22  1:00 AM   Result Value Ref Range    Sodium 139 136 - 145 mmol/L    Potassium 3.9 3.5 - 5.1 mmol/L    Chloride 108 97 - 108 mmol/L    CO2 15 (LL) 21 - 32 mmol/L    Anion gap 16 (H) 5 - 15 mmol/L    Glucose 185 (H) 65 - 100 mg/dL    BUN 43 (H) 6 - 20 MG/DL    Creatinine 5.92 (H) 0.55 - 1.02 MG/DL    BUN/Creatinine ratio 7 (L) 12 - 20      eGFR 8 (L) >60 ml/min/1.73m2    Calcium 7.6 (L) 8.5 - 10.1 MG/DL    Bilirubin, total 0.5 0.2 - 1.0 MG/DL    ALT (SGPT) 14 12 - 78 U/L    AST (SGOT) 17 15 - 37 U/L    Alk. phosphatase 75 45 - 117 U/L    Protein, total 6.2 (L) 6.4 - 8.2 g/dL    Albumin 2.3 (L) 3.5 - 5.0 g/dL    Globulin 3.9 2.0 - 4.0 g/dL    A-G Ratio 0.6 (L) 1.1 - 2.2     SAMPLES BEING HELD    Collection Time: 11/02/22  1:00 AM   Result Value Ref Range    SAMPLES BEING HELD 1 PST TALL     COMMENT        Add-on orders for these samples will be processed based on acceptable specimen integrity and analyte stability, which may vary by analyte.    GLUCOSE, POC    Collection Time: 11/02/22  6:45 AM   Result Value Ref Range    Glucose (POC) 197 (H) 65 - 117 mg/dL    Performed by Martin Renteria (PCT)        Total time spent with patient:  []        15   []        25   []        35   []         __ minutes    []        Critical Care Provided    Care Plan discussed with:    [x]        Patient   []        Family    []        Care Manager   []        Consultant/Specialist :      []          >50% of visit spent in counseling and coordination of care   (Discussed []        CODE status,  []        Care Plan, []        D/C Planning)    ___________________________________________________    Attending Physician: Hailee Lorenzo MD

## 2022-11-02 NOTE — PROGRESS NOTES
Bedside shift change report given to Donnie Leslie RN (oncoming nurse) by Hoda Jo (offgoing nurse). Report included the following information SBAR, Kardex, Intake/Output, MAR, and Cardiac Rhythm NSR .

## 2022-11-02 NOTE — PROGRESS NOTES
0000 Received patient from [facility].    0730 Bedside shift change report given to Kavon Ragland RN (oncoming nurse) by Abby Murrieta RN. Report included the following information SBAR, Kardex, MAR, Recent Results, and Cardiac Rhythm NSR. Problem: Falls - Risk of  Goal: *Absence of Falls  Description: Document Ana Rosa Ford Fall Risk and appropriate interventions in the flowsheet. Outcome: Progressing Towards Goal  Note: Fall Risk Interventions:  Mobility Interventions: Patient to call before getting OOB         Medication Interventions: Patient to call before getting OOB    Elimination Interventions: Call light in reach              Problem: Risk for Spread of Infection  Goal: Prevent transmission of infectious organism to others  Description: Prevent the transmission of infectious organisms to other patients, staff members, and visitors.   Outcome: Progressing Towards Goal     Problem: Hypertension  Goal: *Blood pressure within specified parameters  Outcome: Progressing Towards Goal  Goal: *Fluid volume balance  Outcome: Progressing Towards Goal  Goal: *Labs within defined limits  Outcome: Progressing Towards Goal

## 2022-11-02 NOTE — PROGRESS NOTES
Patient resting in room. Patient provided self care with soap and basin bath with CHG around lines.  Patient ambulating with assistance

## 2022-11-03 LAB
ALBUMIN SERPL-MCNC: 2.4 G/DL (ref 3.5–5)
ALBUMIN/GLOB SERPL: 0.7 {RATIO} (ref 1.1–2.2)
ALP SERPL-CCNC: 76 U/L (ref 45–117)
ALT SERPL-CCNC: 11 U/L (ref 12–78)
ANION GAP SERPL CALC-SCNC: 11 MMOL/L (ref 5–15)
AST SERPL-CCNC: 12 U/L (ref 15–37)
BASOPHILS # BLD: 0 K/UL (ref 0–0.1)
BASOPHILS NFR BLD: 0 % (ref 0–1)
BILIRUB SERPL-MCNC: 0.5 MG/DL (ref 0.2–1)
BUN SERPL-MCNC: 55 MG/DL (ref 6–20)
BUN/CREAT SERPL: 8 (ref 12–20)
CALCIUM SERPL-MCNC: 7.3 MG/DL (ref 8.5–10.1)
CHLORIDE SERPL-SCNC: 107 MMOL/L (ref 97–108)
CO2 SERPL-SCNC: 18 MMOL/L (ref 21–32)
CREAT SERPL-MCNC: 6.6 MG/DL (ref 0.55–1.02)
CRP SERPL-MCNC: 15.3 MG/DL (ref 0–0.6)
DIFFERENTIAL METHOD BLD: ABNORMAL
EOSINOPHIL # BLD: 0 K/UL (ref 0–0.4)
EOSINOPHIL NFR BLD: 0 % (ref 0–7)
ERYTHROCYTE [DISTWIDTH] IN BLOOD BY AUTOMATED COUNT: 15 % (ref 11.5–14.5)
GLOBULIN SER CALC-MCNC: 3.4 G/DL (ref 2–4)
GLUCOSE BLD STRIP.AUTO-MCNC: 192 MG/DL (ref 65–117)
GLUCOSE BLD STRIP.AUTO-MCNC: 193 MG/DL (ref 65–117)
GLUCOSE BLD STRIP.AUTO-MCNC: 206 MG/DL (ref 65–117)
GLUCOSE BLD STRIP.AUTO-MCNC: 288 MG/DL (ref 65–117)
GLUCOSE SERPL-MCNC: 278 MG/DL (ref 65–100)
HCT VFR BLD AUTO: 22.8 % (ref 35–47)
HGB BLD-MCNC: 7.3 G/DL (ref 11.5–16)
IMM GRANULOCYTES # BLD AUTO: 0.1 K/UL (ref 0–0.04)
IMM GRANULOCYTES NFR BLD AUTO: 1 % (ref 0–0.5)
LYMPHOCYTES # BLD: 0.3 K/UL (ref 0.8–3.5)
LYMPHOCYTES NFR BLD: 6 % (ref 12–49)
MAGNESIUM SERPL-MCNC: 2 MG/DL (ref 1.6–2.4)
MCH RBC QN AUTO: 26.6 PG (ref 26–34)
MCHC RBC AUTO-ENTMCNC: 32 G/DL (ref 30–36.5)
MCV RBC AUTO: 83.2 FL (ref 80–99)
MONOCYTES # BLD: 0.2 K/UL (ref 0–1)
MONOCYTES NFR BLD: 5 % (ref 5–13)
NEUTS SEG # BLD: 4.3 K/UL (ref 1.8–8)
NEUTS SEG NFR BLD: 88 % (ref 32–75)
NRBC # BLD: 0 K/UL (ref 0–0.01)
NRBC BLD-RTO: 0 PER 100 WBC
PHOSPHATE SERPL-MCNC: 4.7 MG/DL (ref 2.6–4.7)
PLATELET # BLD AUTO: 169 K/UL (ref 150–400)
PMV BLD AUTO: 12.9 FL (ref 8.9–12.9)
POTASSIUM SERPL-SCNC: 4.2 MMOL/L (ref 3.5–5.1)
PROT SERPL-MCNC: 5.8 G/DL (ref 6.4–8.2)
RBC # BLD AUTO: 2.74 M/UL (ref 3.8–5.2)
RBC MORPH BLD: ABNORMAL
SERVICE CMNT-IMP: ABNORMAL
SODIUM SERPL-SCNC: 136 MMOL/L (ref 136–145)
VANCOMYCIN SERPL-MCNC: 23.1 UG/ML
WBC # BLD AUTO: 4.9 K/UL (ref 3.6–11)

## 2022-11-03 PROCEDURE — 83735 ASSAY OF MAGNESIUM: CPT

## 2022-11-03 PROCEDURE — 80053 COMPREHEN METABOLIC PANEL: CPT

## 2022-11-03 PROCEDURE — 74011636637 HC RX REV CODE- 636/637: Performed by: NURSE PRACTITIONER

## 2022-11-03 PROCEDURE — 74011250637 HC RX REV CODE- 250/637: Performed by: PHYSICIAN ASSISTANT

## 2022-11-03 PROCEDURE — 65660000001 HC RM ICU INTERMED STEPDOWN

## 2022-11-03 PROCEDURE — 74011250637 HC RX REV CODE- 250/637: Performed by: NURSE PRACTITIONER

## 2022-11-03 PROCEDURE — 74011250637 HC RX REV CODE- 250/637: Performed by: INTERNAL MEDICINE

## 2022-11-03 PROCEDURE — 74011000258 HC RX REV CODE- 258: Performed by: PHYSICIAN ASSISTANT

## 2022-11-03 PROCEDURE — 36415 COLL VENOUS BLD VENIPUNCTURE: CPT

## 2022-11-03 PROCEDURE — 86140 C-REACTIVE PROTEIN: CPT

## 2022-11-03 PROCEDURE — 74011250637 HC RX REV CODE- 250/637: Performed by: STUDENT IN AN ORGANIZED HEALTH CARE EDUCATION/TRAINING PROGRAM

## 2022-11-03 PROCEDURE — 80202 ASSAY OF VANCOMYCIN: CPT

## 2022-11-03 PROCEDURE — 84100 ASSAY OF PHOSPHORUS: CPT

## 2022-11-03 PROCEDURE — 74011250636 HC RX REV CODE- 250/636: Performed by: PHYSICIAN ASSISTANT

## 2022-11-03 PROCEDURE — 74011250637 HC RX REV CODE- 250/637: Performed by: FAMILY MEDICINE

## 2022-11-03 PROCEDURE — 74011636637 HC RX REV CODE- 636/637: Performed by: PHYSICIAN ASSISTANT

## 2022-11-03 PROCEDURE — 85025 COMPLETE CBC W/AUTO DIFF WBC: CPT

## 2022-11-03 PROCEDURE — 82962 GLUCOSE BLOOD TEST: CPT

## 2022-11-03 RX ORDER — INSULIN LISPRO 100 [IU]/ML
INJECTION, SOLUTION INTRAVENOUS; SUBCUTANEOUS
Status: DISCONTINUED | OUTPATIENT
Start: 2022-11-03 | End: 2022-11-09 | Stop reason: HOSPADM

## 2022-11-03 RX ADMIN — CARVEDILOL 25 MG: 12.5 TABLET, FILM COATED ORAL at 08:49

## 2022-11-03 RX ADMIN — HEPARIN SODIUM 5000 UNITS: 5000 INJECTION INTRAVENOUS; SUBCUTANEOUS at 16:16

## 2022-11-03 RX ADMIN — Medication 5 UNITS: at 07:04

## 2022-11-03 RX ADMIN — ZOLPIDEM TARTRATE 5 MG: 5 TABLET ORAL at 22:01

## 2022-11-03 RX ADMIN — PANTOPRAZOLE SODIUM 40 MG: 40 TABLET, DELAYED RELEASE ORAL at 07:04

## 2022-11-03 RX ADMIN — CARVEDILOL 25 MG: 12.5 TABLET, FILM COATED ORAL at 16:15

## 2022-11-03 RX ADMIN — Medication 2 UNITS: at 12:09

## 2022-11-03 RX ADMIN — INSULIN GLARGINE 12 UNITS: 100 INJECTION, SOLUTION SUBCUTANEOUS at 22:01

## 2022-11-03 RX ADMIN — Medication 125 MG: at 19:16

## 2022-11-03 RX ADMIN — Medication 4 UNITS: at 01:30

## 2022-11-03 RX ADMIN — ATORVASTATIN CALCIUM 40 MG: 40 TABLET, FILM COATED ORAL at 08:49

## 2022-11-03 RX ADMIN — BUMETANIDE 2 MG: 1 TABLET ORAL at 08:49

## 2022-11-03 RX ADMIN — LORAZEPAM 0.5 MG: 0.5 TABLET ORAL at 16:14

## 2022-11-03 RX ADMIN — HEPARIN SODIUM 5000 UNITS: 5000 INJECTION INTRAVENOUS; SUBCUTANEOUS at 08:49

## 2022-11-03 RX ADMIN — NIFEDIPINE 60 MG: 60 TABLET, EXTENDED RELEASE ORAL at 08:49

## 2022-11-03 RX ADMIN — DEXAMETHASONE 6 MG: 4 TABLET ORAL at 08:48

## 2022-11-03 RX ADMIN — NIFEDIPINE 60 MG: 60 TABLET, EXTENDED RELEASE ORAL at 19:16

## 2022-11-03 RX ADMIN — Medication 125 MG: at 07:03

## 2022-11-03 RX ADMIN — CEFEPIME 1 G: 1 INJECTION, POWDER, FOR SOLUTION INTRAMUSCULAR; INTRAVENOUS at 12:08

## 2022-11-03 RX ADMIN — Medication 3 UNITS: at 16:14

## 2022-11-03 RX ADMIN — Medication 125 MG: at 12:08

## 2022-11-03 NOTE — PROGRESS NOTES
Problem: Falls - Risk of  Goal: *Absence of Falls  Description: Document Elton Rene Fall Risk and appropriate interventions in the flowsheet. Outcome: Progressing Towards Goal  Note: Fall Risk Interventions:  Mobility Interventions: Bed/chair exit alarm, Patient to call before getting OOB, PT Consult for mobility concerns, Strengthening exercises (ROM-active/passive), Utilize walker, cane, or other assistive device         Medication Interventions: Teach patient to arise slowly, Evaluate medications/consider consulting pharmacy, Bed/chair exit alarm    Elimination Interventions: Bed/chair exit alarm, Call light in reach, Toileting schedule/hourly rounds              Problem: Patient Education: Go to Patient Education Activity  Goal: Patient/Family Education  Outcome: Progressing Towards Goal     Problem: Risk for Spread of Infection  Goal: Prevent transmission of infectious organism to others  Description: Prevent the transmission of infectious organisms to other patients, staff members, and visitors.   Outcome: Progressing Towards Goal     Problem: Hypertension  Goal: *Blood pressure within specified parameters  Outcome: Progressing Towards Goal     Problem: Diarrhea (Adult and Pediatrics)  Goal: *Absence of diarrhea  Outcome: Progressing Towards Goal

## 2022-11-03 NOTE — PROGRESS NOTES
09:49  Sister of patient called and wanted an up date ([id number];  advised the assigned nurse was with a patient and would relay the message. Spoke to assigned RN and advised.   Felicia Hallman

## 2022-11-03 NOTE — PROGRESS NOTES
Pulmonary, Critical Care, and Sleep Medicine~Progress Note    Name: Yasmine Ricks MRN: [id number]   : 1960 Hospital: [facility]   Date: 11/3/2022 10:08 AM Admission: 10/25/2022     Impression Plan   Acute hypoxic resp failure in the setting of volume overload  COVID19 +   Presumed bacterial PNA   C diff   RAYMOND on CKD  HTN  DM II Decadron   Bumex   PO vanc for cdiff  Cefepime for presumed pna  O2 titration above 90%  We will get a chest film tomorrow     Daily Progression:    11/3  On 3L overnight. Rec'd Actemra . CRP 15.3 (down from 20)  Has been ambulating around the room. No new complaints.   Sitting on the side of the bed  Less BMs  Actually is on room air     I have reviewed the labs and previous days notes. Review of systems not obtained due to patient factors. OBJECTIVE:     Vital Signs:     Visit Vitals  BP (!) 140/58   Pulse 63   Temp 98.4 °F (36.9 °C)   Resp 20   Ht 5' 4\" (1.626 m)   Wt 84.4 kg (186 lb)   LMP 10/07/2012   SpO2 98%   BMI 31.93 kg/m²      Temp (24hrs), Av.4 °F (36.9 °C), Min:98.4 °F (36.9 °C), Max:98.4 °F (36.9 °C)     Intake/Output:     Last shift: No intake/output data recorded. Last 3 shifts:  1901 -  0700  In: 1120 [P.O.:520;  I.V.:600]  Out: -         Intake/Output Summary (Last 24 hours) at 11/3/2022 0905  Last data filed at 2022 1534  Gross per 24 hour   Intake 230 ml   Output --   Net 230 ml         Physical Exam:                                        Exam Findings Other   General: No resp distress noted, appears stated age    [de-identified]:  No ulcers, JVD not elevated, no cervical LAD    Chest: No pectus deformity, normal chest rise b/l    HEART:  No visible thrills     Lungs:  Normal expansion     ABD: Soft/NT, non rigid mildly distended    EXT: No cyanosis/clubbing/edema, normal peripheral pulses    Skin: No rashes or ulcers, no mottling    Neuro: A/O x 3 Medications:  Current Facility-Administered Medications   Medication Dose Route Frequency    insulin lispro (HUMALOG) injection   SubCUTAneous AC&HS    sodium bicarbonate (8.4%) 150 mEq in dextrose 5% 1,000 mL infusion   IntraVENous CONTINUOUS    Vancomycin - level due 11/3 with AM labs. RN please draw. Thanks!    Other ONCE    cefepime (MAXIPIME) 1 g in 0.9% sodium chloride (MBP/ADV) 50 mL MBP  1 g IntraVENous Q24H    pantoprazole (PROTONIX) tablet 40 mg  40 mg Oral ACB    Vancomycin - Pharmacy to Dose   Other Rx Dosing/Monitoring    dexAMETHasone (DECADRON) tablet 6 mg  6 mg Oral DAILY    insulin glargine (LANTUS) injection 12 Units  12 Units SubCUTAneous QHS    benzonatate (TESSALON) capsule 100 mg  100 mg Oral TID PRN    guaiFENesin (ROBITUSSIN) 100 mg/5 mL oral liquid 100 mg  100 mg Oral Q4H PRN    pseudoephedrine (SUDAFED) tablet 30 mg  30 mg Oral Q6H PRN    LORazepam (ATIVAN) tablet 0.5 mg  0.5 mg Oral BID PRN    albuterol-ipratropium (DUO-NEB) 2.5 MG-0.5 MG/3 ML  3 mL Nebulization Q6H PRN    acetaminophen (TYLENOL) tablet 1,000 mg  1,000 mg Oral Q6H PRN    bumetanide (BUMEX) tablet 2 mg  2 mg Oral DAILY    heparin (porcine) injection 5,000 Units  5,000 Units SubCUTAneous Q8H    prochlorperazine (COMPAZINE) tablet 5 mg  5 mg Oral Q6H PRN    Or    prochlorperazine (COMPAZINE) injection 5 mg  5 mg IntraVENous Q6H PRN    NIFEdipine ER (PROCARDIA XL) tablet 60 mg  60 mg Oral BID    melatonin tablet 3 mg  3 mg Oral QHS PRN    zolpidem (AMBIEN) tablet 5 mg  5 mg Oral QHS PRN    ondansetron (ZOFRAN) injection 4 mg  4 mg IntraVENous Q6H PRN    hydrALAZINE (APRESOLINE) 20 mg/mL injection 10 mg  10 mg IntraVENous Q6H PRN    glucose chewable tablet 16 g  4 Tablet Oral PRN    glucagon (GLUCAGEN) injection 1 mg  1 mg IntraMUSCular PRN    dextrose 10 % infusion 0-250 mL  0-250 mL IntraVENous PRN    vancomycin (FIRVANQ) 50 mg/mL oral solution 125 mg  125 mg Oral Q6H    atorvastatin (LIPITOR) tablet 40 mg  40 mg Oral DAILY    carvediloL (COREG) tablet 25 mg  25 mg Oral BID WITH MEALS       Labs:  ABG No results for input(s): PHI, PCO2I, PO2I, HCO3I, SO2I, FIO2I in the last 72 hours.      CBC Recent Labs     11/03/22 0115 11/02/22 0100 11/01/22  0409   WBC 4.9 4.0 6.1   HGB 7.3* 7.1* 7.6*   HCT 22.8* 22.3* 24.4*    123* 107*   MCV 83.2 86.1 85.3   MCH 26.6 27.4 47.1          Metabolic  Panel Recent Labs     11/03/22 0115 11/02/22 0100 11/01/22  0409    139 142   K 4.2 3.9 3.7    108 111*   CO2 18* 15* 21   * 185* 46*   BUN 55* 43* 39*   CREA 6.60* 5.92* 5.57*   CA 7.3* 7.6* 7.3*   MG 2.0 1.8 1.8   PHOS 4.7 4.1 3.3   ALB 2.4* 2.3* 2.7*   ALT 11* 14  --           Pertinent Labs                Monico Benson NP  11/3/2022

## 2022-11-03 NOTE — PROGRESS NOTES
Renal Progress Note    NAME:  Leanna Durán   :   1960   MRN:   [id number]     Date/Time:  11/3/2022 10:23 AM      Assessment:     Acute Kidney Injury --> sec to vol depletion with GI losses - Scr remains above her baseline  CKD (Stage 4/Stage 5) . Patient is aware of future need for RRT. She was already referred to [facility] educational seminar. GFR is declining due to Covid. C Diff diarrhea, no more diarrhea  Met Acidosis - sec to CKD and partly diarrhea-improved  Hypomagnesemia  HTN-improved  DM2  Acute hypoxic resp failure, due to Covid pneumonia. Acute Covid 19  10. Severe anxiety. Plan:     No immediate need for RRT, but patient's renal function may worsen further as it is the case with acute Covid. PPI or H2 blockers for abdominal pain  Change Bumex to oral  Antibiotics per primary team  Adjusted Na Bicarb to 650 MG TID  Replete Mg prn  Encourage oral intake. Avoid NSAIDs + IV contrast.  Dose meds for GFR. BP control  11. Continue  bicarb drip         Subjective:     10/28 feeling ok, still has diarrhea, C diff+ on po vanco, cr at baseline, Na higher-hypoglycemic this am  10/31 was hypoxic yesterday and SOB, CXR showed pulm edema vs PNA. Has productive cough, febrile. Diarrhea has stopped. Given one dose bumex and started doxy. She feels better today, requires 2 lit O2  22 Patient c/o SOB, cough, sputum production and abdominal pain. 22 patient wants to go home. She has anxiety flare up. Her SOB is improved.     Review of Systems:  Y  N       Y  N  []         []          Fever/chills                                               []         []          Chest Pain  [x]         []          Cough                                                       []         []          Diarrhea   [x]         []          Sputum                                                     []         []          Constipation  [x]         []          SOB/WALLACE []         []          Nausea/Vomit  [x]         []          Abd Pain                                                    []         []          Tolerating PT  []         []          Dysuria                                                      []         []          Tolerating Diet     []        Unable to obtain  ROS due to  []        mental status change  []        sedated   []        intubated    Medications reviewed:  Current Facility-Administered Medications   Medication Dose Route Frequency    insulin lispro (HUMALOG) injection   SubCUTAneous AC&HS    [START ON 11/4/2022] Vancomycin - level due 11/4 with AM labs. RN please draw. Thanks!    Other ONCE    sodium bicarbonate (8.4%) 150 mEq in dextrose 5% 1,000 mL infusion   IntraVENous CONTINUOUS    cefepime (MAXIPIME) 1 g in 0.9% sodium chloride (MBP/ADV) 50 mL MBP  1 g IntraVENous Q24H    pantoprazole (PROTONIX) tablet 40 mg  40 mg Oral ACB    Vancomycin - Pharmacy to Dose   Other Rx Dosing/Monitoring    dexAMETHasone (DECADRON) tablet 6 mg  6 mg Oral DAILY    insulin glargine (LANTUS) injection 12 Units  12 Units SubCUTAneous QHS    benzonatate (TESSALON) capsule 100 mg  100 mg Oral TID PRN    guaiFENesin (ROBITUSSIN) 100 mg/5 mL oral liquid 100 mg  100 mg Oral Q4H PRN    pseudoephedrine (SUDAFED) tablet 30 mg  30 mg Oral Q6H PRN    LORazepam (ATIVAN) tablet 0.5 mg  0.5 mg Oral BID PRN    albuterol-ipratropium (DUO-NEB) 2.5 MG-0.5 MG/3 ML  3 mL Nebulization Q6H PRN    acetaminophen (TYLENOL) tablet 1,000 mg  1,000 mg Oral Q6H PRN    bumetanide (BUMEX) tablet 2 mg  2 mg Oral DAILY    heparin (porcine) injection 5,000 Units  5,000 Units SubCUTAneous Q8H    prochlorperazine (COMPAZINE) tablet 5 mg  5 mg Oral Q6H PRN    Or    prochlorperazine (COMPAZINE) injection 5 mg  5 mg IntraVENous Q6H PRN    NIFEdipine ER (PROCARDIA XL) tablet 60 mg  60 mg Oral BID    melatonin tablet 3 mg  3 mg Oral QHS PRN    zolpidem (AMBIEN) tablet 5 mg  5 mg Oral QHS PRN ondansetron (ZOFRAN) injection 4 mg  4 mg IntraVENous Q6H PRN    hydrALAZINE (APRESOLINE) 20 mg/mL injection 10 mg  10 mg IntraVENous Q6H PRN    glucose chewable tablet 16 g  4 Tablet Oral PRN    glucagon (GLUCAGEN) injection 1 mg  1 mg IntraMUSCular PRN    dextrose 10 % infusion 0-250 mL  0-250 mL IntraVENous PRN    vancomycin (FIRVANQ) 50 mg/mL oral solution 125 mg  125 mg Oral Q6H    atorvastatin (LIPITOR) tablet 40 mg  40 mg Oral DAILY    carvediloL (COREG) tablet 25 mg  25 mg Oral BID WITH MEALS        Objective:   Vitals:  Visit Vitals  /71 (BP 1 Location: Left upper arm, BP Patient Position: Lying left side)   Pulse 70   Temp 98.1 °F (36.7 °C)   Resp 18   Ht 5' 4\" (1.626 m)   Wt 84.4 kg (186 lb)   LMP 10/07/2012   SpO2 99%   BMI 31.93 kg/m²     Temp (24hrs), Av.3 °F (36.8 °C), Min:98.1 °F (36.7 °C), Max:98.4 °F (36.9 °C)    O2 Flow Rate (L/min): 3 l/min O2 Device: Nasal cannula    Last 24hr Input/Output:    Intake/Output Summary (Last 24 hours) at 11/3/2022 1137  Last data filed at 2022 1534  Gross per 24 hour   Intake 230 ml   Output --   Net 230 ml          PHYSICAL EXAM:    Seen in CCU 24    General:    Awake and alert, uncomfortable. Eyes:   No icterus    Nose               O2 via NC    Lungs:   BL crackles    Heart:   RRR, No S 3 gallop, no pericardial rub  . Abdomen:   Not distended. Tender    Extremities: 1+ edema    Psych:  Calm    Neurologic: Responding appropriately.         []        Telemetry Reviewed     []        NSR []        PAC/PVCs   []        Afib  []        Paced   []        NSVT   []        Hough []        NGT  []        Intubated on vent    Lab Data Reviewed:    Recent Results (from the past 24 hour(s))   GLUCOSE, POC    Collection Time: 22 12:18 PM   Result Value Ref Range    Glucose (POC) 216 (H) 65 - 117 mg/dL    Performed by Jarad Dela Cruz POC    Collection Time: 22  5:55 PM   Result Value Ref Range    Glucose (POC) 254 (H) 65 - 117 mg/dL Performed by Ricarda Palomino    GLUCOSE, POC    Collection Time: 11/02/22 11:08 PM   Result Value Ref Range    Glucose (POC) 320 (H) 65 - 117 mg/dL    Performed by Kenan Talbot RN    CBC WITH AUTOMATED DIFF    Collection Time: 11/03/22  1:15 AM   Result Value Ref Range    WBC 4.9 3.6 - 11.0 K/uL    RBC 2.74 (L) 3.80 - 5.20 M/uL    HGB 7.3 (L) 11.5 - 16.0 g/dL    HCT 22.8 (L) 35.0 - 47.0 %    MCV 83.2 80.0 - 99.0 FL    MCH 26.6 26.0 - 34.0 PG    MCHC 32.0 30.0 - 36.5 g/dL    RDW 15.0 (H) 11.5 - 14.5 %    PLATELET 541 416 - 198 K/uL    MPV 12.9 8.9 - 12.9 FL    NRBC 0.0 0  WBC    ABSOLUTE NRBC 0.00 0.00 - 0.01 K/uL    NEUTROPHILS 88 (H) 32 - 75 %    LYMPHOCYTES 6 (L) 12 - 49 %    MONOCYTES 5 5 - 13 %    EOSINOPHILS 0 0 - 7 %    BASOPHILS 0 0 - 1 %    IMMATURE GRANULOCYTES 1 (H) 0.0 - 0.5 %    ABS. NEUTROPHILS 4.3 1.8 - 8.0 K/UL    ABS. LYMPHOCYTES 0.3 (L) 0.8 - 3.5 K/UL    ABS. MONOCYTES 0.2 0.0 - 1.0 K/UL    ABS. EOSINOPHILS 0.0 0.0 - 0.4 K/UL    ABS. BASOPHILS 0.0 0.0 - 0.1 K/UL    ABS. IMM.  GRANS. 0.1 (H) 0.00 - 0.04 K/UL    DF SMEAR SCANNED      RBC COMMENTS ANISOCYTOSIS  1+        RBC COMMENTS OVALOCYTES  1+        RBC COMMENTS GHISLAINE CELLS  PRESENT       MAGNESIUM    Collection Time: 11/03/22  1:15 AM   Result Value Ref Range    Magnesium 2.0 1.6 - 2.4 mg/dL   PHOSPHORUS    Collection Time: 11/03/22  1:15 AM   Result Value Ref Range    Phosphorus 4.7 2.6 - 4.7 MG/DL   METABOLIC PANEL, COMPREHENSIVE    Collection Time: 11/03/22  1:15 AM   Result Value Ref Range    Sodium 136 136 - 145 mmol/L    Potassium 4.2 3.5 - 5.1 mmol/L    Chloride 107 97 - 108 mmol/L    CO2 18 (L) 21 - 32 mmol/L    Anion gap 11 5 - 15 mmol/L    Glucose 278 (H) 65 - 100 mg/dL    BUN 55 (H) 6 - 20 MG/DL    Creatinine 6.60 (H) 0.55 - 1.02 MG/DL    BUN/Creatinine ratio 8 (L) 12 - 20      eGFR 7 (L) >60 ml/min/1.73m2    Calcium 7.3 (L) 8.5 - 10.1 MG/DL    Bilirubin, total 0.5 0.2 - 1.0 MG/DL    ALT (SGPT) 11 (L) 12 - 78 U/L AST (SGOT) 12 (L) 15 - 37 U/L    Alk.  phosphatase 76 45 - 117 U/L    Protein, total 5.8 (L) 6.4 - 8.2 g/dL    Albumin 2.4 (L) 3.5 - 5.0 g/dL    Globulin 3.4 2.0 - 4.0 g/dL    A-G Ratio 0.7 (L) 1.1 - 2.2     C REACTIVE PROTEIN, QT    Collection Time: 11/03/22  1:15 AM   Result Value Ref Range    C-Reactive protein 15.30 (H) 0.00 - 0.60 mg/dL   VANCOMYCIN, RANDOM    Collection Time: 11/03/22  1:15 AM   Result Value Ref Range    Vancomycin, random 23.1 UG/ML   GLUCOSE, POC    Collection Time: 11/03/22  6:56 AM   Result Value Ref Range    Glucose (POC) 288 (H) 65 - 117 mg/dL    Performed by Saumya Engel RN        Total time spent with patient:  []        15   []        25   []        35   []         __ minutes    []        Critical Care Provided    Care Plan discussed with:    [x]        Patient   []        Family    []        Care Manager   []        Consultant/Specialist :      []          >50% of visit spent in counseling and coordination of care   (Discussed []        CODE status,  []        Care Plan, []        D/C Planning)    ___________________________________________________    Attending Physician: Koffi Singer MD

## 2022-11-03 NOTE — PROGRESS NOTES
[facility]  Hospitalist Group                                                                                          Hospitalist Progress Note  Patricia Mercy   Answering service: [phone] from in house phone        Date of Service:  11/3/2022  NAME:  Marlene Dc  :  1960  MRN:  [id number]      Admission Summary:   Marlene Dc is a 65 y.o. female with Stage IV/V CKD, T2DM (on insulin), GERD, HTN (uncontrolled), and HLD who presented to the ED complaining of severe diarrhea with work-up revealing RAYMOND on CKD, metabolic acidosis, and C Diff + (now on PO Vanc x 10 days). Admission c/b recurrent episodes of morning hypoglycemia and now respiratory distress with rising O2 requirement with CT Scan revealing multifocal PNA and diagnosed COVID-19+ (On Dexamethasone, Pulmonology consulted, broadened to Cefepime+Vancomycin)     Interval history / Subjective: Follow up respiratory failure, renal failure. Patient seen and examined. States has a hard time breathing when lying down. Has anxiety. Creatinine increased. Assessment & Plan:     Acute hypoxic respiratory failure- on 3 liters  Multifocal PNA, + COVID-19 on 22, viral with possible superimposed bacterial PNA  Currently on 3 liters   Procalcitonin elevated, 1.18  Continuing vancomycin. Order MRSA swab.  Discontinue vanc if negative   Continuing cefepime  Patient on dexamethasone, continue   Pulmonology has added dose of tocilizumab  Ambulate as able     RAYMOND superimposed on CKD stage IV/V, persistent  Metabolic Acidosis  Presented with creatinine 5.33 and BUN 42  Creatinine upward trend, 6.6  Nephrology following, appreciate recommendations  Continuing sodium bicarbonate infusion    Anxiety  Lorazepam as needed BID    Acute diarrhea secondary to C Difficile Infection, POA, improving   Diarrhea has resolved  Continuing oral vancomycin    Hypertensive emergency, POA, improving   Systolic blood pressure greater than 180 at presentation, was previously on Cardene infusion, since weaned off  Continuing carvedilol  Continuing nifedipine  She also has as needed hydralazine IV with parameters    Asymptomatic bacteriuria, POA  Urinalysis with bacteria however otherwise unremarkable  Regardless, she is already on antibiotics as above    Chronic normocytic anemia  Hemoglobin stable at baseline    Diabetes mellitus type 2, uncontrolled  Recurrent hypoglycemia in the AM  Sugars better controlled  Hyperglycemia likely related to steroids  Continuing basal bolus insulin regimen      History of CVA  no residuals  Continuing statin    Hyperlipidemia  Stable  Continuing statin     Obesity  BMI of 31  Counseled on healthy dietary choices     Code status:DNR    Prophylaxis: H  Care Plan discussed with: Pt, RN  Anticipated Disposition: >48 hours     Hospital Problems  Date Reviewed: 8/23/2022            Codes Class Noted POA    Diarrhea ICD-10-CM: R19.7  ICD-9-CM: 787.91  10/25/2022 Unknown        RAYMOND (acute kidney injury) [facility]) ICD-10-CM: N17.9  ICD-9-CM: 584.9  10/25/2022 Unknown         Review of Systems:   A comprehensive review of systems was negative except for that written in the HPI. Vital Signs:    Last 24hrs VS reviewed since prior progress note. Most recent are:  Visit Vitals  BP (!) 155/60 (BP 1 Location: Left upper arm, BP Patient Position: Sitting)   Pulse 60   Temp 98.2 °F (36.8 °C)   Resp 18   Ht 5' 4\" (1.626 m)   Wt 84.4 kg (186 lb)   SpO2 100%   BMI 31.93 kg/m²       No intake or output data in the 24 hours ending 11/03/22 1656       Physical Examination:     I had a face to face encounter with this patient and independently examined them on 11/3/2022 as outlined below:    Constitutional:  comfortable, 3 liters NC in place   ENT:  Oral mucosa moist.    Resp:  Diminished, No pursed lip breathing. CV:  Regular rhythm, normal rate    GI:  Soft, non distended, non tender.      Musculoskeletal:  No edema, warm. No pitting edema in BLE    Neurologic:  Moves all extremities spontaneously and ambulating independently. AAOx3                                  Data Review:    Review and/or order of clinical lab test  Review and/or order of tests in the radiology section of CPT  Review and/or order of tests in the medicine section of CPT      Labs:     Recent Labs     11/03/22 0115 11/02/22 0100   WBC 4.9 4.0   HGB 7.3* 7.1*   HCT 22.8* 22.3*    123*       Recent Labs     11/03/22 0115 11/02/22 0100 11/01/22  0409    139 142   K 4.2 3.9 3.7    108 111*   CO2 18* 15* 21   BUN 55* 43* 39*   CREA 6.60* 5.92* 5.57*   * 185* 46*   CA 7.3* 7.6* 7.3*   MG 2.0 1.8 1.8   PHOS 4.7 4.1 3.3       Recent Labs     11/03/22 0115 11/02/22 0100 11/01/22  0409   ALT 11* 14  --    AP 76 75  --    TBILI 0.5 0.5  --    TP 5.8* 6.2*  --    ALB 2.4* 2.3* 2.7*   GLOB 3.4 3.9  --        No results for input(s): INR, PTP, APTT, INREXT, INREXT in the last 72 hours. Recent Labs     11/02/22 0100 11/01/22  1625   FERR 1,475* 1,608*        No results found for: FOL, RBCF   No results for input(s): PH, PCO2, PO2 in the last 72 hours. No results for input(s): CPK, CKNDX, TROIQ in the last 72 hours.     No lab exists for component: CPKMB  No results found for: CHOL, CHOLX, CHLST, CHOLV, HDL, HDLP, LDL, LDLC, DLDLP, TGLX, TRIGL, TRIGP, CHHD, CHHDX  Lab Results   Component Value Date/Time    Glucose (POC) 206 (H) 11/03/2022 03:49 PM    Glucose (POC) 192 (H) 11/03/2022 11:53 AM    Glucose (POC) 288 (H) 11/03/2022 06:56 AM    Glucose (POC) 320 (H) 11/02/2022 11:08 PM    Glucose (POC) 254 (H) 11/02/2022 05:55 PM     Lab Results   Component Value Date/Time    Color YELLOW/STRAW 10/26/2022 05:30 AM    Appearance CLEAR 10/26/2022 05:30 AM    Specific gravity 1.011 10/26/2022 05:30 AM    Specific gravity 1.025 04/19/2013 11:30 AM    pH (UA) 6.0 10/26/2022 05:30 AM    Protein 300 (A) 10/26/2022 05:30 AM    Glucose Negative 10/26/2022 05:30 AM    Ketone Negative 10/26/2022 05:30 AM    Bilirubin Negative 10/26/2022 05:30 AM    Urobilinogen 0.2 10/26/2022 05:30 AM    Nitrites Negative 10/26/2022 05:30 AM    Leukocyte Esterase Negative 10/26/2022 05:30 AM    Epithelial cells FEW 10/26/2022 05:30 AM    Bacteria 1+ (A) 10/26/2022 05:30 AM    WBC 0-4 10/26/2022 05:30 AM    RBC 0-5 10/26/2022 05:30 AM     Imaging:   -- Radiology: CT C/A/P on 11/1:  IMPRESSION     1. Bilateral, diffuse airspace infiltrates consistent with multifocal pneumonia. There are small bilateral pleural effusions. 2. No acute findings in the abdomen or pelvis. 3. Gallstones. Medications Reviewed:     Current Facility-Administered Medications   Medication Dose Route Frequency    insulin lispro (HUMALOG) injection   SubCUTAneous AC&HS    [START ON 11/4/2022] Vancomycin - level due 11/4 with AM labs. RN please draw. Thanks!    Other ONCE    sodium bicarbonate (8.4%) 150 mEq in dextrose 5% 1,000 mL infusion   IntraVENous CONTINUOUS    cefepime (MAXIPIME) 1 g in 0.9% sodium chloride (MBP/ADV) 50 mL MBP  1 g IntraVENous Q24H    pantoprazole (PROTONIX) tablet 40 mg  40 mg Oral ACB    Vancomycin - Pharmacy to Dose   Other Rx Dosing/Monitoring    dexAMETHasone (DECADRON) tablet 6 mg  6 mg Oral DAILY    insulin glargine (LANTUS) injection 12 Units  12 Units SubCUTAneous QHS    benzonatate (TESSALON) capsule 100 mg  100 mg Oral TID PRN    guaiFENesin (ROBITUSSIN) 100 mg/5 mL oral liquid 100 mg  100 mg Oral Q4H PRN    pseudoephedrine (SUDAFED) tablet 30 mg  30 mg Oral Q6H PRN    LORazepam (ATIVAN) tablet 0.5 mg  0.5 mg Oral BID PRN    albuterol-ipratropium (DUO-NEB) 2.5 MG-0.5 MG/3 ML  3 mL Nebulization Q6H PRN    acetaminophen (TYLENOL) tablet 1,000 mg  1,000 mg Oral Q6H PRN    bumetanide (BUMEX) tablet 2 mg  2 mg Oral DAILY    heparin (porcine) injection 5,000 Units  5,000 Units SubCUTAneous Q8H    prochlorperazine (COMPAZINE) tablet 5 mg  5 mg Oral Q6H PRN    Or prochlorperazine (COMPAZINE) injection 5 mg  5 mg IntraVENous Q6H PRN    NIFEdipine ER (PROCARDIA XL) tablet 60 mg  60 mg Oral BID    melatonin tablet 3 mg  3 mg Oral QHS PRN    zolpidem (AMBIEN) tablet 5 mg  5 mg Oral QHS PRN    ondansetron (ZOFRAN) injection 4 mg  4 mg IntraVENous Q6H PRN    hydrALAZINE (APRESOLINE) 20 mg/mL injection 10 mg  10 mg IntraVENous Q6H PRN    glucose chewable tablet 16 g  4 Tablet Oral PRN    glucagon (GLUCAGEN) injection 1 mg  1 mg IntraMUSCular PRN    dextrose 10 % infusion 0-250 mL  0-250 mL IntraVENous PRN    vancomycin (FIRVANQ) 50 mg/mL oral solution 125 mg  125 mg Oral Q6H    atorvastatin (LIPITOR) tablet 40 mg  40 mg Oral DAILY    carvediloL (COREG) tablet 25 mg  25 mg Oral BID WITH MEALS     ______________________________________________________________________  EXPECTED LENGTH OF STAY: 4d 7h  ACTUAL LENGTH OF STAY:          [address] [place] [address],

## 2022-11-03 NOTE — PROGRESS NOTES
Day #3 of Vancomycin  Indication:  HAP  - COVID+  Current regimen:  by levels; 2000mg x1  @ 1744  Abx regimen:  vanc + cefepime + PO vanc (c.diff +)  ID Following ?: NO  Concomitant nephrotoxic drugs (requires more frequent monitoring): Loop diuretics  Frequency of BMP?: daily    Recent Labs     22  0115 22  0100 22  0409   WBC 4.9 4.0 6.1   CREA 6.60* 5.92* 5.57*   BUN 55* 43* 39*     Est CrCl: ~9 ml/min; UO: -- ml/kg/hr  Temp (24hrs), Av.4 °F (36.9 °C), Min:98.4 °F (36.9 °C), Max:98.4 °F (36.9 °C)    Cultures:   10/26 c.diff: positive   blood: NGTD, prelim   sputum: few GNRs, prelim   COVID: positive   resp viral panel: negative     PCT 0.63   PCT 1.18    MRSA Swab ordered (if applicable)? YES - not collected    Goal target range Trough 10-15 mcg/mL    Recent level history:  Date/Time Dose & Interval Measured Level (mcg/mL) Associated AUC/VARSHA Dose Adjustment    11/3 @ 0115 2000mg x1,  @ 1744 23.1 mcg/mL (~32 hrs p dose) -- Hold                                           Plan: Vancomycin level this morning above goal. Redose not yet warranted. Continue current regimen of dosing by levels.  Level tomorrow AM

## 2022-11-04 ENCOUNTER — APPOINTMENT (OUTPATIENT)
Dept: GENERAL RADIOLOGY | Age: 62
DRG: 469 | End: 2022-11-04
Attending: NURSE PRACTITIONER
Payer: MEDICAID

## 2022-11-04 LAB
ALBUMIN SERPL-MCNC: 2.4 G/DL (ref 3.5–5)
ALBUMIN/GLOB SERPL: 0.6 {RATIO} (ref 1.1–2.2)
ALP SERPL-CCNC: 75 U/L (ref 45–117)
ALT SERPL-CCNC: 14 U/L (ref 12–78)
ANION GAP SERPL CALC-SCNC: 10 MMOL/L (ref 5–15)
AST SERPL-CCNC: 16 U/L (ref 15–37)
BACTERIA SPEC CULT: ABNORMAL
BACTERIA SPEC CULT: ABNORMAL
BASOPHILS # BLD: 0 K/UL (ref 0–0.1)
BASOPHILS NFR BLD: 0 % (ref 0–1)
BILIRUB SERPL-MCNC: 0.5 MG/DL (ref 0.2–1)
BUN SERPL-MCNC: 65 MG/DL (ref 6–20)
BUN/CREAT SERPL: 9 (ref 12–20)
CALCIUM SERPL-MCNC: 7.4 MG/DL (ref 8.5–10.1)
CHLORIDE SERPL-SCNC: 105 MMOL/L (ref 97–108)
CO2 SERPL-SCNC: 22 MMOL/L (ref 21–32)
CREAT SERPL-MCNC: 7.27 MG/DL (ref 0.55–1.02)
DIFFERENTIAL METHOD BLD: ABNORMAL
EOSINOPHIL # BLD: 0 K/UL (ref 0–0.4)
EOSINOPHIL NFR BLD: 0 % (ref 0–7)
ERYTHROCYTE [DISTWIDTH] IN BLOOD BY AUTOMATED COUNT: 15 % (ref 11.5–14.5)
GLOBULIN SER CALC-MCNC: 4 G/DL (ref 2–4)
GLUCOSE BLD STRIP.AUTO-MCNC: 242 MG/DL (ref 65–117)
GLUCOSE BLD STRIP.AUTO-MCNC: 300 MG/DL (ref 65–117)
GLUCOSE BLD STRIP.AUTO-MCNC: 338 MG/DL (ref 65–117)
GLUCOSE SERPL-MCNC: 175 MG/DL (ref 65–100)
GRAM STN SPEC: ABNORMAL
HCT VFR BLD AUTO: 24.4 % (ref 35–47)
HGB BLD-MCNC: 7.7 G/DL (ref 11.5–16)
IMM GRANULOCYTES # BLD AUTO: 0.1 K/UL (ref 0–0.04)
IMM GRANULOCYTES NFR BLD AUTO: 1 % (ref 0–0.5)
LYMPHOCYTES # BLD: 0.4 K/UL (ref 0.8–3.5)
LYMPHOCYTES NFR BLD: 5 % (ref 12–49)
MAGNESIUM SERPL-MCNC: 1.8 MG/DL (ref 1.6–2.4)
MCH RBC QN AUTO: 27 PG (ref 26–34)
MCHC RBC AUTO-ENTMCNC: 31.6 G/DL (ref 30–36.5)
MCV RBC AUTO: 85.6 FL (ref 80–99)
MONOCYTES # BLD: 0.5 K/UL (ref 0–1)
MONOCYTES NFR BLD: 6 % (ref 5–13)
NEUTS SEG # BLD: 7 K/UL (ref 1.8–8)
NEUTS SEG NFR BLD: 88 % (ref 32–75)
NRBC # BLD: 0 K/UL (ref 0–0.01)
NRBC BLD-RTO: 0 PER 100 WBC
PHOSPHATE SERPL-MCNC: 4.5 MG/DL (ref 2.6–4.7)
PLATELET # BLD AUTO: 209 K/UL (ref 150–400)
PMV BLD AUTO: 12.2 FL (ref 8.9–12.9)
POTASSIUM SERPL-SCNC: 4 MMOL/L (ref 3.5–5.1)
PROT SERPL-MCNC: 6.4 G/DL (ref 6.4–8.2)
RBC # BLD AUTO: 2.85 M/UL (ref 3.8–5.2)
RBC MORPH BLD: ABNORMAL
SERVICE CMNT-IMP: ABNORMAL
SODIUM SERPL-SCNC: 137 MMOL/L (ref 136–145)
VANCOMYCIN SERPL-MCNC: 20.9 UG/ML
WBC # BLD AUTO: 8 K/UL (ref 3.6–11)

## 2022-11-04 PROCEDURE — 74011636637 HC RX REV CODE- 636/637: Performed by: NURSE PRACTITIONER

## 2022-11-04 PROCEDURE — 74011250637 HC RX REV CODE- 250/637: Performed by: INTERNAL MEDICINE

## 2022-11-04 PROCEDURE — 80202 ASSAY OF VANCOMYCIN: CPT

## 2022-11-04 PROCEDURE — 80053 COMPREHEN METABOLIC PANEL: CPT

## 2022-11-04 PROCEDURE — 74011000250 HC RX REV CODE- 250: Performed by: INTERNAL MEDICINE

## 2022-11-04 PROCEDURE — 74011250637 HC RX REV CODE- 250/637: Performed by: FAMILY MEDICINE

## 2022-11-04 PROCEDURE — 65660000001 HC RM ICU INTERMED STEPDOWN

## 2022-11-04 PROCEDURE — 74011250636 HC RX REV CODE- 250/636: Performed by: PHYSICIAN ASSISTANT

## 2022-11-04 PROCEDURE — 74011250637 HC RX REV CODE- 250/637: Performed by: STUDENT IN AN ORGANIZED HEALTH CARE EDUCATION/TRAINING PROGRAM

## 2022-11-04 PROCEDURE — 83735 ASSAY OF MAGNESIUM: CPT

## 2022-11-04 PROCEDURE — 36415 COLL VENOUS BLD VENIPUNCTURE: CPT

## 2022-11-04 PROCEDURE — 82962 GLUCOSE BLOOD TEST: CPT

## 2022-11-04 PROCEDURE — 74011000258 HC RX REV CODE- 258: Performed by: PHYSICIAN ASSISTANT

## 2022-11-04 PROCEDURE — 84100 ASSAY OF PHOSPHORUS: CPT

## 2022-11-04 PROCEDURE — 74011250637 HC RX REV CODE- 250/637: Performed by: PHYSICIAN ASSISTANT

## 2022-11-04 PROCEDURE — 74011250637 HC RX REV CODE- 250/637: Performed by: NURSE PRACTITIONER

## 2022-11-04 PROCEDURE — 85025 COMPLETE CBC W/AUTO DIFF WBC: CPT

## 2022-11-04 PROCEDURE — 71045 X-RAY EXAM CHEST 1 VIEW: CPT

## 2022-11-04 PROCEDURE — 74011636637 HC RX REV CODE- 636/637: Performed by: INTERNAL MEDICINE

## 2022-11-04 PROCEDURE — 74011250636 HC RX REV CODE- 250/636: Performed by: INTERNAL MEDICINE

## 2022-11-04 RX ORDER — INSULIN GLARGINE 100 [IU]/ML
14 INJECTION, SOLUTION SUBCUTANEOUS
Status: DISCONTINUED | OUTPATIENT
Start: 2022-11-04 | End: 2022-11-05

## 2022-11-04 RX ADMIN — ATORVASTATIN CALCIUM 40 MG: 40 TABLET, FILM COATED ORAL at 09:36

## 2022-11-04 RX ADMIN — HEPARIN SODIUM 5000 UNITS: 5000 INJECTION INTRAVENOUS; SUBCUTANEOUS at 23:31

## 2022-11-04 RX ADMIN — BUMETANIDE 2 MG: 1 TABLET ORAL at 09:35

## 2022-11-04 RX ADMIN — CARVEDILOL 25 MG: 12.5 TABLET, FILM COATED ORAL at 09:35

## 2022-11-04 RX ADMIN — HEPARIN SODIUM 5000 UNITS: 5000 INJECTION INTRAVENOUS; SUBCUTANEOUS at 09:36

## 2022-11-04 RX ADMIN — CEFEPIME 1 G: 1 INJECTION, POWDER, FOR SOLUTION INTRAMUSCULAR; INTRAVENOUS at 12:44

## 2022-11-04 RX ADMIN — Medication 125 MG: at 17:18

## 2022-11-04 RX ADMIN — Medication 7 UNITS: at 17:17

## 2022-11-04 RX ADMIN — SODIUM BICARBONATE: 84 INJECTION, SOLUTION INTRAVENOUS at 12:44

## 2022-11-04 RX ADMIN — Medication 125 MG: at 00:13

## 2022-11-04 RX ADMIN — ZOLPIDEM TARTRATE 5 MG: 5 TABLET ORAL at 21:07

## 2022-11-04 RX ADMIN — GUAIFENESIN 100 MG: 200 SOLUTION ORAL at 09:37

## 2022-11-04 RX ADMIN — DEXAMETHASONE 6 MG: 4 TABLET ORAL at 09:36

## 2022-11-04 RX ADMIN — CARVEDILOL 25 MG: 12.5 TABLET, FILM COATED ORAL at 17:18

## 2022-11-04 RX ADMIN — NIFEDIPINE 60 MG: 60 TABLET, EXTENDED RELEASE ORAL at 09:35

## 2022-11-04 RX ADMIN — HEPARIN SODIUM 5000 UNITS: 5000 INJECTION INTRAVENOUS; SUBCUTANEOUS at 00:13

## 2022-11-04 RX ADMIN — Medication 3 UNITS: at 12:49

## 2022-11-04 RX ADMIN — ACETAMINOPHEN 1000 MG: 500 TABLET ORAL at 17:18

## 2022-11-04 RX ADMIN — Medication 4 UNITS: at 21:11

## 2022-11-04 RX ADMIN — LORAZEPAM 0.5 MG: 0.5 TABLET ORAL at 14:44

## 2022-11-04 RX ADMIN — GUAIFENESIN 100 MG: 200 SOLUTION ORAL at 17:18

## 2022-11-04 RX ADMIN — PANTOPRAZOLE SODIUM 40 MG: 40 TABLET, DELAYED RELEASE ORAL at 06:18

## 2022-11-04 RX ADMIN — ACETAMINOPHEN 1000 MG: 500 TABLET ORAL at 09:35

## 2022-11-04 RX ADMIN — INSULIN GLARGINE 14 UNITS: 100 INJECTION, SOLUTION SUBCUTANEOUS at 21:07

## 2022-11-04 RX ADMIN — Medication 125 MG: at 06:17

## 2022-11-04 RX ADMIN — NIFEDIPINE 60 MG: 60 TABLET, EXTENDED RELEASE ORAL at 17:18

## 2022-11-04 RX ADMIN — Medication 125 MG: at 12:50

## 2022-11-04 RX ADMIN — HEPARIN SODIUM 5000 UNITS: 5000 INJECTION INTRAVENOUS; SUBCUTANEOUS at 17:18

## 2022-11-04 NOTE — PROGRESS NOTES
Pulmonary, Critical Care, and Sleep Medicine~Progress Note    Name: Hector Goss MRN: [id number]   : 1960 [facility]: [facility]   Date: 2022 10:08 AM Admission: 10/25/2022     Impression Plan   Acute hypoxic resp failure in the setting of volume overload  COVID19 +   Presumed bacterial PNA   C diff   RAYMOND on CKD  HTN  DM II Decadron   Bumex   PO vanc for cdiff  Cefepime for presumed pna  O2 titration above 90%  Chest film was never obtained   Follow up chest x-ray in 4-6 wks   We will be available again to see if needed      Daily Progression:      O2 requirements are down     11/3  On 3L overnight. Rec'd Actemra . CRP 15.3 (down from 20)  Has been ambulating around the room. No new complaints.   Sitting on the side of the bed  Less BMs  Actually is on room air     I have reviewed the labs and previous days notes. Review of systems not obtained due to patient factors. OBJECTIVE:     Vital Signs:     Visit Vitals  BP (!) 143/69 (BP 1 Location: Left upper arm, BP Patient Position: Sitting)   Pulse 64   Temp 98.4 °F (36.9 °C)   Resp 20   Ht 5' 4\" (1.626 m)   Wt 84.4 kg (186 lb)   LMP 10/07/2012   SpO2 100%   BMI 31.93 kg/m²      Temp (24hrs), Av.3 °F (36.8 °C), Min:98.2 °F (36.8 °C), Max:98.4 °F (36.9 °C)     Intake/Output:     Last shift:  0701 -  1900  In: 3223.8 [P.O.:360; I.V.:2863.8]  Out: -     Last 3 shifts: No intake/output data recorded.         Intake/Output Summary (Last 24 hours) at 2022 1112  Last data filed at 2022 0801  Gross per 24 hour   Intake 3223.75 ml   Output --   Net 3223.75 ml         Physical Exam:                                        Exam Findings Other   General: No resp distress noted, appears stated age    HEENT:  No ulcers, JVD not elevated, no cervical LAD    Chest: No pectus deformity, normal chest rise b/l    HEART:  No visible thrills Lungs:  Normal expansion     ABD: Soft/NT, non rigid mildly distended    EXT: No cyanosis/clubbing/edema, normal peripheral pulses    Skin: No rashes or ulcers, no mottling    Neuro: A/O x 3        Medications:  Current Facility-Administered Medications   Medication Dose Route Frequency    insulin lispro (HUMALOG) injection   SubCUTAneous AC&HS    sodium bicarbonate (8.4%) 150 mEq in dextrose 5% 1,000 mL infusion   IntraVENous CONTINUOUS    cefepime (MAXIPIME) 1 g in 0.9% sodium chloride (MBP/ADV) 50 mL MBP  1 g IntraVENous Q24H    pantoprazole (PROTONIX) tablet 40 mg  40 mg Oral ACB    dexAMETHasone (DECADRON) tablet 6 mg  6 mg Oral DAILY    insulin glargine (LANTUS) injection 12 Units  12 Units SubCUTAneous QHS    benzonatate (TESSALON) capsule 100 mg  100 mg Oral TID PRN    guaiFENesin (ROBITUSSIN) 100 mg/5 mL oral liquid 100 mg  100 mg Oral Q4H PRN    pseudoephedrine (SUDAFED) tablet 30 mg  30 mg Oral Q6H PRN    LORazepam (ATIVAN) tablet 0.5 mg  0.5 mg Oral BID PRN    albuterol-ipratropium (DUO-NEB) 2.5 MG-0.5 MG/3 ML  3 mL Nebulization Q6H PRN    acetaminophen (TYLENOL) tablet 1,000 mg  1,000 mg Oral Q6H PRN    bumetanide (BUMEX) tablet 2 mg  2 mg Oral DAILY    heparin (porcine) injection 5,000 Units  5,000 Units SubCUTAneous Q8H    prochlorperazine (COMPAZINE) tablet 5 mg  5 mg Oral Q6H PRN    Or    prochlorperazine (COMPAZINE) injection 5 mg  5 mg IntraVENous Q6H PRN    NIFEdipine ER (PROCARDIA XL) tablet 60 mg  60 mg Oral BID    melatonin tablet 3 mg  3 mg Oral QHS PRN    zolpidem (AMBIEN) tablet 5 mg  5 mg Oral QHS PRN    ondansetron (ZOFRAN) injection 4 mg  4 mg IntraVENous Q6H PRN    hydrALAZINE (APRESOLINE) 20 mg/mL injection 10 mg  10 mg IntraVENous Q6H PRN    glucose chewable tablet 16 g  4 Tablet Oral PRN    glucagon (GLUCAGEN) injection 1 mg  1 mg IntraMUSCular PRN    dextrose 10 % infusion 0-250 mL  0-250 mL IntraVENous PRN    vancomycin (FIRVANQ) 50 mg/mL oral solution 125 mg  125 mg Oral Q6H atorvastatin (LIPITOR) tablet 40 mg  40 mg Oral DAILY    carvediloL (COREG) tablet 25 mg  25 mg Oral BID WITH MEALS       Labs:  ABG No results for input(s): PHI, PCO2I, PO2I, HCO3I, SO2I, FIO2I in the last 72 hours.      CBC Recent Labs     11/04/22  0625 11/03/22  0115 11/02/22  0100   WBC 8.0 4.9 4.0   HGB 7.7* 7.3* 7.1*   HCT 24.4* 22.8* 22.3*    169 123*   MCV 85.6 83.2 86.1   MCH 27.0 26.6 64.6          Metabolic  Panel Recent Labs     11/04/22  0625 11/03/22  0115 11/02/22  0100    136 139   K 4.0 4.2 3.9    107 108   CO2 22 18* 15*   * 278* 185*   BUN 65* 55* 43*   CREA 7.27* 6.60* 5.92*   CA 7.4* 7.3* 7.6*   MG 1.8 2.0 1.8   PHOS 4.5 4.7 4.1   ALB 2.4* 2.4* 2.3*   ALT 14 11* 14          Pertinent Labs                Darwin Bazan PA-C  11/4/2022

## 2022-11-04 NOTE — PROGRESS NOTES
Patient up with assistance from nurse using gripper socks for fall prevention. Patient provided self care for hygiene with Chg used around lines.  Patient affect flat and anxious but cooperative

## 2022-11-04 NOTE — PROGRESS NOTES
Bedside shift change report given to Erin Casillas RN (oncoming nurse) by Rylan Zarate (offgoing nurse). Report included the following information SBAR, Kardex, Intake/Output, MAR, and Cardiac Rhythm NSR .

## 2022-11-04 NOTE — PROGRESS NOTES
[address] [facility] Adult  Hospitalist Group                                                                                          Hospitalist Progress Note  Rivka DuncanDO  Answering service: [phone] from in house phone        Date of Service:  2022  NAME:  Shae Freed  :  1960  MRN:  [id number]      Admission Summary:   Shae Freed is a 65 y.o. female with Stage IV/V CKD, T2DM (on insulin), GERD, HTN (uncontrolled), and HLD who presented to the ED complaining of severe diarrhea with work-up revealing RAYMOND on CKD, metabolic acidosis, and C Diff + (now on PO Vanc x 10 days). Admission c/b recurrent episodes of morning hypoglycemia and now respiratory distress with rising O2 requirement with CT Scan revealing multifocal PNA and diagnosed COVID-19+ (On Dexamethasone, Pulmonology consulted, broadened to Cefepime+Vancomycin)     Interval history / Subjective: Follow up respiratory failure, renal failure. Patient seen and examined. Sitting in chair. States respiratory status stable. Creatinine trending up.  Sputum culture growing pseudomonas      Assessment & Plan:     Acute hypoxic respiratory failure- on 3 liters  Multifocal PNA, + COVID-19 on 22, viral with possible superimposed bacterial Pseudomonas Pneumonia  Currently on 3 liters   Procalcitonin elevated, 1.18  Continuing cefepime x 7 days   Patient on dexamethasone  s/p tocilizumab  Ambulate as able     RAYMOND superimposed on CKD stage IV/V, worsening  Metabolic Acidosis  Presented with creatinine 5.33 and BUN 42  Creatinine upward trend, 7.27  Nephrology following, appreciate recommendations  Continuing sodium bicarbonate infusion    Anxiety  Lorazepam as needed BID    Acute diarrhea secondary to C Difficile Infection, POA, improving   Diarrhea has resolved  Continuing oral vancomycin    Hypertensive emergency, POA, improving   Systolic blood pressure greater than 180 at presentation, was previously on Cardene infusion, since weaned off  Continuing carvedilol  Continuing nifedipine  She also has as needed hydralazine IV with parameters    Asymptomatic bacteriuria, POA  Urinalysis with bacteria however otherwise unremarkable  Regardless, she is already on antibiotics as above    Chronic normocytic anemia  Hemoglobin stable at baseline    Diabetes mellitus type 2, uncontrolled  Sugars better controlled  Hyperglycemia likely related to steroids  Increase long acting to 14 units      History of CVA  no residuals  Continuing statin    Hyperlipidemia  Stable  Continuing statin     Obesity  BMI of 31  Counseled on healthy dietary choices     Code status:DNR    Prophylaxis: H  Care Plan discussed with: Pt, RN  Anticipated Disposition: >48 hours     Hospital Problems  Date Reviewed: 8/23/2022            Codes Class Noted POA    Diarrhea ICD-10-CM: R19.7  ICD-9-CM: 787.91  10/25/2022 Unknown        RAYMOND (acute kidney injury) [facility]) ICD-10-CM: N17.9  ICD-9-CM: 584.9  10/25/2022 Unknown         Review of Systems:   A comprehensive review of systems was negative except for that written in the HPI. Vital Signs:    Last 24hrs VS reviewed since prior progress note. Most recent are:  Visit Vitals  /64 (BP 1 Location: Left upper arm)   Pulse 63   Temp 97.9 °F (36.6 °C)   Resp 18   Ht 5' 4\" (1.626 m)   Wt 84.4 kg (186 lb)   SpO2 98%   BMI 31.93 kg/m²         Intake/Output Summary (Last 24 hours) at 11/4/2022 1240  Last data filed at 11/4/2022 0801  Gross per 24 hour   Intake 3223.75 ml   Output --   Net 3223.75 ml          Physical Examination:     I had a face to face encounter with this patient and independently examined them on 11/4/2022 as outlined below:    Constitutional:  comfortable, 3 liters NC in place   ENT:  Oral mucosa moist.    Resp:  Diminished, No pursed lip breathing. CV:  Regular rhythm, normal rate    GI:  Soft, non distended, non tender. Musculoskeletal:  No edema, warm.  Trace edema bilaterally Neurologic:  Moves all extremities spontaneously and ambulating independently. AAOx3                                  Data Review:    Review and/or order of clinical lab test  Review and/or order of tests in the radiology section of CPT  Review and/or order of tests in the medicine section of CPT      Labs:     Recent Labs     11/04/22 0625 11/03/22 0115   WBC 8.0 4.9   HGB 7.7* 7.3*   HCT 24.4* 22.8*    169       Recent Labs     11/04/22 0625 11/03/22 0115 11/02/22  0100    136 139   K 4.0 4.2 3.9    107 108   CO2 22 18* 15*   BUN 65* 55* 43*   CREA 7.27* 6.60* 5.92*   * 278* 185*   CA 7.4* 7.3* 7.6*   MG 1.8 2.0 1.8   PHOS 4.5 4.7 4.1       Recent Labs     11/04/22 0625 11/03/22 0115 11/02/22  0100   ALT 14 11* 14   AP 75 76 75   TBILI 0.5 0.5 0.5   TP 6.4 5.8* 6.2*   ALB 2.4* 2.4* 2.3*   GLOB 4.0 3.4 3.9       No results for input(s): INR, PTP, APTT, INREXT, INREXT in the last 72 hours. Recent Labs     11/02/22 0100 11/01/22  1625   FERR 1,475* 1,608*        No results found for: FOL, RBCF   No results for input(s): PH, PCO2, PO2 in the last 72 hours. No results for input(s): CPK, CKNDX, TROIQ in the last 72 hours.     No lab exists for component: CPKMB  No results found for: CHOL, CHOLX, CHLST, CHOLV, HDL, HDLP, LDL, LDLC, DLDLP, TGLX, TRIGL, TRIGP, CHHD, CHHDX  Lab Results   Component Value Date/Time    Glucose (POC) 242 (H) 11/04/2022 12:15 PM    Glucose (POC) 193 (H) 11/03/2022 09:07 PM    Glucose (POC) 206 (H) 11/03/2022 03:49 PM    Glucose (POC) 192 (H) 11/03/2022 11:53 AM    Glucose (POC) 288 (H) 11/03/2022 06:56 AM     Lab Results   Component Value Date/Time    Color YELLOW/STRAW 10/26/2022 05:30 AM    Appearance CLEAR 10/26/2022 05:30 AM    Specific gravity 1.011 10/26/2022 05:30 AM    Specific gravity 1.025 04/19/2013 11:30 AM    pH (UA) 6.0 10/26/2022 05:30 AM    Protein 300 (A) 10/26/2022 05:30 AM    Glucose Negative 10/26/2022 05:30 AM    Ketone Negative 10/26/2022 05:30 AM    Bilirubin Negative 10/26/2022 05:30 AM    Urobilinogen 0.2 10/26/2022 05:30 AM    Nitrites Negative 10/26/2022 05:30 AM    Leukocyte Esterase Negative 10/26/2022 05:30 AM    Epithelial cells FEW 10/26/2022 05:30 AM    Bacteria 1+ (A) 10/26/2022 05:30 AM    WBC 0-4 10/26/2022 05:30 AM    RBC 0-5 10/26/2022 05:30 AM     Imaging:   -- Radiology: CT C/A/P on 11/1:  IMPRESSION     1. Bilateral, diffuse airspace infiltrates consistent with multifocal pneumonia. There are small bilateral pleural effusions. 2. No acute findings in the abdomen or pelvis. 3. Gallstones.     Medications Reviewed:     Current Facility-Administered Medications   Medication Dose Route Frequency    insulin lispro (HUMALOG) injection   SubCUTAneous AC&HS    sodium bicarbonate (8.4%) 150 mEq in dextrose 5% 1,000 mL infusion   IntraVENous CONTINUOUS    cefepime (MAXIPIME) 1 g in 0.9% sodium chloride (MBP/ADV) 50 mL MBP  1 g IntraVENous Q24H    pantoprazole (PROTONIX) tablet 40 mg  40 mg Oral ACB    dexAMETHasone (DECADRON) tablet 6 mg  6 mg Oral DAILY    insulin glargine (LANTUS) injection 12 Units  12 Units SubCUTAneous QHS    benzonatate (TESSALON) capsule 100 mg  100 mg Oral TID PRN    guaiFENesin (ROBITUSSIN) 100 mg/5 mL oral liquid 100 mg  100 mg Oral Q4H PRN    pseudoephedrine (SUDAFED) tablet 30 mg  30 mg Oral Q6H PRN    LORazepam (ATIVAN) tablet 0.5 mg  0.5 mg Oral BID PRN    albuterol-ipratropium (DUO-NEB) 2.5 MG-0.5 MG/3 ML  3 mL Nebulization Q6H PRN    acetaminophen (TYLENOL) tablet 1,000 mg  1,000 mg Oral Q6H PRN    bumetanide (BUMEX) tablet 2 mg  2 mg Oral DAILY    heparin (porcine) injection 5,000 Units  5,000 Units SubCUTAneous Q8H    prochlorperazine (COMPAZINE) tablet 5 mg  5 mg Oral Q6H PRN    Or    prochlorperazine (COMPAZINE) injection 5 mg  5 mg IntraVENous Q6H PRN    NIFEdipine ER (PROCARDIA XL) tablet 60 mg  60 mg Oral BID    melatonin tablet 3 mg  3 mg Oral QHS PRN    zolpidem (AMBIEN) tablet 5 mg  5 mg Oral QHS PRN    ondansetron (ZOFRAN) injection 4 mg  4 mg IntraVENous Q6H PRN    hydrALAZINE (APRESOLINE) 20 mg/mL injection 10 mg  10 mg IntraVENous Q6H PRN    glucose chewable tablet 16 g  4 Tablet Oral PRN    glucagon (GLUCAGEN) injection 1 mg  1 mg IntraMUSCular PRN    dextrose 10 % infusion 0-250 mL  0-250 mL IntraVENous PRN    vancomycin (FIRVANQ) 50 mg/mL oral solution 125 mg  125 mg Oral Q6H    atorvastatin (LIPITOR) tablet 40 mg  40 mg Oral DAILY    carvediloL (COREG) tablet 25 mg  25 mg Oral BID WITH MEALS     ______________________________________________________________________  EXPECTED LENGTH OF STAY: 4d 7h  ACTUAL LENGTH OF STAY:          [address] [facility],

## 2022-11-04 NOTE — PROGRESS NOTES
Comprehensive Nutrition Assessment    Type and Reason for Visit: Initial, RD nutrition re-screen/LOS    Nutrition Recommendations/Plan:     Regular diet with poor PO intake    Add ONS Glucerna BID, Nepro and Magic Cup each once daily         Malnutrition Assessment:  Malnutrition Status: At risk for malnutrition (specify) (poor PO intake and wt loss, but unclear how much. Fluid not necessarily r/t poor nutrition status.) (11/04/22 5579)    Context:  Acute illness          Nutrition Assessment:    PMHx includes Stage IV/V CKD, T2DM (on insulin), GERD, HTN (uncontrolled), HLD, CVA without residual deficits. Pt admitted with:   RAYMOND superimposed on CKD IV/V, persistent. Cr worsening, no urgent need for RRT, but may require. Diarrhea, C diff+, improving diarrhea/frequency as of 11/4. COVID+, acute respiratory failure in setting of volume overload, PNA. O2 requirements improving. Down to 1 L as of 11/4. Pt screened for LOS. Spoke with pt via phone. She was without overt complaints, but doesn't have a great appetite, consuming <50% of meals. Has had Glucerna in past and receptive to receiving at meals. Denies having menu, but states she doesn't have her glasses and wouldn't be able to read it. Reports softer, less frequent stools. UBW \"190 something\" and unsure if has lost any weight. Currently 186 lb. Admission wt 198 lb, but with volume overload. Still with 2+ bilat LE edema.         Nutritionally Significant Medications:  Lipitor, Bumex, cefepime, Decadron, SSI, protonix, vancomycin, sodium bicarb, Lantus (increasing to 14 units from 12 units)      Estimated Daily Nutrient Needs:  Energy Requirements Based On: Formula  Weight Used for Energy Requirements: Current (84.4 kg)  Energy (kcal/day): 1950 (MSJ x 1.2 x 1.2)  Weight Used for Protein Requirements: Current  Protein (g/day): 70-85 (0.8-1 gm/kg d/t CKD, however if ends up on HD, this needs to be increased)     Fluid (ml/day): 1 mL/kcal    Nutrition Related Findings:   Edema: LLE: 2+ (11/4/2022  8:01 AM)  RLE: 2+ (11/4/2022  8:01 AM)    Last BM: 11/03/22, Formed    Wounds: None      Current Nutrition Therapies:  Diet: regular, 2 gm Na+  Supplements: none at this time  Meal intake: Patient Vitals for the past 168 hrs:   % Diet Eaten   11/04/22 0801 26 - 50%   11/02/22 1534 1 - 25%   11/02/22 1207 1 - 25%   11/02/22 0839 1 - 25%     Supplement intake: No data found. Anthropometric Measures:  Height: 5' 4\" (162.6 cm)  Ideal Body Weight (IBW): 120 lbs (55 kg)  Admission Body Weight: 198 lb 6.6 oz (90 kg)  Current Body Wt:  84.4 kg (186 lb), 155 % IBW. Bed scale  Current BMI (kg/m2): 31.9  Usual Body Weight:  (\"190's\" per pt)     Weight Adjustment: No adjustment                 BMI Category: Obese class 1 (BMI 30.0-34. 9)    Wt Readings from Last 15 Encounters:   10/29/22 84.4 kg (186 lb)   08/23/22 88.3 kg (194 lb 11.2 oz)   03/23/21 79.1 kg (174 lb 6.1 oz)   10/18/17 83.9 kg (185 lb)   10/17/17 81.6 kg (180 lb)   07/17/17 81.6 kg (180 lb)   06/28/17 81.6 kg (180 lb)   06/13/17 81.2 kg (179 lb)   06/08/17 80.6 kg (177 lb 11.1 oz)   04/14/17 83.9 kg (185 lb)   11/18/16 80.3 kg (177 lb)   11/16/16 81.6 kg (179 lb 14.3 oz)   09/22/16 78.5 kg (173 lb)   06/22/16 76.7 kg (169 lb)   05/02/16 78.9 kg (174 lb)         Nutrition Diagnosis:   Inadequate oral intake related to impaired respiratory function, renal dysfunction as evidenced by intake 0-25%, intake 26-50%, weight loss      Nutrition Interventions:   Food and/or Nutrient Delivery: Modify current diet, Start oral nutrition supplement  Nutrition Education/Counseling: No recommendations at this time  Coordination of Nutrition Care: Continue to monitor while inpatient       Goals:     Goals: PO intake 50% or greater, within 7 days, by next RD assessment       Nutrition Monitoring and Evaluation:   Behavioral-Environmental Outcomes: None identified  Food/Nutrient Intake Outcomes: Food and nutrient intake, Supplement intake  Physical Signs/Symptoms Outcomes: Biochemical data, Diarrhea, Fluid status or edema, Hemodynamic status, Meal time behavior, Weight    Discharge Planning:     Too soon to determine    Recent Labs     11/04/22  0625 11/03/22  0115 11/02/22  0100   * 278* 185*   BUN 65* 55* 43*   CREA 7.27* 6.60* 5.92*    136 139   K 4.0 4.2 3.9    107 108   CO2 22 18* 15*   CA 7.4* 7.3* 7.6*   PHOS 4.5 4.7 4.1   MG 1.8 2.0 1.8       Recent Labs     11/04/22  0625 11/03/22  0115 11/02/22  0100   ALT 14 11* 14   AST 16 12* 17   AP 75 76 75   TBILI 0.5 0.5 0.5   TP 6.4 5.8* 6.2*   ALB 2.4* 2.4* 2.3*   GLOB 4.0 3.4 3.9         Recent Labs     11/04/22  0625 11/03/22  0115   WBC 8.0 4.9   HGB 7.7* 7.3*   HCT 24.4* 22.8*    169         Recent Labs     11/04/22  1215 11/03/22  2107 11/03/22  1549 11/03/22  1153 11/03/22  0656 11/02/22  2308 11/02/22  1755 11/02/22  1218 11/02/22  0645 11/01/22  2107 11/01/22  1603   GLUCPOC 242* 193* 206* 192* 288* 320* 254* 216* 197* 187* 81       Lab Results   Component Value Date/Time    Hemoglobin A1c (POC) 9.2 10/17/2017 08:34 AM       Ferritin   Date Value Ref Range Status   11/02/2022 1,475 (H) 8 - 252 NG/ML Final   11/01/2022 1,608 (H) 8 - 252 NG/ML Final         Timur Comes, RD  Available via [email or web address]

## 2022-11-04 NOTE — PROGRESS NOTES
Renal Progress Note    NAME:  Amirah Moffett   :   1960   MRN:   [id number]     Date/Time:  2022 10:23 AM      Assessment:     Acute Kidney Injury --> sec to vol depletion with GI losses - Scr remains above her baseline  CKD (Stage 4/Stage 5) . Patient is aware of future need for RRT. She was already referred to [facility] educational seminar. GFR is declining due to Covid. Scr is now above 7.0  C Diff diarrhea, no more diarrhea  Met Acidosis - sec to CKD and partly diarrhea-improved  Hypomagnesemia  HTN-improved  DM2  Acute hypoxic resp failure, due to Covid pneumonia, the O2 requirements are down   Acute Covid 19  10. Severe anxiety. Plan:     No immediate need for RRT, but patient's renal function may worsen further as it is the case with acute Covid. If Scr is not improving - will discuss again initiation of RRT  PPI or H2 blockers for abdominal pain  Change Bumex to oral  Antibiotics per primary team  Adjusted Na Bicarb to 650 MG TID  Replete Mg prn  Encourage oral intake. Avoid NSAIDs + IV contrast.  Dose meds for GFR. BP control  11. Continue  bicarb drip for now         Subjective:     10/28 feeling ok, still has diarrhea, C diff+ on po vanco, cr at baseline, Na higher-hypoglycemic this am  10/31 was hypoxic yesterday and SOB, CXR showed pulm edema vs PNA. Has productive cough, febrile. Diarrhea has stopped. Given one dose bumex and started doxy. She feels better today, requires 2 lit O2  22 Patient c/o SOB, cough, sputum production and abdominal pain. 22 patient wants to go home. She has anxiety flare up. Her SOB is improved.     Review of Systems:  Y  N       Y  N  []         []          Fever/chills                                               []         []          Chest Pain  [x]         []          Cough                                                       []         []          Diarrhea   [x]         []          Sputum []         []          Constipation  [x]         []          SOB/WALLACE                                                []         []          Nausea/Vomit  [x]         []          Abd Pain                                                    []         []          Tolerating PT  []         []          Dysuria                                                      []         []          Tolerating Diet     []        Unable to obtain  ROS due to  []        mental status change  []        sedated   []        intubated    Medications reviewed:  Current Facility-Administered Medications   Medication Dose Route Frequency    insulin lispro (HUMALOG) injection   SubCUTAneous AC&HS    sodium bicarbonate (8.4%) 150 mEq in dextrose 5% 1,000 mL infusion   IntraVENous CONTINUOUS    cefepime (MAXIPIME) 1 g in 0.9% sodium chloride (MBP/ADV) 50 mL MBP  1 g IntraVENous Q24H    pantoprazole (PROTONIX) tablet 40 mg  40 mg Oral ACB    dexAMETHasone (DECADRON) tablet 6 mg  6 mg Oral DAILY    insulin glargine (LANTUS) injection 12 Units  12 Units SubCUTAneous QHS    benzonatate (TESSALON) capsule 100 mg  100 mg Oral TID PRN    guaiFENesin (ROBITUSSIN) 100 mg/5 mL oral liquid 100 mg  100 mg Oral Q4H PRN    pseudoephedrine (SUDAFED) tablet 30 mg  30 mg Oral Q6H PRN    LORazepam (ATIVAN) tablet 0.5 mg  0.5 mg Oral BID PRN    albuterol-ipratropium (DUO-NEB) 2.5 MG-0.5 MG/3 ML  3 mL Nebulization Q6H PRN    acetaminophen (TYLENOL) tablet 1,000 mg  1,000 mg Oral Q6H PRN    bumetanide (BUMEX) tablet 2 mg  2 mg Oral DAILY    heparin (porcine) injection 5,000 Units  5,000 Units SubCUTAneous Q8H    prochlorperazine (COMPAZINE) tablet 5 mg  5 mg Oral Q6H PRN    Or    prochlorperazine (COMPAZINE) injection 5 mg  5 mg IntraVENous Q6H PRN    NIFEdipine ER (PROCARDIA XL) tablet 60 mg  60 mg Oral BID    melatonin tablet 3 mg  3 mg Oral QHS PRN    zolpidem (AMBIEN) tablet 5 mg  5 mg Oral QHS PRN    ondansetron (ZOFRAN) injection 4 mg  4 mg IntraVENous Q6H PRN    hydrALAZINE (APRESOLINE) 20 mg/mL injection 10 mg  10 mg IntraVENous Q6H PRN    glucose chewable tablet 16 g  4 Tablet Oral PRN    glucagon (GLUCAGEN) injection 1 mg  1 mg IntraMUSCular PRN    dextrose 10 % infusion 0-250 mL  0-250 mL IntraVENous PRN    vancomycin (FIRVANQ) 50 mg/mL oral solution 125 mg  125 mg Oral Q6H    atorvastatin (LIPITOR) tablet 40 mg  40 mg Oral DAILY    carvediloL (COREG) tablet 25 mg  25 mg Oral BID WITH MEALS        Objective:   Vitals:  Visit Vitals  /64 (BP 1 Location: Left upper arm)   Pulse 63   Temp 97.9 °F (36.6 °C)   Resp 18   Ht 5' 4\" (1.626 m)   Wt 84.4 kg (186 lb)   LMP 10/07/2012   SpO2 98%   BMI 31.93 kg/m²     Temp (24hrs), Av.2 °F (36.8 °C), Min:97.9 °F (36.6 °C), Max:98.4 °F (36.9 °C)    O2 Flow Rate (L/min): 1 l/min O2 Device: Nasal cannula    Last 24hr Input/Output:    Intake/Output Summary (Last 24 hours) at 2022 1238  Last data filed at 2022 0801  Gross per 24 hour   Intake 3223.75 ml   Output --   Net 3223.75 ml          PHYSICAL EXAM:    Seen in CCU 24    General:    Awake and alert, uncomfortable. Eyes:   No icterus    Nose               O2 via NC    Lungs:   BL crackles    Heart:   RRR, No S 3 gallop, no pericardial rub  . Abdomen:   Not distended. Tender    Extremities: 1+ edema    Psych:  Calm    Neurologic: Responding appropriately.         []        Telemetry Reviewed     []        NSR []        PAC/PVCs   []        Afib  []        Paced   []        NSVT   []        Hough []        NGT  []        Intubated on vent    Lab Data Reviewed:    Recent Results (from the past 24 hour(s))   GLUCOSE, POC    Collection Time: 22  3:49 PM   Result Value Ref Range    Glucose (POC) 206 (H) 65 - 117 mg/dL    Performed by Arnold Tolliver (CON)    GLUCOSE, POC    Collection Time: 22  9:07 PM   Result Value Ref Range    Glucose (POC) 193 (H) 65 - 117 mg/dL    Performed by Giorgi Summers (PCT)    CBC WITH AUTOMATED DIFF Collection Time: 11/04/22  6:25 AM   Result Value Ref Range    WBC 8.0 3.6 - 11.0 K/uL    RBC 2.85 (L) 3.80 - 5.20 M/uL    HGB 7.7 (L) 11.5 - 16.0 g/dL    HCT 24.4 (L) 35.0 - 47.0 %    MCV 85.6 80.0 - 99.0 FL    MCH 27.0 26.0 - 34.0 PG    MCHC 31.6 30.0 - 36.5 g/dL    RDW 15.0 (H) 11.5 - 14.5 %    PLATELET 101 167 - 064 K/uL    MPV 12.2 8.9 - 12.9 FL    NRBC 0.0 0  WBC    ABSOLUTE NRBC 0.00 0.00 - 0.01 K/uL    NEUTROPHILS 88 (H) 32 - 75 %    LYMPHOCYTES 5 (L) 12 - 49 %    MONOCYTES 6 5 - 13 %    EOSINOPHILS 0 0 - 7 %    BASOPHILS 0 0 - 1 %    IMMATURE GRANULOCYTES 1 (H) 0.0 - 0.5 %    ABS. NEUTROPHILS 7.0 1.8 - 8.0 K/UL    ABS. LYMPHOCYTES 0.4 (L) 0.8 - 3.5 K/UL    ABS. MONOCYTES 0.5 0.0 - 1.0 K/UL    ABS. EOSINOPHILS 0.0 0.0 - 0.4 K/UL    ABS. BASOPHILS 0.0 0.0 - 0.1 K/UL    ABS. IMM. GRANS. 0.1 (H) 0.00 - 0.04 K/UL    DF SMEAR SCANNED      RBC COMMENTS ANISOCYTOSIS  1+        RBC COMMENTS GHISLAINE CELLS  PRESENT        RBC COMMENTS OVALOCYTES  PRESENT       MAGNESIUM    Collection Time: 11/04/22  6:25 AM   Result Value Ref Range    Magnesium 1.8 1.6 - 2.4 mg/dL   PHOSPHORUS    Collection Time: 11/04/22  6:25 AM   Result Value Ref Range    Phosphorus 4.5 2.6 - 4.7 MG/DL   METABOLIC PANEL, COMPREHENSIVE    Collection Time: 11/04/22  6:25 AM   Result Value Ref Range    Sodium 137 136 - 145 mmol/L    Potassium 4.0 3.5 - 5.1 mmol/L    Chloride 105 97 - 108 mmol/L    CO2 22 21 - 32 mmol/L    Anion gap 10 5 - 15 mmol/L    Glucose 175 (H) 65 - 100 mg/dL    BUN 65 (H) 6 - 20 MG/DL    Creatinine 7.27 (H) 0.55 - 1.02 MG/DL    BUN/Creatinine ratio 9 (L) 12 - 20      eGFR 6 (L) >60 ml/min/1.73m2    Calcium 7.4 (L) 8.5 - 10.1 MG/DL    Bilirubin, total 0.5 0.2 - 1.0 MG/DL    ALT (SGPT) 14 12 - 78 U/L    AST (SGOT) 16 15 - 37 U/L    Alk.  phosphatase 75 45 - 117 U/L    Protein, total 6.4 6.4 - 8.2 g/dL    Albumin 2.4 (L) 3.5 - 5.0 g/dL    Globulin 4.0 2.0 - 4.0 g/dL    A-G Ratio 0.6 (L) 1.1 - 2.2     VANCOMYCIN, RANDOM Collection Time: 11/04/22  6:25 AM   Result Value Ref Range    Vancomycin, random 20.9 UG/ML   GLUCOSE, POC    Collection Time: 11/04/22 12:15 PM   Result Value Ref Range    Glucose (POC) 242 (H) 65 - 117 mg/dL    Performed by Verónica Tsang        Total time spent with patient:  []        15   []        25   []        35   []         __ minutes    []        Critical Care Provided    Care Plan discussed with:    [x]        Patient   []        Family    []        Care Manager   []        Consultant/Specialist :      []          >50% of visit spent in counseling and coordination of care   (Discussed []        CODE status,  []        Care Plan, []        D/C Planning)    ___________________________________________________    Attending Physician: Karen Andino MD

## 2022-11-04 NOTE — PROGRESS NOTES
RACHAEL:  RUR: 12%  LOW    Disposition:  Home with family when medically stable    Pulmonary, Nephrology following. CM continuing to follow. Barriers to d/c:elevated creatinine, IV abx and steroids. SYLVIA  Wed 11/9/22. CM continuing to follow.   Sabrina Blount, BSW, CRM

## 2022-11-05 LAB
ALBUMIN SERPL-MCNC: 2.5 G/DL (ref 3.5–5)
ALBUMIN/GLOB SERPL: 0.7 {RATIO} (ref 1.1–2.2)
ALP SERPL-CCNC: 71 U/L (ref 45–117)
ALT SERPL-CCNC: 12 U/L (ref 12–78)
ANION GAP SERPL CALC-SCNC: 10 MMOL/L (ref 5–15)
AST SERPL-CCNC: 15 U/L (ref 15–37)
BASOPHILS # BLD: 0 K/UL (ref 0–0.1)
BASOPHILS NFR BLD: 0 % (ref 0–1)
BILIRUB SERPL-MCNC: 0.5 MG/DL (ref 0.2–1)
BUN SERPL-MCNC: 73 MG/DL (ref 6–20)
BUN/CREAT SERPL: 10 (ref 12–20)
CALCIUM SERPL-MCNC: 6.7 MG/DL (ref 8.5–10.1)
CHLORIDE SERPL-SCNC: 104 MMOL/L (ref 97–108)
CO2 SERPL-SCNC: 24 MMOL/L (ref 21–32)
CREAT SERPL-MCNC: 7.2 MG/DL (ref 0.55–1.02)
DIFFERENTIAL METHOD BLD: ABNORMAL
EOSINOPHIL # BLD: 0 K/UL (ref 0–0.4)
EOSINOPHIL NFR BLD: 0 % (ref 0–7)
ERYTHROCYTE [DISTWIDTH] IN BLOOD BY AUTOMATED COUNT: 14.9 % (ref 11.5–14.5)
GLOBULIN SER CALC-MCNC: 3.8 G/DL (ref 2–4)
GLUCOSE BLD STRIP.AUTO-MCNC: 183 MG/DL (ref 65–117)
GLUCOSE BLD STRIP.AUTO-MCNC: 203 MG/DL (ref 65–117)
GLUCOSE BLD STRIP.AUTO-MCNC: 210 MG/DL (ref 65–117)
GLUCOSE BLD STRIP.AUTO-MCNC: 253 MG/DL (ref 65–117)
GLUCOSE SERPL-MCNC: 144 MG/DL (ref 65–100)
HCT VFR BLD AUTO: 25.1 % (ref 35–47)
HGB BLD-MCNC: 7.9 G/DL (ref 11.5–16)
IMM GRANULOCYTES # BLD AUTO: 0.1 K/UL (ref 0–0.04)
IMM GRANULOCYTES NFR BLD AUTO: 1 % (ref 0–0.5)
LYMPHOCYTES # BLD: 0.5 K/UL (ref 0.8–3.5)
LYMPHOCYTES NFR BLD: 6 % (ref 12–49)
MCH RBC QN AUTO: 26.8 PG (ref 26–34)
MCHC RBC AUTO-ENTMCNC: 31.5 G/DL (ref 30–36.5)
MCV RBC AUTO: 85.1 FL (ref 80–99)
MONOCYTES # BLD: 0.5 K/UL (ref 0–1)
MONOCYTES NFR BLD: 6 % (ref 5–13)
NEUTS SEG # BLD: 7.1 K/UL (ref 1.8–8)
NEUTS SEG NFR BLD: 87 % (ref 32–75)
NRBC # BLD: 0 K/UL (ref 0–0.01)
NRBC BLD-RTO: 0 PER 100 WBC
PLATELET # BLD AUTO: 225 K/UL (ref 150–400)
PMV BLD AUTO: 11.6 FL (ref 8.9–12.9)
POTASSIUM SERPL-SCNC: 3.7 MMOL/L (ref 3.5–5.1)
PROT SERPL-MCNC: 6.3 G/DL (ref 6.4–8.2)
RBC # BLD AUTO: 2.95 M/UL (ref 3.8–5.2)
RBC MORPH BLD: ABNORMAL
SERVICE CMNT-IMP: ABNORMAL
SODIUM SERPL-SCNC: 138 MMOL/L (ref 136–145)
WBC # BLD AUTO: 8.2 K/UL (ref 3.6–11)

## 2022-11-05 PROCEDURE — 74011250636 HC RX REV CODE- 250/636: Performed by: PHYSICIAN ASSISTANT

## 2022-11-05 PROCEDURE — 85025 COMPLETE CBC W/AUTO DIFF WBC: CPT

## 2022-11-05 PROCEDURE — 65660000001 HC RM ICU INTERMED STEPDOWN

## 2022-11-05 PROCEDURE — 74011250637 HC RX REV CODE- 250/637: Performed by: INTERNAL MEDICINE

## 2022-11-05 PROCEDURE — 74011000258 HC RX REV CODE- 258: Performed by: PHYSICIAN ASSISTANT

## 2022-11-05 PROCEDURE — 74011250637 HC RX REV CODE- 250/637: Performed by: PHYSICIAN ASSISTANT

## 2022-11-05 PROCEDURE — 82962 GLUCOSE BLOOD TEST: CPT

## 2022-11-05 PROCEDURE — 36415 COLL VENOUS BLD VENIPUNCTURE: CPT

## 2022-11-05 PROCEDURE — 74011250637 HC RX REV CODE- 250/637: Performed by: STUDENT IN AN ORGANIZED HEALTH CARE EDUCATION/TRAINING PROGRAM

## 2022-11-05 PROCEDURE — 74011636637 HC RX REV CODE- 636/637: Performed by: INTERNAL MEDICINE

## 2022-11-05 PROCEDURE — 74011636637 HC RX REV CODE- 636/637: Performed by: NURSE PRACTITIONER

## 2022-11-05 PROCEDURE — 80053 COMPREHEN METABOLIC PANEL: CPT

## 2022-11-05 PROCEDURE — 74011250637 HC RX REV CODE- 250/637: Performed by: FAMILY MEDICINE

## 2022-11-05 RX ORDER — INSULIN LISPRO 100 [IU]/ML
4 INJECTION, SOLUTION INTRAVENOUS; SUBCUTANEOUS
Status: DISCONTINUED | OUTPATIENT
Start: 2022-11-05 | End: 2022-11-09 | Stop reason: HOSPADM

## 2022-11-05 RX ORDER — INSULIN GLARGINE 100 [IU]/ML
18 INJECTION, SOLUTION SUBCUTANEOUS
Status: DISCONTINUED | OUTPATIENT
Start: 2022-11-05 | End: 2022-11-09 | Stop reason: HOSPADM

## 2022-11-05 RX ADMIN — HEPARIN SODIUM 5000 UNITS: 5000 INJECTION INTRAVENOUS; SUBCUTANEOUS at 23:57

## 2022-11-05 RX ADMIN — Medication 125 MG: at 13:04

## 2022-11-05 RX ADMIN — NIFEDIPINE 60 MG: 60 TABLET, EXTENDED RELEASE ORAL at 18:03

## 2022-11-05 RX ADMIN — HEPARIN SODIUM 5000 UNITS: 5000 INJECTION INTRAVENOUS; SUBCUTANEOUS at 18:03

## 2022-11-05 RX ADMIN — Medication 125 MG: at 18:58

## 2022-11-05 RX ADMIN — DEXAMETHASONE 6 MG: 4 TABLET ORAL at 08:28

## 2022-11-05 RX ADMIN — CARVEDILOL 25 MG: 12.5 TABLET, FILM COATED ORAL at 18:03

## 2022-11-05 RX ADMIN — INSULIN GLARGINE 18 UNITS: 100 INJECTION, SOLUTION SUBCUTANEOUS at 22:02

## 2022-11-05 RX ADMIN — NIFEDIPINE 60 MG: 60 TABLET, EXTENDED RELEASE ORAL at 08:29

## 2022-11-05 RX ADMIN — HEPARIN SODIUM 5000 UNITS: 5000 INJECTION INTRAVENOUS; SUBCUTANEOUS at 08:29

## 2022-11-05 RX ADMIN — Medication 3 UNITS: at 18:02

## 2022-11-05 RX ADMIN — ACETAMINOPHEN 1000 MG: 500 TABLET ORAL at 13:16

## 2022-11-05 RX ADMIN — Medication 3 UNITS: at 05:17

## 2022-11-05 RX ADMIN — Medication 4 UNITS: at 18:03

## 2022-11-05 RX ADMIN — LORAZEPAM 0.5 MG: 0.5 TABLET ORAL at 05:17

## 2022-11-05 RX ADMIN — Medication 125 MG: at 01:30

## 2022-11-05 RX ADMIN — Medication 4 UNITS: at 13:04

## 2022-11-05 RX ADMIN — LORAZEPAM 0.5 MG: 0.5 TABLET ORAL at 20:34

## 2022-11-05 RX ADMIN — PANTOPRAZOLE SODIUM 40 MG: 40 TABLET, DELAYED RELEASE ORAL at 05:17

## 2022-11-05 RX ADMIN — CEFEPIME 1 G: 1 INJECTION, POWDER, FOR SOLUTION INTRAMUSCULAR; INTRAVENOUS at 13:04

## 2022-11-05 RX ADMIN — BUMETANIDE 2 MG: 1 TABLET ORAL at 08:29

## 2022-11-05 RX ADMIN — Medication 2 UNITS: at 13:04

## 2022-11-05 RX ADMIN — Medication 2 UNITS: at 22:03

## 2022-11-05 RX ADMIN — ATORVASTATIN CALCIUM 40 MG: 40 TABLET, FILM COATED ORAL at 08:29

## 2022-11-05 RX ADMIN — CARVEDILOL 25 MG: 12.5 TABLET, FILM COATED ORAL at 08:29

## 2022-11-05 RX ADMIN — Medication 125 MG: at 05:06

## 2022-11-05 NOTE — PROGRESS NOTES
[address] Adult  Hospitalist Group                                                                                          Hospitalist Progress Note  [address] [facility],   Answering service: [phone] from in house phone        Date of Service:  2022  NAME:  Chana Severance  :  1960  MRN:  [id number]      Admission Summary:   Chana Severance is a 65 y.o. female with Stage IV/V CKD, T2DM (on insulin), GERD, HTN (uncontrolled), and HLD who presented to the ED complaining of severe diarrhea with work-up revealing RAYMOND on CKD, metabolic acidosis, and C Diff + (now on PO Vanc x 10 days). Admission c/b recurrent episodes of morning hypoglycemia and now respiratory distress with rising O2 requirement with CT Scan revealing multifocal PNA and diagnosed COVID-19+ (On Dexamethasone, Pulmonology consulted, broadened to Cefepime+Vancomycin)     Interval history / Subjective: Follow up respiratory failure, renal failure. Patient seen and examined. Takes shallow breaths. Overall no complaints. Pending renal labs today.       Assessment & Plan:     Acute hypoxic respiratory failure- on 1 liters  Multifocal PNA, + COVID-19 on 22, viral with possible superimposed bacterial Pseudomonas Pneumonia  Currently on 1 liters  (more for comfort)  Continuing cefepime x 7 days   Continue dexamethasone  s/p tocilizumab  Ambulate as able, incentive spirometer    RAYMOND superimposed on CKD stage IV/V, worsening  Metabolic Acidosis  Presented with creatinine 5.33 and BUN 42  Creatinine upward trend, 7.27  Nephrology following, appreciate recommendations  Continuing sodium bicarbonate infusion    Anxiety  Lorazepam as needed BID    Acute diarrhea secondary to C Difficile Infection, POA, improving   Diarrhea has resolved  Continuing oral vancomycin- final dose 2022    Hypertensive emergency, POA, improving   Systolic blood pressure greater than 180 at presentation, was previously on Cardene infusion, since weaned off  Continuing carvedilol  Continuing nifedipine  She also has as needed hydralazine IV with parameters    Asymptomatic bacteriuria, POA  Urinalysis with bacteria however otherwise unremarkable  Regardless, she is already on antibiotics as above    Chronic normocytic anemia  Hemoglobin stable at baseline    Diabetes mellitus type 2, uncontrolled  Sugars better controlled  Hyperglycemia likely related to steroids  Increase long acting to 18 units qhs  Add meal time, continue SSI     History of CVA  no residuals  Continuing statin    Hyperlipidemia  Stable  Continuing statin     Obesity  BMI of 31  Counseled on healthy dietary choices     Code status:DNR    Prophylaxis: H  Care Plan discussed with: Pt, RN  Anticipated Disposition: >48 hours     Hospital Problems  Date Reviewed: 8/23/2022            Codes Class Noted POA    Diarrhea ICD-10-CM: R19.7  ICD-9-CM: 787.91  10/25/2022 Unknown        RAYMOND (acute kidney injury) [facility]) ICD-10-CM: N17.9  ICD-9-CM: 584.9  10/25/2022 Unknown       Review of Systems:   A comprehensive review of systems was negative except for that written in the HPI. Vital Signs:    Last 24hrs VS reviewed since prior progress note. Most recent are:  Visit Vitals  BP (!) 153/65 (BP 1 Location: Left upper arm, BP Patient Position: At rest)   Pulse 73   Temp 98.5 °F (36.9 °C)   Resp 28   Ht 5' 4\" (1.626 m)   Wt 85 kg (187 lb 6.3 oz)   SpO2 94%   BMI 32.17 kg/m²         Intake/Output Summary (Last 24 hours) at 11/5/2022 1004  Last data filed at 11/5/2022 0503  Gross per 24 hour   Intake 513.25 ml   Output --   Net 513.25 ml          Physical Examination:     I had a face to face encounter with this patient and independently examined them on 11/5/2022 as outlined below:    Constitutional:  comfortable, 1 liters NC in place   ENT:  Oral mucosa moist.    Resp:  Diminished bilaterally with faint crackles, appears unable to take deep breath, No pursed lip breathing.    CV: Regular rhythm, normal rate    GI:  Soft, non distended, non tender. Musculoskeletal:  No edema, warm. Trace edema bilaterally     Neurologic:  Moves all extremities spontaneously and ambulating independently. AAOx3                                  Data Review:    Review and/or order of clinical lab test  Review and/or order of tests in the radiology section of CPT  Review and/or order of tests in the medicine section of CPT      Labs:     Recent Labs     11/05/22  0507 11/04/22 0625   WBC 8.2 8.0   HGB 7.9* 7.7*   HCT 25.1* 24.4*    209       Recent Labs     11/04/22  0625 11/03/22  0115    136   K 4.0 4.2    107   CO2 22 18*   BUN 65* 55*   CREA 7.27* 6.60*   * 278*   CA 7.4* 7.3*   MG 1.8 2.0   PHOS 4.5 4.7       Recent Labs     11/04/22  0625 11/03/22  0115   ALT 14 11*   AP 75 76   TBILI 0.5 0.5   TP 6.4 5.8*   ALB 2.4* 2.4*   GLOB 4.0 3.4       No results for input(s): INR, PTP, APTT, INREXT, INREXT in the last 72 hours. No results for input(s): FE, TIBC, PSAT, FERR in the last 72 hours. No results found for: FOL, RBCF   No results for input(s): PH, PCO2, PO2 in the last 72 hours. No results for input(s): CPK, CKNDX, TROIQ in the last 72 hours.     No lab exists for component: CPKMB  No results found for: CHOL, CHOLX, CHLST, CHOLV, HDL, HDLP, LDL, LDLC, DLDLP, TGLX, TRIGL, TRIGP, CHHD, CHHDX  Lab Results   Component Value Date/Time    Glucose (POC) 253 (H) 11/05/2022 05:02 AM    Glucose (POC) 338 (H) 11/04/2022 09:07 PM    Glucose (POC) 300 (H) 11/04/2022 04:48 PM    Glucose (POC) 242 (H) 11/04/2022 12:15 PM    Glucose (POC) 193 (H) 11/03/2022 09:07 PM     Lab Results   Component Value Date/Time    Color YELLOW/STRAW 10/26/2022 05:30 AM    Appearance CLEAR 10/26/2022 05:30 AM    Specific gravity 1.011 10/26/2022 05:30 AM    Specific gravity 1.025 04/19/2013 11:30 AM    pH (UA) 6.0 10/26/2022 05:30 AM    Protein 300 (A) 10/26/2022 05:30 AM    Glucose Negative 10/26/2022 05:30 AM    Ketone Negative 10/26/2022 05:30 AM    Bilirubin Negative 10/26/2022 05:30 AM    Urobilinogen 0.2 10/26/2022 05:30 AM    Nitrites Negative 10/26/2022 05:30 AM    Leukocyte Esterase Negative 10/26/2022 05:30 AM    Epithelial cells FEW 10/26/2022 05:30 AM    Bacteria 1+ (A) 10/26/2022 05:30 AM    WBC 0-4 10/26/2022 05:30 AM    RBC 0-5 10/26/2022 05:30 AM     Imaging:   -- Radiology: CT C/A/P on 11/1:  IMPRESSION     1. Bilateral, diffuse airspace infiltrates consistent with multifocal pneumonia. There are small bilateral pleural effusions. 2. No acute findings in the abdomen or pelvis. 3. Gallstones.     Medications Reviewed:     Current Facility-Administered Medications   Medication Dose Route Frequency    insulin glargine (LANTUS) injection 14 Units  14 Units SubCUTAneous QHS    insulin lispro (HUMALOG) injection   SubCUTAneous AC&HS    sodium bicarbonate (8.4%) 150 mEq in dextrose 5% 1,000 mL infusion   IntraVENous CONTINUOUS    cefepime (MAXIPIME) 1 g in 0.9% sodium chloride (MBP/ADV) 50 mL MBP  1 g IntraVENous Q24H    pantoprazole (PROTONIX) tablet 40 mg  40 mg Oral ACB    dexAMETHasone (DECADRON) tablet 6 mg  6 mg Oral DAILY    benzonatate (TESSALON) capsule 100 mg  100 mg Oral TID PRN    guaiFENesin (ROBITUSSIN) 100 mg/5 mL oral liquid 100 mg  100 mg Oral Q4H PRN    pseudoephedrine (SUDAFED) tablet 30 mg  30 mg Oral Q6H PRN    LORazepam (ATIVAN) tablet 0.5 mg  0.5 mg Oral BID PRN    albuterol-ipratropium (DUO-NEB) 2.5 MG-0.5 MG/3 ML  3 mL Nebulization Q6H PRN    acetaminophen (TYLENOL) tablet 1,000 mg  1,000 mg Oral Q6H PRN    bumetanide (BUMEX) tablet 2 mg  2 mg Oral DAILY    heparin (porcine) injection 5,000 Units  5,000 Units SubCUTAneous Q8H    prochlorperazine (COMPAZINE) tablet 5 mg  5 mg Oral Q6H PRN    Or    prochlorperazine (COMPAZINE) injection 5 mg  5 mg IntraVENous Q6H PRN    NIFEdipine ER (PROCARDIA XL) tablet 60 mg  60 mg Oral BID    melatonin tablet 3 mg  3 mg Oral QHS PRN zolpidem (AMBIEN) tablet 5 mg  5 mg Oral QHS PRN    ondansetron (ZOFRAN) injection 4 mg  4 mg IntraVENous Q6H PRN    hydrALAZINE (APRESOLINE) 20 mg/mL injection 10 mg  10 mg IntraVENous Q6H PRN    glucose chewable tablet 16 g  4 Tablet Oral PRN    glucagon (GLUCAGEN) injection 1 mg  1 mg IntraMUSCular PRN    dextrose 10 % infusion 0-250 mL  0-250 mL IntraVENous PRN    vancomycin (FIRVANQ) 50 mg/mL oral solution 125 mg  125 mg Oral Q6H    atorvastatin (LIPITOR) tablet 40 mg  40 mg Oral DAILY    carvediloL (COREG) tablet 25 mg  25 mg Oral BID WITH MEALS     ______________________________________________________________________  EXPECTED LENGTH OF STAY: 4d 7h  ACTUAL LENGTH OF STAY:          [address] ASHWINI DO

## 2022-11-05 NOTE — PROGRESS NOTES
Problem: Falls - Risk of  Goal: *Absence of Falls  Description: Document Lettie Duverney Fall Risk and appropriate interventions in the flowsheet. Outcome: Progressing Towards Goal  Note: Fall Risk Interventions:  Mobility Interventions: Patient to call before getting OOB         Medication Interventions: Bed/chair exit alarm    Elimination Interventions: Call light in reach              Problem: Patient Education: Go to Patient Education Activity  Goal: Patient/Family Education  Outcome: Progressing Towards Goal     Problem: Risk for Spread of Infection  Goal: Prevent transmission of infectious organism to others  Description: Prevent the transmission of infectious organisms to other patients, staff members, and visitors.   Outcome: Progressing Towards Goal     Problem: Patient Education:  Go to Education Activity  Goal: Patient/Family Education  Outcome: Progressing Towards Goal     Problem: Patient Education: Go to Patient Education Activity  Goal: Patient/Family Education  Outcome: Progressing Towards Goal     Problem: Diarrhea (Adult and Pediatrics)  Goal: *Absence of diarrhea  Outcome: Progressing Towards Goal  Goal: *PALLIATIVE CARE:  Absence of diarrhea  Outcome: Progressing Towards Goal     Problem: Patient Education: Go to Patient Education Activity  Goal: Patient/Family Education  Outcome: Progressing Towards Goal

## 2022-11-06 LAB
ALBUMIN SERPL-MCNC: 2.5 G/DL (ref 3.5–5)
ALBUMIN/GLOB SERPL: 0.7 {RATIO} (ref 1.1–2.2)
ALP SERPL-CCNC: 75 U/L (ref 45–117)
ALT SERPL-CCNC: 14 U/L (ref 12–78)
ANION GAP SERPL CALC-SCNC: 8 MMOL/L (ref 5–15)
AST SERPL-CCNC: 13 U/L (ref 15–37)
BASOPHILS # BLD: 0 K/UL (ref 0–0.1)
BASOPHILS NFR BLD: 0 % (ref 0–1)
BILIRUB SERPL-MCNC: 0.4 MG/DL (ref 0.2–1)
BUN SERPL-MCNC: 76 MG/DL (ref 6–20)
BUN/CREAT SERPL: 11 (ref 12–20)
CALCIUM SERPL-MCNC: 6.4 MG/DL (ref 8.5–10.1)
CHLORIDE SERPL-SCNC: 106 MMOL/L (ref 97–108)
CO2 SERPL-SCNC: 24 MMOL/L (ref 21–32)
CREAT SERPL-MCNC: 6.93 MG/DL (ref 0.55–1.02)
DIFFERENTIAL METHOD BLD: ABNORMAL
EOSINOPHIL # BLD: 0 K/UL (ref 0–0.4)
EOSINOPHIL NFR BLD: 0 % (ref 0–7)
ERYTHROCYTE [DISTWIDTH] IN BLOOD BY AUTOMATED COUNT: 14.9 % (ref 11.5–14.5)
GLOBULIN SER CALC-MCNC: 3.4 G/DL (ref 2–4)
GLUCOSE BLD STRIP.AUTO-MCNC: 144 MG/DL (ref 65–117)
GLUCOSE BLD STRIP.AUTO-MCNC: 173 MG/DL (ref 65–117)
GLUCOSE BLD STRIP.AUTO-MCNC: 247 MG/DL (ref 65–117)
GLUCOSE BLD STRIP.AUTO-MCNC: 250 MG/DL (ref 65–117)
GLUCOSE SERPL-MCNC: 124 MG/DL (ref 65–100)
HCT VFR BLD AUTO: 23.8 % (ref 35–47)
HGB BLD-MCNC: 7.6 G/DL (ref 11.5–16)
IMM GRANULOCYTES # BLD AUTO: 0.2 K/UL (ref 0–0.04)
IMM GRANULOCYTES NFR BLD AUTO: 2 % (ref 0–0.5)
LYMPHOCYTES # BLD: 0.6 K/UL (ref 0.8–3.5)
LYMPHOCYTES NFR BLD: 8 % (ref 12–49)
MAGNESIUM SERPL-MCNC: 1.4 MG/DL (ref 1.6–2.4)
MCH RBC QN AUTO: 26.2 PG (ref 26–34)
MCHC RBC AUTO-ENTMCNC: 31.9 G/DL (ref 30–36.5)
MCV RBC AUTO: 82.1 FL (ref 80–99)
MONOCYTES # BLD: 0.5 K/UL (ref 0–1)
MONOCYTES NFR BLD: 6 % (ref 5–13)
NEUTS SEG # BLD: 6.5 K/UL (ref 1.8–8)
NEUTS SEG NFR BLD: 84 % (ref 32–75)
NRBC # BLD: 0 K/UL (ref 0–0.01)
NRBC BLD-RTO: 0 PER 100 WBC
PHOSPHATE SERPL-MCNC: 3.8 MG/DL (ref 2.6–4.7)
PLATELET # BLD AUTO: 221 K/UL (ref 150–400)
PMV BLD AUTO: 12.6 FL (ref 8.9–12.9)
POTASSIUM SERPL-SCNC: 3.8 MMOL/L (ref 3.5–5.1)
PROT SERPL-MCNC: 5.9 G/DL (ref 6.4–8.2)
RBC # BLD AUTO: 2.9 M/UL (ref 3.8–5.2)
RBC MORPH BLD: ABNORMAL
RBC MORPH BLD: ABNORMAL
SERVICE CMNT-IMP: ABNORMAL
SODIUM SERPL-SCNC: 138 MMOL/L (ref 136–145)
WBC # BLD AUTO: 7.8 K/UL (ref 3.6–11)

## 2022-11-06 PROCEDURE — 65660000001 HC RM ICU INTERMED STEPDOWN

## 2022-11-06 PROCEDURE — 74011636637 HC RX REV CODE- 636/637: Performed by: INTERNAL MEDICINE

## 2022-11-06 PROCEDURE — 74011250636 HC RX REV CODE- 250/636: Performed by: INTERNAL MEDICINE

## 2022-11-06 PROCEDURE — 74011250637 HC RX REV CODE- 250/637: Performed by: FAMILY MEDICINE

## 2022-11-06 PROCEDURE — 82962 GLUCOSE BLOOD TEST: CPT

## 2022-11-06 PROCEDURE — 74011250637 HC RX REV CODE- 250/637: Performed by: INTERNAL MEDICINE

## 2022-11-06 PROCEDURE — 36415 COLL VENOUS BLD VENIPUNCTURE: CPT

## 2022-11-06 PROCEDURE — 74011250637 HC RX REV CODE- 250/637: Performed by: PHYSICIAN ASSISTANT

## 2022-11-06 PROCEDURE — 74011250637 HC RX REV CODE- 250/637: Performed by: STUDENT IN AN ORGANIZED HEALTH CARE EDUCATION/TRAINING PROGRAM

## 2022-11-06 PROCEDURE — 74011000250 HC RX REV CODE- 250: Performed by: INTERNAL MEDICINE

## 2022-11-06 PROCEDURE — 74011250636 HC RX REV CODE- 250/636: Performed by: PHYSICIAN ASSISTANT

## 2022-11-06 PROCEDURE — 74011000258 HC RX REV CODE- 258: Performed by: INTERNAL MEDICINE

## 2022-11-06 PROCEDURE — 80053 COMPREHEN METABOLIC PANEL: CPT

## 2022-11-06 PROCEDURE — 74011636637 HC RX REV CODE- 636/637: Performed by: NURSE PRACTITIONER

## 2022-11-06 PROCEDURE — 83735 ASSAY OF MAGNESIUM: CPT

## 2022-11-06 PROCEDURE — 85025 COMPLETE CBC W/AUTO DIFF WBC: CPT

## 2022-11-06 PROCEDURE — 74011250637 HC RX REV CODE- 250/637: Performed by: NURSE PRACTITIONER

## 2022-11-06 PROCEDURE — 84100 ASSAY OF PHOSPHORUS: CPT

## 2022-11-06 RX ORDER — MAGNESIUM SULFATE HEPTAHYDRATE 40 MG/ML
2 INJECTION, SOLUTION INTRAVENOUS ONCE
Status: COMPLETED | OUTPATIENT
Start: 2022-11-06 | End: 2022-11-06

## 2022-11-06 RX ORDER — CALCIUM GLUCONATE 20 MG/ML
1 INJECTION, SOLUTION INTRAVENOUS ONCE
Status: COMPLETED | OUTPATIENT
Start: 2022-11-06 | End: 2022-11-06

## 2022-11-06 RX ADMIN — NIFEDIPINE 60 MG: 60 TABLET, EXTENDED RELEASE ORAL at 08:28

## 2022-11-06 RX ADMIN — Medication 2 UNITS: at 08:32

## 2022-11-06 RX ADMIN — HYDRALAZINE HYDROCHLORIDE 10 MG: 20 INJECTION INTRAMUSCULAR; INTRAVENOUS at 23:21

## 2022-11-06 RX ADMIN — HEPARIN SODIUM 5000 UNITS: 5000 INJECTION INTRAVENOUS; SUBCUTANEOUS at 08:25

## 2022-11-06 RX ADMIN — SODIUM BICARBONATE: 84 INJECTION, SOLUTION INTRAVENOUS at 23:14

## 2022-11-06 RX ADMIN — CARVEDILOL 25 MG: 12.5 TABLET, FILM COATED ORAL at 17:52

## 2022-11-06 RX ADMIN — MAGNESIUM SULFATE HEPTAHYDRATE 2 G: 40 INJECTION, SOLUTION INTRAVENOUS at 08:29

## 2022-11-06 RX ADMIN — GUAIFENESIN 100 MG: 200 SOLUTION ORAL at 13:00

## 2022-11-06 RX ADMIN — INSULIN GLARGINE 18 UNITS: 100 INJECTION, SOLUTION SUBCUTANEOUS at 23:10

## 2022-11-06 RX ADMIN — Medication 4 UNITS: at 17:52

## 2022-11-06 RX ADMIN — SODIUM BICARBONATE: 84 INJECTION, SOLUTION INTRAVENOUS at 01:43

## 2022-11-06 RX ADMIN — CALCIUM GLUCONATE 1000 MG: 20 INJECTION, SOLUTION INTRAVENOUS at 08:17

## 2022-11-06 RX ADMIN — DEXAMETHASONE 6 MG: 4 TABLET ORAL at 08:26

## 2022-11-06 RX ADMIN — Medication 3 UNITS: at 17:51

## 2022-11-06 RX ADMIN — Medication 3 UNITS: at 23:10

## 2022-11-06 RX ADMIN — HEPARIN SODIUM 5000 UNITS: 5000 INJECTION INTRAVENOUS; SUBCUTANEOUS at 23:10

## 2022-11-06 RX ADMIN — HYDRALAZINE HYDROCHLORIDE 10 MG: 20 INJECTION INTRAMUSCULAR; INTRAVENOUS at 06:50

## 2022-11-06 RX ADMIN — HEPARIN SODIUM 5000 UNITS: 5000 INJECTION INTRAVENOUS; SUBCUTANEOUS at 17:51

## 2022-11-06 RX ADMIN — ACETAMINOPHEN 1000 MG: 500 TABLET ORAL at 17:52

## 2022-11-06 RX ADMIN — CARVEDILOL 25 MG: 12.5 TABLET, FILM COATED ORAL at 08:27

## 2022-11-06 RX ADMIN — ACETAMINOPHEN 1000 MG: 500 TABLET ORAL at 08:29

## 2022-11-06 RX ADMIN — LORAZEPAM 0.5 MG: 0.5 TABLET ORAL at 13:02

## 2022-11-06 RX ADMIN — NIFEDIPINE 60 MG: 60 TABLET, EXTENDED RELEASE ORAL at 17:52

## 2022-11-06 RX ADMIN — GUAIFENESIN 100 MG: 200 SOLUTION ORAL at 08:31

## 2022-11-06 RX ADMIN — CEFEPIME 1 G: 1 INJECTION, POWDER, FOR SOLUTION INTRAMUSCULAR; INTRAVENOUS at 13:01

## 2022-11-06 RX ADMIN — BUMETANIDE 2 MG: 1 TABLET ORAL at 08:31

## 2022-11-06 RX ADMIN — ZOLPIDEM TARTRATE 5 MG: 5 TABLET ORAL at 23:11

## 2022-11-06 RX ADMIN — PANTOPRAZOLE SODIUM 40 MG: 40 TABLET, DELAYED RELEASE ORAL at 06:49

## 2022-11-06 RX ADMIN — ATORVASTATIN CALCIUM 40 MG: 40 TABLET, FILM COATED ORAL at 08:27

## 2022-11-06 NOTE — PROGRESS NOTES
[address] [facility] Adult  Hospitalist Group                                                                                          Hospitalist Progress Note  [address] [facility],   Answering service: [phone] from in house phone        Date of Service:  2022  NAME:  Amirah Moffett  :  1960  MRN:  [id number]      Admission Summary:   Amirah Moffett is a 65 y.o. female with Stage IV/V CKD, T2DM (on insulin), GERD, HTN (uncontrolled), and HLD who presented to the ED complaining of severe diarrhea with work-up revealing RAYMOND on CKD, metabolic acidosis, and C Diff + (now on PO Vanc x 10 days). Admission c/b recurrent episodes of morning hypoglycemia and now respiratory distress with rising O2 requirement with CT Scan revealing multifocal PNA and diagnosed COVID-19+ (On Dexamethasone, Pulmonology consulted, broadened to Cefepime+Vancomycin)     Interval history / Subjective: Follow up respiratory failure, renal failure. Patient seen and examined this morning. Her daughter is on facetime during encounter. Patient states that she had a bad night due to anxiety. She is struggling with isolation. She feels her breathing is improved. Cr 6.9. She is on room air during encounter.       Assessment & Plan:     Acute hypoxic respiratory failure- resolved to room air   Multifocal PNA, + COVID-19 on 22, viral with possible superimposed bacterial Pseudomonas Pneumonia  Continuing cefepime x 7 days, end   Continue dexamethasone x 10 days  s/p tocilizumab  Ambulate as able, incentive spirometer    RAYMOND superimposed on CKD stage IV/V, worsening  Metabolic Acidosis  Presented with creatinine 5.33 and BUN 42  Creatinine upward trend, 6.9  Nephrology following, appreciate recommendations  Continuing sodium bicarbonate infusion    Hypocalcemia:   Hypomagnesemia:   -replete    Anxiety  Lorazepam as needed BID    Acute diarrhea secondary to C Difficile Infection, POA, resolved  Diarrhea has resolved  Continuing oral vancomycin- final dose 11/5/2022    Hypertensive emergency, POA, improving   Systolic blood pressure greater than 180 at presentation, was previously on Cardene infusion, since weaned off  Continuing carvedilol  Continuing nifedipine  She also has as needed hydralazine IV with parameters    Asymptomatic bacteriuria, POA  Urinalysis with bacteria however otherwise unremarkable  Regardless, she is already on antibiotics as above    Chronic normocytic anemia  Hemoglobin stable at baseline    Diabetes mellitus type 2, uncontrolled  Sugars better controlled  Hyperglycemia likely related to steroids  Increase long acting to 18 units qhs  Add meal time, continue SSI     History of CVA  no residuals  Continuing statin    Hyperlipidemia  Stable  Continuing statin     Obesity  BMI of 31  Counseled on healthy dietary choices     Code status:DNR    Prophylaxis: UFH  Care Plan discussed with: Pt, RN  Anticipated Disposition: >48 hours pending renal function      Hospital Problems  Date Reviewed: 8/23/2022            Codes Class Noted POA    Diarrhea ICD-10-CM: R19.7  ICD-9-CM: 787.91  10/25/2022 Unknown        RAYMOND (acute kidney injury) [facility]) ICD-10-CM: N17.9  ICD-9-CM: 584.9  10/25/2022 Unknown       Review of Systems:   A comprehensive review of systems was negative except for that written in the HPI. Vital Signs:    Last 24hrs VS reviewed since prior progress note.  Most recent are:  Visit Vitals  BP (!) 139/52 (BP 1 Location: Right upper arm, BP Patient Position: Sitting)   Pulse 67   Temp 98.4 °F (36.9 °C)   Resp 20   Ht 5' 4\" (1.626 m)   Wt 85 kg (187 lb 6.3 oz)   SpO2 100%   BMI 32.17 kg/m²         Intake/Output Summary (Last 24 hours) at 11/6/2022 1243  Last data filed at 11/6/2022 6698  Gross per 24 hour   Intake 1028.75 ml   Output --   Net 1028.75 ml          Physical Examination:     I had a face to face encounter with this patient and independently examined them on 11/6/2022 as outlined below:    Constitutional:  comfortable   ENT:  Oral mucosa moist.    Resp:  Diminished bilaterally with faint crackles, appears unable to take deep breath, No pursed lip breathing. CV:  Regular rhythm, normal rate    GI:  Soft, non distended, non tender. Musculoskeletal:  No edema, warm. Trace edema bilaterally     Neurologic:  Moves all extremities spontaneously and ambulating independently. AAOx3                                  Data Review:    Review and/or order of clinical lab test  Review and/or order of tests in the radiology section of CPT  Review and/or order of tests in the medicine section of CPT      Labs:     Recent Labs     11/06/22 0055 11/05/22  0507   WBC 7.8 8.2   HGB 7.6* 7.9*   HCT 23.8* 25.1*    225       Recent Labs     11/06/22 0055 11/05/22 1056 11/04/22  0625    138 137   K 3.8 3.7 4.0    104 105   CO2 24 24 22   BUN 76* 73* 65*   CREA 6.93* 7.20* 7.27*   * 144* 175*   CA 6.4* 6.7* 7.4*   MG 1.4*  --  1.8   PHOS 3.8  --  4.5       Recent Labs     11/06/22 0055 11/05/22 1056 11/04/22  0625   ALT 14 12 14   AP 75 71 75   TBILI 0.4 0.5 0.5   TP 5.9* 6.3* 6.4   ALB 2.5* 2.5* 2.4*   GLOB 3.4 3.8 4.0       No results for input(s): INR, PTP, APTT, INREXT, INREXT in the last 72 hours. No results for input(s): FE, TIBC, PSAT, FERR in the last 72 hours. No results found for: FOL, RBCF   No results for input(s): PH, PCO2, PO2 in the last 72 hours. No results for input(s): CPK, CKNDX, TROIQ in the last 72 hours.     No lab exists for component: CPKMB  No results found for: CHOL, CHOLX, CHLST, CHOLV, HDL, HDLP, LDL, LDLC, DLDLP, TGLX, TRIGL, TRIGP, CHHD, CHHDX  Lab Results   Component Value Date/Time    Glucose (POC) 144 (H) 11/06/2022 11:53 AM    Glucose (POC) 173 (H) 11/06/2022 06:56 AM    Glucose (POC) 210 (H) 11/05/2022 09:54 PM    Glucose (POC) 203 (H) 11/05/2022 05:05 PM    Glucose (POC) 183 (H) 11/05/2022 11:58 AM     Lab Results   Component Value Date/Time    Color YELLOW/STRAW 10/26/2022 05:30 AM    Appearance CLEAR 10/26/2022 05:30 AM    Specific gravity 1.011 10/26/2022 05:30 AM    Specific gravity 1.025 04/19/2013 11:30 AM    pH (UA) 6.0 10/26/2022 05:30 AM    Protein 300 (A) 10/26/2022 05:30 AM    Glucose Negative 10/26/2022 05:30 AM    Ketone Negative 10/26/2022 05:30 AM    Bilirubin Negative 10/26/2022 05:30 AM    Urobilinogen 0.2 10/26/2022 05:30 AM    Nitrites Negative 10/26/2022 05:30 AM    Leukocyte Esterase Negative 10/26/2022 05:30 AM    Epithelial cells FEW 10/26/2022 05:30 AM    Bacteria 1+ (A) 10/26/2022 05:30 AM    WBC 0-4 10/26/2022 05:30 AM    RBC 0-5 10/26/2022 05:30 AM     Imaging:   -- Radiology: CT C/A/P on 11/1:  IMPRESSION     1. Bilateral, diffuse airspace infiltrates consistent with multifocal pneumonia. There are small bilateral pleural effusions. 2. No acute findings in the abdomen or pelvis. 3. Gallstones.     Medications Reviewed:     Current Facility-Administered Medications   Medication Dose Route Frequency    insulin glargine (LANTUS) injection 18 Units  18 Units SubCUTAneous QHS    insulin lispro (HUMALOG) injection 4 Units  4 Units SubCUTAneous TIDAC    insulin lispro (HUMALOG) injection   SubCUTAneous AC&HS    sodium bicarbonate (8.4%) 150 mEq in dextrose 5% 1,000 mL infusion   IntraVENous CONTINUOUS    cefepime (MAXIPIME) 1 g in 0.9% sodium chloride (MBP/ADV) 50 mL MBP  1 g IntraVENous Q24H    pantoprazole (PROTONIX) tablet 40 mg  40 mg Oral ACB    dexAMETHasone (DECADRON) tablet 6 mg  6 mg Oral DAILY    benzonatate (TESSALON) capsule 100 mg  100 mg Oral TID PRN    guaiFENesin (ROBITUSSIN) 100 mg/5 mL oral liquid 100 mg  100 mg Oral Q4H PRN    pseudoephedrine (SUDAFED) tablet 30 mg  30 mg Oral Q6H PRN    LORazepam (ATIVAN) tablet 0.5 mg  0.5 mg Oral BID PRN    albuterol-ipratropium (DUO-NEB) 2.5 MG-0.5 MG/3 ML  3 mL Nebulization Q6H PRN    acetaminophen (TYLENOL) tablet 1,000 mg  1,000 mg Oral Q6H PRN bumetanide (BUMEX) tablet 2 mg  2 mg Oral DAILY    heparin (porcine) injection 5,000 Units  5,000 Units SubCUTAneous Q8H    prochlorperazine (COMPAZINE) tablet 5 mg  5 mg Oral Q6H PRN    Or    prochlorperazine (COMPAZINE) injection 5 mg  5 mg IntraVENous Q6H PRN    NIFEdipine ER (PROCARDIA XL) tablet 60 mg  60 mg Oral BID    melatonin tablet 3 mg  3 mg Oral QHS PRN    zolpidem (AMBIEN) tablet 5 mg  5 mg Oral QHS PRN    ondansetron (ZOFRAN) injection 4 mg  4 mg IntraVENous Q6H PRN    hydrALAZINE (APRESOLINE) 20 mg/mL injection 10 mg  10 mg IntraVENous Q6H PRN    glucose chewable tablet 16 g  4 Tablet Oral PRN    glucagon (GLUCAGEN) injection 1 mg  1 mg IntraMUSCular PRN    dextrose 10 % infusion 0-250 mL  0-250 mL IntraVENous PRN    atorvastatin (LIPITOR) tablet 40 mg  40 mg Oral DAILY    carvediloL (COREG) tablet 25 mg  25 mg Oral BID WITH MEALS     ______________________________________________________________________  EXPECTED LENGTH OF STAY: 4d 7h  ACTUAL LENGTH OF STAY:          [address] [facility],

## 2022-11-06 NOTE — PROGRESS NOTES
Renal Progress Note    NAME:  Johanna Andino   :   1960   MRN:   [id number]     Date/Time:  2022 10:23 AM      Assessment:     Acute Kidney Injury --> sec to vol depletion with GI losses - Scr remains above her baseline  CKD (Stage 4/Stage 5) . Patient is aware of future need for RRT. She was already referred to [facility] educational seminar. GFR is declining due to Covid. Scr peaked and is coming down now. C Diff diarrhea, no more diarrhea  Met Acidosis - sec to CKD and partly diarrhea-improved  Hypomagnesemia  HTN-improved  DM2  Acute hypoxic resp failure, due to Covid pneumonia, the O2 requirements are down   Acute Covid 19  10. Severe anxiety. 11. Hypocalcemia from RAYMOND  Hypomagnesemia - poor oral intake       Plan:     No immediate need for RRT, but patient's renal function may worsen further as it is the case with acute Covid. Replace Ca and Mg- done by primary team  Change Bumex to oral  Antibiotics per primary team  Adjusted Na Bicarb to 650 MG TID  Replete Mg prn  Encourage oral intake. Avoid NSAIDs + IV contrast.  Dose meds for GFR. BP control  Renal panel in AM         Subjective:     10/28 feeling ok, still has diarrhea, C diff+ on po vanco, cr at baseline, Na higher-hypoglycemic this am  10/31 was hypoxic yesterday and SOB, CXR showed pulm edema vs PNA. Has productive cough, febrile. Diarrhea has stopped. Given one dose bumex and started doxy. She feels better today, requires 2 lit O2  22 Patient c/o SOB, cough, sputum production and abdominal pain. 22 patient wants to go home. She has anxiety flare up. Her SOB is improved.     Review of Systems:  Y  N       Y  N  []         []          Fever/chills                                               []         []          Chest Pain  [x]         []          Cough                                                       []         []          Diarrhea   [x]         []          Sputum []         []          Constipation  [x]         []          SOB/WALLACE                                                []         []          Nausea/Vomit  [x]         []          Abd Pain                                                    []         []          Tolerating PT  []         []          Dysuria                                                      []         []          Tolerating Diet     []        Unable to obtain  ROS due to  []        mental status change  []        sedated   []        intubated    Medications reviewed:  Current Facility-Administered Medications   Medication Dose Route Frequency    magnesium sulfate 2 g/50 ml IVPB (premix or compounded)  2 g IntraVENous ONCE    insulin glargine (LANTUS) injection 18 Units  18 Units SubCUTAneous QHS    insulin lispro (HUMALOG) injection 4 Units  4 Units SubCUTAneous TIDAC    insulin lispro (HUMALOG) injection   SubCUTAneous AC&HS    sodium bicarbonate (8.4%) 150 mEq in dextrose 5% 1,000 mL infusion   IntraVENous CONTINUOUS    cefepime (MAXIPIME) 1 g in 0.9% sodium chloride (MBP/ADV) 50 mL MBP  1 g IntraVENous Q24H    pantoprazole (PROTONIX) tablet 40 mg  40 mg Oral ACB    dexAMETHasone (DECADRON) tablet 6 mg  6 mg Oral DAILY    benzonatate (TESSALON) capsule 100 mg  100 mg Oral TID PRN    guaiFENesin (ROBITUSSIN) 100 mg/5 mL oral liquid 100 mg  100 mg Oral Q4H PRN    pseudoephedrine (SUDAFED) tablet 30 mg  30 mg Oral Q6H PRN    LORazepam (ATIVAN) tablet 0.5 mg  0.5 mg Oral BID PRN    albuterol-ipratropium (DUO-NEB) 2.5 MG-0.5 MG/3 ML  3 mL Nebulization Q6H PRN    acetaminophen (TYLENOL) tablet 1,000 mg  1,000 mg Oral Q6H PRN    bumetanide (BUMEX) tablet 2 mg  2 mg Oral DAILY    heparin (porcine) injection 5,000 Units  5,000 Units SubCUTAneous Q8H    prochlorperazine (COMPAZINE) tablet 5 mg  5 mg Oral Q6H PRN    Or    prochlorperazine (COMPAZINE) injection 5 mg  5 mg IntraVENous Q6H PRN    NIFEdipine ER (PROCARDIA XL) tablet 60 mg  60 mg Oral BID    melatonin tablet 3 mg  3 mg Oral QHS PRN    zolpidem (AMBIEN) tablet 5 mg  5 mg Oral QHS PRN    ondansetron (ZOFRAN) injection 4 mg  4 mg IntraVENous Q6H PRN    hydrALAZINE (APRESOLINE) 20 mg/mL injection 10 mg  10 mg IntraVENous Q6H PRN    glucose chewable tablet 16 g  4 Tablet Oral PRN    glucagon (GLUCAGEN) injection 1 mg  1 mg IntraMUSCular PRN    dextrose 10 % infusion 0-250 mL  0-250 mL IntraVENous PRN    atorvastatin (LIPITOR) tablet 40 mg  40 mg Oral DAILY    carvediloL (COREG) tablet 25 mg  25 mg Oral BID WITH MEALS        Objective:   Vitals:  Visit Vitals  BP (!) 165/67   Pulse 73   Temp 98 °F (36.7 °C)   Resp 22   Ht 5' 4\" (1.626 m)   Wt 85 kg (187 lb 6.3 oz)   LMP 10/07/2012   SpO2 100%   BMI 32.17 kg/m²     Temp (24hrs), Av.1 °F (36.7 °C), Min:97.9 °F (36.6 °C), Max:98.2 °F (36.8 °C)    O2 Flow Rate (L/min): 1 l/min O2 Device: Nasal cannula    Last 24hr Input/Output:    Intake/Output Summary (Last 24 hours) at 2022 0958  Last data filed at 2022 0400  Gross per 24 hour   Intake 550 ml   Output --   Net 550 ml          PHYSICAL EXAM:    Seen in CCU 24    General:    Awake and alert, uncomfortable. Eyes:   No icterus    Nose               O2 via NC    Lungs:   BL crackles    Heart:   RRR, No S 3 gallop, no pericardial rub  . Abdomen:   Not distended. Tender    Extremities: 1+ edema    Psych:  Calm    Neurologic: Responding appropriately.         []        Telemetry Reviewed     []        NSR []        PAC/PVCs   []        Afib  []        Paced   []        NSVT   []        Hough []        NGT  []        Intubated on vent    Lab Data Reviewed:    Recent Results (from the past 24 hour(s))   GLUCOSE, POC    Collection Time: 22 11:58 AM   Result Value Ref Range    Glucose (POC) 183 (H) 65 - 117 mg/dL    Performed by Rodolfo Mak    GLUCOSE, POC    Collection Time: 22  5:05 PM   Result Value Ref Range    Glucose (POC) 203 (H) 65 - 117 mg/dL    Performed by Norma Anand    GLUCOSE, POC    Collection Time: 11/05/22  9:54 PM   Result Value Ref Range    Glucose (POC) 210 (H) 65 - 117 mg/dL    Performed by Paradise James    CBC WITH AUTOMATED DIFF    Collection Time: 11/06/22 12:55 AM   Result Value Ref Range    WBC 7.8 3.6 - 11.0 K/uL    RBC 2.90 (L) 3.80 - 5.20 M/uL    HGB 7.6 (L) 11.5 - 16.0 g/dL    HCT 23.8 (L) 35.0 - 47.0 %    MCV 82.1 80.0 - 99.0 FL    MCH 26.2 26.0 - 34.0 PG    MCHC 31.9 30.0 - 36.5 g/dL    RDW 14.9 (H) 11.5 - 14.5 %    PLATELET 414 177 - 982 K/uL    MPV 12.6 8.9 - 12.9 FL    NRBC 0.0 0  WBC    ABSOLUTE NRBC 0.00 0.00 - 0.01 K/uL    NEUTROPHILS 84 (H) 32 - 75 %    LYMPHOCYTES 8 (L) 12 - 49 %    MONOCYTES 6 5 - 13 %    EOSINOPHILS 0 0 - 7 %    BASOPHILS 0 0 - 1 %    IMMATURE GRANULOCYTES 2 (H) 0.0 - 0.5 %    ABS. NEUTROPHILS 6.5 1.8 - 8.0 K/UL    ABS. LYMPHOCYTES 0.6 (L) 0.8 - 3.5 K/UL    ABS. MONOCYTES 0.5 0.0 - 1.0 K/UL    ABS. EOSINOPHILS 0.0 0.0 - 0.4 K/UL    ABS. BASOPHILS 0.0 0.0 - 0.1 K/UL    ABS. IMM. GRANS. 0.2 (H) 0.00 - 0.04 K/UL    DF SMEAR SCANNED      RBC COMMENTS OVALOCYTES  PRESENT        RBC COMMENTS GHISLAINE CELLS  PRESENT       MAGNESIUM    Collection Time: 11/06/22 12:55 AM   Result Value Ref Range    Magnesium 1.4 (L) 1.6 - 2.4 mg/dL   PHOSPHORUS    Collection Time: 11/06/22 12:55 AM   Result Value Ref Range    Phosphorus 3.8 2.6 - 4.7 MG/DL   METABOLIC PANEL, COMPREHENSIVE    Collection Time: 11/06/22 12:55 AM   Result Value Ref Range    Sodium 138 136 - 145 mmol/L    Potassium 3.8 3.5 - 5.1 mmol/L    Chloride 106 97 - 108 mmol/L    CO2 24 21 - 32 mmol/L    Anion gap 8 5 - 15 mmol/L    Glucose 124 (H) 65 - 100 mg/dL    BUN 76 (H) 6 - 20 MG/DL    Creatinine 6.93 (H) 0.55 - 1.02 MG/DL    BUN/Creatinine ratio 11 (L) 12 - 20      eGFR 6 (L) >60 ml/min/1.73m2    Calcium 6.4 (LL) 8.5 - 10.1 MG/DL    Bilirubin, total 0.4 0.2 - 1.0 MG/DL    ALT (SGPT) 14 12 - 78 U/L    AST (SGOT) 13 (L) 15 - 37 U/L    Alk.  phosphatase 75 45 - 117 U/L    Protein, total 5.9 (L) 6.4 - 8.2 g/dL    Albumin 2.5 (L) 3.5 - 5.0 g/dL    Globulin 3.4 2.0 - 4.0 g/dL    A-G Ratio 0.7 (L) 1.1 - 2.2     GLUCOSE, POC    Collection Time: 11/06/22  6:56 AM   Result Value Ref Range    Glucose (POC) 173 (H) 65 - 117 mg/dL    Performed by Selina Walsh        Total time spent with patient:  []        15   []        25   []        35   []         __ minutes    []        Critical Care Provided    Care Plan discussed with:    [x]        Patient   []        Family    []        Care Manager   []        Consultant/Specialist :      []          >50% of visit spent in counseling and coordination of care   (Discussed []        CODE status,  []        Care Plan, []        D/C Planning)    ___________________________________________________    Attending Physician: Di Ruggiero MD

## 2022-11-06 NOTE — ROUTINE PROCESS
1945: Verbal shift change report given to Lexy Acuna RN (oncoming nurse) by Letty Sheth RN (offgoing nurse). Report included the following information SBAR, ED Summary, Intake/Output, MAR, Recent Results, Cardiac Rhythm NSR, and Quality Measures. The beginning of Daylight Saving Time occurred at 0200 hrs. Documentation of patient care and medications administered is done with respect to the time change. 3745: Patient stated she's feeling lightheaded and dizzy. Has been feeling like this for the past 1 hour. Denies anxiety. RR 29 and /69, otherwise VSS. On 1L NC. Appears anxious. Provided cold orange juice and emotional support. After drinking OJ patient says her dizziness and lightheadedness has resolved. 4645: /65 on recheck. Gave PRN 10 mg hydralazine IV.     0656:  after 2 cups of OJ.     0730: Dr. Garcia Has aware of Ca 6.4. Said she will review the chart and place orders. 0740: Verbal shift change report given to Neeraj Kirby RN (oncoming nurse) by Lexy Acuna RN (offgoing nurse). Report included the following information SBAR, ED Summary, Intake/Output, MAR, Recent Results, Cardiac Rhythm NSR, and Quality Measures.

## 2022-11-06 NOTE — PROGRESS NOTES
Bedside shift change report given to Juanita Easton RN (oncoming nurse) by Jaspreet Sharif RN (offgoing nurse). Report included the following information SBAR, Kardex, ED Summary, Intake/Output, MAR, Accordion, Recent Results, and Cardiac Rhythm NSR .

## 2022-11-07 LAB
ALBUMIN SERPL-MCNC: 2.6 G/DL (ref 3.5–5)
ANION GAP SERPL CALC-SCNC: 10 MMOL/L (ref 5–15)
BACTERIA SPEC CULT: NORMAL
BASOPHILS # BLD: 0 K/UL (ref 0–0.1)
BASOPHILS NFR BLD: 0 % (ref 0–1)
BUN SERPL-MCNC: 70 MG/DL (ref 6–20)
BUN/CREAT SERPL: 10 (ref 12–20)
CALCIUM SERPL-MCNC: 6.7 MG/DL (ref 8.5–10.1)
CHLORIDE SERPL-SCNC: 102 MMOL/L (ref 97–108)
CO2 SERPL-SCNC: 28 MMOL/L (ref 21–32)
CREAT SERPL-MCNC: 6.72 MG/DL (ref 0.55–1.02)
DIFFERENTIAL METHOD BLD: ABNORMAL
EOSINOPHIL # BLD: 0 K/UL (ref 0–0.4)
EOSINOPHIL NFR BLD: 0 % (ref 0–7)
ERYTHROCYTE [DISTWIDTH] IN BLOOD BY AUTOMATED COUNT: 14.8 % (ref 11.5–14.5)
GLUCOSE BLD STRIP.AUTO-MCNC: 113 MG/DL (ref 65–117)
GLUCOSE BLD STRIP.AUTO-MCNC: 149 MG/DL (ref 65–117)
GLUCOSE BLD STRIP.AUTO-MCNC: 151 MG/DL (ref 65–117)
GLUCOSE BLD STRIP.AUTO-MCNC: 171 MG/DL (ref 65–117)
GLUCOSE SERPL-MCNC: 131 MG/DL (ref 65–100)
HCT VFR BLD AUTO: 25.5 % (ref 35–47)
HGB BLD-MCNC: 8 G/DL (ref 11.5–16)
IMM GRANULOCYTES # BLD AUTO: 0.2 K/UL (ref 0–0.04)
IMM GRANULOCYTES NFR BLD AUTO: 2 % (ref 0–0.5)
LYMPHOCYTES # BLD: 1 K/UL (ref 0.8–3.5)
LYMPHOCYTES NFR BLD: 10 % (ref 12–49)
MAGNESIUM SERPL-MCNC: 2 MG/DL (ref 1.6–2.4)
MCH RBC QN AUTO: 26.6 PG (ref 26–34)
MCHC RBC AUTO-ENTMCNC: 31.4 G/DL (ref 30–36.5)
MCV RBC AUTO: 84.7 FL (ref 80–99)
MONOCYTES # BLD: 0.5 K/UL (ref 0–1)
MONOCYTES NFR BLD: 6 % (ref 5–13)
NEUTS SEG # BLD: 7.8 K/UL (ref 1.8–8)
NEUTS SEG NFR BLD: 82 % (ref 32–75)
NRBC # BLD: 0 K/UL (ref 0–0.01)
NRBC BLD-RTO: 0 PER 100 WBC
PHOSPHATE SERPL-MCNC: 3.6 MG/DL (ref 2.6–4.7)
PLATELET # BLD AUTO: 268 K/UL (ref 150–400)
PMV BLD AUTO: 11.8 FL (ref 8.9–12.9)
POTASSIUM SERPL-SCNC: 3.4 MMOL/L (ref 3.5–5.1)
RBC # BLD AUTO: 3.01 M/UL (ref 3.8–5.2)
SERVICE CMNT-IMP: ABNORMAL
SERVICE CMNT-IMP: NORMAL
SERVICE CMNT-IMP: NORMAL
SODIUM SERPL-SCNC: 140 MMOL/L (ref 136–145)
WBC # BLD AUTO: 9.5 K/UL (ref 3.6–11)

## 2022-11-07 PROCEDURE — 74011636637 HC RX REV CODE- 636/637: Performed by: INTERNAL MEDICINE

## 2022-11-07 PROCEDURE — 94760 N-INVAS EAR/PLS OXIMETRY 1: CPT

## 2022-11-07 PROCEDURE — 65660000001 HC RM ICU INTERMED STEPDOWN

## 2022-11-07 PROCEDURE — 74011250636 HC RX REV CODE- 250/636: Performed by: INTERNAL MEDICINE

## 2022-11-07 PROCEDURE — 74011250637 HC RX REV CODE- 250/637: Performed by: HOSPITALIST

## 2022-11-07 PROCEDURE — 74011250637 HC RX REV CODE- 250/637: Performed by: INTERNAL MEDICINE

## 2022-11-07 PROCEDURE — 85025 COMPLETE CBC W/AUTO DIFF WBC: CPT

## 2022-11-07 PROCEDURE — 74011250637 HC RX REV CODE- 250/637: Performed by: PHYSICIAN ASSISTANT

## 2022-11-07 PROCEDURE — 80069 RENAL FUNCTION PANEL: CPT

## 2022-11-07 PROCEDURE — 74011250637 HC RX REV CODE- 250/637: Performed by: FAMILY MEDICINE

## 2022-11-07 PROCEDURE — 74011000258 HC RX REV CODE- 258: Performed by: INTERNAL MEDICINE

## 2022-11-07 PROCEDURE — 83735 ASSAY OF MAGNESIUM: CPT

## 2022-11-07 PROCEDURE — 74011250637 HC RX REV CODE- 250/637: Performed by: STUDENT IN AN ORGANIZED HEALTH CARE EDUCATION/TRAINING PROGRAM

## 2022-11-07 PROCEDURE — 82962 GLUCOSE BLOOD TEST: CPT

## 2022-11-07 PROCEDURE — 36415 COLL VENOUS BLD VENIPUNCTURE: CPT

## 2022-11-07 PROCEDURE — 74011250636 HC RX REV CODE- 250/636: Performed by: PHYSICIAN ASSISTANT

## 2022-11-07 RX ORDER — SODIUM CHLORIDE 9 MG/ML
50 INJECTION, SOLUTION INTRAVENOUS CONTINUOUS
Status: DISCONTINUED | OUTPATIENT
Start: 2022-11-07 | End: 2022-11-08

## 2022-11-07 RX ORDER — CALCIUM CARBONATE 200(500)MG
200 TABLET,CHEWABLE ORAL
Status: COMPLETED | OUTPATIENT
Start: 2022-11-07 | End: 2022-11-09

## 2022-11-07 RX ADMIN — Medication 4 UNITS: at 17:58

## 2022-11-07 RX ADMIN — NIFEDIPINE 60 MG: 60 TABLET, EXTENDED RELEASE ORAL at 10:00

## 2022-11-07 RX ADMIN — SODIUM CHLORIDE 50 ML/HR: 9 INJECTION, SOLUTION INTRAVENOUS at 12:54

## 2022-11-07 RX ADMIN — ATORVASTATIN CALCIUM 40 MG: 40 TABLET, FILM COATED ORAL at 10:00

## 2022-11-07 RX ADMIN — INSULIN GLARGINE 18 UNITS: 100 INJECTION, SOLUTION SUBCUTANEOUS at 22:25

## 2022-11-07 RX ADMIN — CARVEDILOL 25 MG: 12.5 TABLET, FILM COATED ORAL at 17:58

## 2022-11-07 RX ADMIN — CALCIUM CARBONATE (ANTACID) CHEW TAB 500 MG 200 MG: 500 CHEW TAB at 17:58

## 2022-11-07 RX ADMIN — HEPARIN SODIUM 5000 UNITS: 5000 INJECTION INTRAVENOUS; SUBCUTANEOUS at 10:01

## 2022-11-07 RX ADMIN — PANTOPRAZOLE SODIUM 40 MG: 40 TABLET, DELAYED RELEASE ORAL at 07:29

## 2022-11-07 RX ADMIN — BENZONATATE 100 MG: 100 CAPSULE ORAL at 09:58

## 2022-11-07 RX ADMIN — BUMETANIDE 2 MG: 1 TABLET ORAL at 09:57

## 2022-11-07 RX ADMIN — DEXAMETHASONE 6 MG: 4 TABLET ORAL at 09:58

## 2022-11-07 RX ADMIN — CEFEPIME 1 G: 1 INJECTION, POWDER, FOR SOLUTION INTRAMUSCULAR; INTRAVENOUS at 12:54

## 2022-11-07 RX ADMIN — CARVEDILOL 25 MG: 12.5 TABLET, FILM COATED ORAL at 10:00

## 2022-11-07 RX ADMIN — HYDRALAZINE HYDROCHLORIDE 10 MG: 20 INJECTION INTRAMUSCULAR; INTRAVENOUS at 21:30

## 2022-11-07 RX ADMIN — ACETAMINOPHEN 1000 MG: 500 TABLET ORAL at 10:00

## 2022-11-07 RX ADMIN — Medication 4 UNITS: at 12:55

## 2022-11-07 RX ADMIN — HEPARIN SODIUM 5000 UNITS: 5000 INJECTION INTRAVENOUS; SUBCUTANEOUS at 17:59

## 2022-11-07 RX ADMIN — NIFEDIPINE 60 MG: 60 TABLET, EXTENDED RELEASE ORAL at 17:58

## 2022-11-07 NOTE — PROGRESS NOTES
Bedside shift change report given to [address] (oncoming nurse) by Francheska Escoto (offgoing nurse). Report included the following information SBAR, Kardex, Intake/Output, MAR, and Cardiac Rhythm NSR .

## 2022-11-07 NOTE — PROGRESS NOTES
Patient provided self care. Patient expressing anxiety and frustration with staff for \"being left alone for hours\". Nurse provided comfort to patient through therapeutic touch and empathy for frustrations.  Patient mood improved when assisted with self care

## 2022-11-07 NOTE — PROGRESS NOTES
[address] [facility] Adult  Hospitalist Group                                                                                          Hospitalist Progress Note  Barbara Wong MD  Answering service: [phone] from in house phone        Date of Service:  2022  NAME:  Ashwin Lindquist  :  1960  MRN:  [id number]      Admission Summary:     Ashwin Lindquist is a 65 y.o. female with Stage IV/V CKD, T2DM (on insulin), GERD, HTN (uncontrolled), and HLD who presented to the ED complaining of severe diarrhea with work-up revealing RAYMOND on CKD, metabolic acidosis, and C Diff + (now on PO Vanc x 10 days). Admission c/b recurrent episodes of morning hypoglycemia and now respiratory distress with rising O2 requirement with CT Scan revealing multifocal PNA and diagnosed COVID-19+ (On Dexamethasone, Pulmonology consulted, broadened to Cefepime+Vancomycin)     Interval history / Subjective:     She said she has frequent diarrhea, no abdominal pain, chest pain or shortness.  She said she feels anxious but she doesn't want to see a psychiatrist      Assessment & Plan:     Acute hypoxic respiratory failure- resolved to room air   Multifocal PNA, + COVID-19 on 22, viral with possible superimposed bacterial Pseudomonas Pneumonia  Cefepime x 7 days, end   Continue dexamethasone x 10 days, last dose 11/10  s/p tocilizumab  Ambulate as able, incentive spirometer  SpO2 100% on RA    RAYMOND superimposed on CKD stage IV/V, worsening  Metabolic Acidosis  Presented with creatinine 5.33 and BUN 42  Creatinine upward trend, 6.72  Avoid nephrotoxin   Continuing sodium bicarbonate infusion  Monitor renal function   Nephrology on board    Hypocalcemia  -replace with calcium   -repeat ca level in am    Hypomagnesemia:   -replete    Anxiety  Lorazepam as needed BID  Declined to see a psychiatrist     Acute diarrhea secondary to C Difficile Infection, POA, resolved  Diarrhea has resolved  Oral vancomycin- last dose 11/5/2022    Hypertensive emergency, POA, improving   previously on Cardene infusion, since weaned off  BP improving 147-168/50-78  Continuing carvedilol and nifedipine  She also has as needed hydralazine IV with parameters    Asymptomatic bacteriuria, POA  Urinalysis with bacteria however otherwise unremarkable  Regardless, she is already on antibiotics as above    Chronic normocytic anemia  Hemoglobin stable at baseline    T2DM uncontrolled  A1c 9.2  Sugars better controlled  Hyperglycemia likely related to steroids  Continue long acting to 18 units qhs, lispro 4 unitis, sliding scale and monitor finger stick glucose        Hx of CVA  no residuals  Continuing statin    Hyperlipidemia  Stable  Continuing statin     Obesity  BMI of 31  Counseled on healthy dietary choices     Code status:DNR    Prophylaxis: UFH  Care Plan discussed with: Pt, RN  Anticipated Disposition: >48 hours pending renal function      Hospital Problems  Date Reviewed: 8/23/2022            Codes Class Noted POA    Diarrhea ICD-10-CM: R19.7  ICD-9-CM: 787.91  10/25/2022 Unknown        RAYMOND (acute kidney injury) [facility]) ICD-10-CM: N17.9  ICD-9-CM: 584.9  10/25/2022 Unknown       Review of Systems:   A comprehensive review of systems was negative except for that written in the HPI. Vital Signs:    Last 24hrs VS reviewed since prior progress note.  Most recent are:  Visit Vitals  BP (!) 147/50 (BP 1 Location: Right upper arm, BP Patient Position: At rest;Reclining;Sitting)   Pulse 78   Temp 98.3 °F (36.8 °C)   Resp 20   Ht 5' 4\" (1.626 m)   Wt 85 kg (187 lb 6.3 oz)   SpO2 100%   BMI 32.17 kg/m²         Intake/Output Summary (Last 24 hours) at 11/7/2022 1416  Last data filed at 11/7/2022 0755  Gross per 24 hour   Intake 1257.23 ml   Output --   Net 1257.23 ml          Physical Examination:     I had a face to face encounter with this patient and independently examined them on 11/7/2022 as outlined below:    Constitutional: comfortable   ENT:  Oral mucosa moist.    Resp:  Diminished bilaterally with faint crackles, appears unable to take deep breath, No pursed lip breathing. CV:  Regular rhythm, normal rate    GI:  Soft, non distended, non tender. Musculoskeletal:  No edema, warm. Trace edema bilaterally     Neurologic:  Moves all extremities spontaneously and ambulating independently. AAOx3                                  Data Review:    Review and/or order of clinical lab test  Review and/or order of tests in the radiology section of CPT  Review and/or order of tests in the medicine section of CPT      Labs:     Recent Labs     11/07/22 0508 11/06/22 0055   WBC 9.5 7.8   HGB 8.0* 7.6*   HCT 25.5* 23.8*    221       Recent Labs     11/07/22 0508 11/06/22 0055 11/05/22  1056    138 138   K 3.4* 3.8 3.7    106 104   CO2 28 24 24   BUN 70* 76* 73*   CREA 6.72* 6.93* 7.20*   * 124* 144*   CA 6.7* 6.4* 6.7*   MG 2.0 1.4*  --    PHOS 3.6 3.8  --        Recent Labs     11/07/22 0508 11/06/22 0055 11/05/22  1056   ALT  --  14 12   AP  --  75 71   TBILI  --  0.4 0.5   TP  --  5.9* 6.3*   ALB 2.6* 2.5* 2.5*   GLOB  --  3.4 3.8       No results for input(s): INR, PTP, APTT, INREXT, INREXT in the last 72 hours. No results for input(s): FE, TIBC, PSAT, FERR in the last 72 hours. No results found for: FOL, RBCF   No results for input(s): PH, PCO2, PO2 in the last 72 hours. No results for input(s): CPK, CKNDX, TROIQ in the last 72 hours.     No lab exists for component: CPKMB  No results found for: CHOL, CHOLX, CHLST, CHOLV, HDL, HDLP, LDL, LDLC, DLDLP, TGLX, TRIGL, TRIGP, CHHD, CHHDX  Lab Results   Component Value Date/Time    Glucose (POC) 171 (H) 11/07/2022 12:37 PM    Glucose (POC) 113 11/07/2022 07:31 AM    Glucose (POC) 250 (H) 11/06/2022 09:13 PM    Glucose (POC) 247 (H) 11/06/2022 03:43 PM    Glucose (POC) 144 (H) 11/06/2022 11:53 AM     Lab Results   Component Value Date/Time    Color YELLOW/STRAW 10/26/2022 05:30 AM    Appearance CLEAR 10/26/2022 05:30 AM    Specific gravity 1.011 10/26/2022 05:30 AM    Specific gravity 1.025 04/19/2013 11:30 AM    pH (UA) 6.0 10/26/2022 05:30 AM    Protein 300 (A) 10/26/2022 05:30 AM    Glucose Negative 10/26/2022 05:30 AM    Ketone Negative 10/26/2022 05:30 AM    Bilirubin Negative 10/26/2022 05:30 AM    Urobilinogen 0.2 10/26/2022 05:30 AM    Nitrites Negative 10/26/2022 05:30 AM    Leukocyte Esterase Negative 10/26/2022 05:30 AM    Epithelial cells FEW 10/26/2022 05:30 AM    Bacteria 1+ (A) 10/26/2022 05:30 AM    WBC 0-4 10/26/2022 05:30 AM    RBC 0-5 10/26/2022 05:30 AM     Imaging:   -- Radiology: CT C/A/P on 11/1:  IMPRESSION     1. Bilateral, diffuse airspace infiltrates consistent with multifocal pneumonia. There are small bilateral pleural effusions. 2. No acute findings in the abdomen or pelvis. 3. Gallstones.     Medications Reviewed:     Current Facility-Administered Medications   Medication Dose Route Frequency    0.9% sodium chloride infusion  50 mL/hr IntraVENous CONTINUOUS    insulin glargine (LANTUS) injection 18 Units  18 Units SubCUTAneous QHS    insulin lispro (HUMALOG) injection 4 Units  4 Units SubCUTAneous TIDAC    insulin lispro (HUMALOG) injection   SubCUTAneous AC&HS    cefepime (MAXIPIME) 1 g in 0.9% sodium chloride (MBP/ADV) 50 mL MBP  1 g IntraVENous Q24H    pantoprazole (PROTONIX) tablet 40 mg  40 mg Oral ACB    dexAMETHasone (DECADRON) tablet 6 mg  6 mg Oral DAILY    benzonatate (TESSALON) capsule 100 mg  100 mg Oral TID PRN    guaiFENesin (ROBITUSSIN) 100 mg/5 mL oral liquid 100 mg  100 mg Oral Q4H PRN    pseudoephedrine (SUDAFED) tablet 30 mg  30 mg Oral Q6H PRN    LORazepam (ATIVAN) tablet 0.5 mg  0.5 mg Oral BID PRN    albuterol-ipratropium (DUO-NEB) 2.5 MG-0.5 MG/3 ML  3 mL Nebulization Q6H PRN    acetaminophen (TYLENOL) tablet 1,000 mg  1,000 mg Oral Q6H PRN    [Held by provider] bumetanide (BUMEX) tablet 2 mg  2 mg Oral DAILY    heparin (porcine) injection 5,000 Units  5,000 Units SubCUTAneous Q8H    prochlorperazine (COMPAZINE) tablet 5 mg  5 mg Oral Q6H PRN    Or    prochlorperazine (COMPAZINE) injection 5 mg  5 mg IntraVENous Q6H PRN    NIFEdipine ER (PROCARDIA XL) tablet 60 mg  60 mg Oral BID    melatonin tablet 3 mg  3 mg Oral QHS PRN    zolpidem (AMBIEN) tablet 5 mg  5 mg Oral QHS PRN    ondansetron (ZOFRAN) injection 4 mg  4 mg IntraVENous Q6H PRN    hydrALAZINE (APRESOLINE) 20 mg/mL injection 10 mg  10 mg IntraVENous Q6H PRN    glucose chewable tablet 16 g  4 Tablet Oral PRN    glucagon (GLUCAGEN) injection 1 mg  1 mg IntraMUSCular PRN    dextrose 10 % infusion 0-250 mL  0-250 mL IntraVENous PRN    atorvastatin (LIPITOR) tablet 40 mg  40 mg Oral DAILY    carvediloL (COREG) tablet 25 mg  25 mg Oral BID WITH MEALS     ______________________________________________________________________  EXPECTED LENGTH OF STAY: 4d 7h  ACTUAL LENGTH OF STAY:          13                 Waylon Beckett MD

## 2022-11-07 NOTE — PROGRESS NOTES
2000 Received patient from [facility].    8029 Bedside shift change report given to BIBI Camacho (oncoming nurse) by Cammy Fishman RN. Report included the following information SBAR, Kardex, MAR, Recent Results, and Cardiac Rhythm NSR. Problem: Falls - Risk of  Goal: *Absence of Falls  Description: Document Reinaldo Blight Fall Risk and appropriate interventions in the flowsheet. Outcome: Progressing Towards Goal  Note: Fall Risk Interventions:  Mobility Interventions: Assess mobility with egress test         Medication Interventions: Evaluate medications/consider consulting pharmacy    Elimination Interventions: Call light in reach, Bed/chair exit alarm, Stay With Me (per policy)              Problem: Risk for Spread of Infection  Goal: Prevent transmission of infectious organism to others  Description: Prevent the transmission of infectious organisms to other patients, staff members, and visitors.   Outcome: Progressing Towards Goal     Problem: Hypertension  Goal: *Blood pressure within specified parameters  Outcome: Progressing Towards Goal  Goal: *Fluid volume balance  Outcome: Progressing Towards Goal  Goal: *Labs within defined limits  Outcome: Progressing Towards Goal

## 2022-11-07 NOTE — PROGRESS NOTES
Renal Progress Note    NAME:  Jose Ernst   :   1960   MRN:   [id number]     Date/Time:  2022 10:23 AM      Assessment:     Acute Kidney Injury --> sec to vol depletion with GI losses - Scr remains above her baseline  CKD (Stage 4/Stage 5) . Patient is aware of future need for RRT. She was already referred to [facility] educational seminar. GFR is declining due to Covid. Scr peaked and is coming down now. C Diff diarrhea, no more diarrhea  Met Acidosis - sec to CKD and partly diarrhea-improved  Hypomagnesemia  HTN-improved  DM2  Acute hypoxic resp failure, due to Covid pneumonia, the O2 requirements are down   Acute Covid 19  10. Severe anxiety. 11. Hypocalcemia from RAYMOND  Hypomagnesemia - poor oral intake       Plan:     No immediate need for RRT, but patient's renal function may worsen further as it is the case with acute Covid. Replace Ca and Mg- done by primary team  Hold bumex, change IVF to NS at 50 ml/hr  Antibiotics per primary team  Reastart Na Bicarb to 650 MG TID tomorrow if CO2 down  Replete Mg prn  Encourage oral intake. Avoid NSAIDs + IV contrast.  Dose meds for GFR. BP control  Renal panel in AM         Subjective:     10/28 feeling ok, still has diarrhea, C diff+ on po vanco, cr at baseline, Na higher-hypoglycemic this am  10/31 was hypoxic yesterday and SOB, CXR showed pulm edema vs PNA. Has productive cough, febrile. Diarrhea has stopped. Given one dose bumex and started doxy. She feels better today, requires 2 lit O2  22 Patient c/o SOB, cough, sputum production and abdominal pain. 22 patient wants to go home. She has anxiety flare up. Her SOB is improved.    feels ok, no SOB, on RA    Review of Systems:  Y  N       Y  N  []         []          Fever/chills                                               []         []          Chest Pain  [x]         []          Cough                                                       []         [] Diarrhea   [x]         []          Sputum                                                     []         []          Constipation  [x]         []          SOB/WALLACE                                                []         []          Nausea/Vomit  [x]         []          Abd Pain                                                    []         []          Tolerating PT  []         []          Dysuria                                                      []         []          Tolerating Diet     []        Unable to obtain  ROS due to  []        mental status change  []        sedated   []        intubated    Medications reviewed:  Current Facility-Administered Medications   Medication Dose Route Frequency    insulin glargine (LANTUS) injection 18 Units  18 Units SubCUTAneous QHS    insulin lispro (HUMALOG) injection 4 Units  4 Units SubCUTAneous TIDAC    insulin lispro (HUMALOG) injection   SubCUTAneous AC&HS    sodium bicarbonate (8.4%) 150 mEq in dextrose 5% 1,000 mL infusion   IntraVENous CONTINUOUS    cefepime (MAXIPIME) 1 g in 0.9% sodium chloride (MBP/ADV) 50 mL MBP  1 g IntraVENous Q24H    pantoprazole (PROTONIX) tablet 40 mg  40 mg Oral ACB    dexAMETHasone (DECADRON) tablet 6 mg  6 mg Oral DAILY    benzonatate (TESSALON) capsule 100 mg  100 mg Oral TID PRN    guaiFENesin (ROBITUSSIN) 100 mg/5 mL oral liquid 100 mg  100 mg Oral Q4H PRN    pseudoephedrine (SUDAFED) tablet 30 mg  30 mg Oral Q6H PRN    LORazepam (ATIVAN) tablet 0.5 mg  0.5 mg Oral BID PRN    albuterol-ipratropium (DUO-NEB) 2.5 MG-0.5 MG/3 ML  3 mL Nebulization Q6H PRN    acetaminophen (TYLENOL) tablet 1,000 mg  1,000 mg Oral Q6H PRN    bumetanide (BUMEX) tablet 2 mg  2 mg Oral DAILY    heparin (porcine) injection 5,000 Units  5,000 Units SubCUTAneous Q8H    prochlorperazine (COMPAZINE) tablet 5 mg  5 mg Oral Q6H PRN    Or    prochlorperazine (COMPAZINE) injection 5 mg  5 mg IntraVENous Q6H PRN    NIFEdipine ER (PROCARDIA XL) tablet 60 mg  60 mg Oral BID    melatonin tablet 3 mg  3 mg Oral QHS PRN    zolpidem (AMBIEN) tablet 5 mg  5 mg Oral QHS PRN    ondansetron (ZOFRAN) injection 4 mg  4 mg IntraVENous Q6H PRN    hydrALAZINE (APRESOLINE) 20 mg/mL injection 10 mg  10 mg IntraVENous Q6H PRN    glucose chewable tablet 16 g  4 Tablet Oral PRN    glucagon (GLUCAGEN) injection 1 mg  1 mg IntraMUSCular PRN    dextrose 10 % infusion 0-250 mL  0-250 mL IntraVENous PRN    atorvastatin (LIPITOR) tablet 40 mg  40 mg Oral DAILY    carvediloL (COREG) tablet 25 mg  25 mg Oral BID WITH MEALS        Objective:   Vitals:  Visit Vitals  BP (!) 159/78 (BP 1 Location: Right upper arm, BP Patient Position: Sitting)   Pulse 81   Temp 98.2 °F (36.8 °C)   Resp 20   Ht 5' 4\" (1.626 m)   Wt 85 kg (187 lb 6.3 oz)   LMP 10/07/2012   SpO2 100%   BMI 32.17 kg/m²     Temp (24hrs), Av.2 °F (36.8 °C), Min:98.1 °F (36.7 °C), Max:98.4 °F (36.9 °C)    O2 Flow Rate (L/min): 1 l/min O2 Device: None (Room air)    Last 24hr Input/Output:    Intake/Output Summary (Last 24 hours) at 2022 1001  Last data filed at 2022 0430  Gross per 24 hour   Intake 775 ml   Output --   Net 775 ml          PHYSICAL EXAM:      General:    Awake and alert, uncomfortable. Eyes:   No icterus    Lungs:   CTA    Heart:   RRR, No S 3 gallop, no pericardial rub  . Abdomen:   Not distended. Tender    Extremities: Trace edema    Psych:  Calm    Neurologic: Responding appropriately.         []        Telemetry Reviewed     []        NSR []        PAC/PVCs   []        Afib  []        Paced   []        NSVT   []        Hough []        NGT  []        Intubated on vent    Lab Data Reviewed:    Recent Results (from the past 24 hour(s))   GLUCOSE, POC    Collection Time: 22 11:53 AM   Result Value Ref Range    Glucose (POC) 144 (H) 65 - 117 mg/dL    Performed by [facility], POC    Collection Time: 22  3:43 PM   Result Value Ref Range    Glucose (POC) 247 (H) 65 - 117 mg/dL Performed by Andie WANG    GLUCOSE, POC    Collection Time: 11/06/22  9:13 PM   Result Value Ref Range    Glucose (POC) 250 (H) 65 - 117 mg/dL    Performed by Jason Maxwell    CBC WITH AUTOMATED DIFF    Collection Time: 11/07/22  5:08 AM   Result Value Ref Range    WBC 9.5 3.6 - 11.0 K/uL    RBC 3.01 (L) 3.80 - 5.20 M/uL    HGB 8.0 (L) 11.5 - 16.0 g/dL    HCT 25.5 (L) 35.0 - 47.0 %    MCV 84.7 80.0 - 99.0 FL    MCH 26.6 26.0 - 34.0 PG    MCHC 31.4 30.0 - 36.5 g/dL    RDW 14.8 (H) 11.5 - 14.5 %    PLATELET 380 140 - 816 K/uL    MPV 11.8 8.9 - 12.9 FL    NRBC 0.0 0  WBC    ABSOLUTE NRBC 0.00 0.00 - 0.01 K/uL    NEUTROPHILS 82 (H) 32 - 75 %    LYMPHOCYTES 10 (L) 12 - 49 %    MONOCYTES 6 5 - 13 %    EOSINOPHILS 0 0 - 7 %    BASOPHILS 0 0 - 1 %    IMMATURE GRANULOCYTES 2 (H) 0.0 - 0.5 %    ABS. NEUTROPHILS 7.8 1.8 - 8.0 K/UL    ABS. LYMPHOCYTES 1.0 0.8 - 3.5 K/UL    ABS. MONOCYTES 0.5 0.0 - 1.0 K/UL    ABS. EOSINOPHILS 0.0 0.0 - 0.4 K/UL    ABS. BASOPHILS 0.0 0.0 - 0.1 K/UL    ABS. IMM.  GRANS. 0.2 (H) 0.00 - 0.04 K/UL    DF AUTOMATED     MAGNESIUM    Collection Time: 11/07/22  5:08 AM   Result Value Ref Range    Magnesium 2.0 1.6 - 2.4 mg/dL   RENAL FUNCTION PANEL    Collection Time: 11/07/22  5:08 AM   Result Value Ref Range    Sodium 140 136 - 145 mmol/L    Potassium 3.4 (L) 3.5 - 5.1 mmol/L    Chloride 102 97 - 108 mmol/L    CO2 28 21 - 32 mmol/L    Anion gap 10 5 - 15 mmol/L    Glucose 131 (H) 65 - 100 mg/dL    BUN 70 (H) 6 - 20 MG/DL    Creatinine 6.72 (H) 0.55 - 1.02 MG/DL    BUN/Creatinine ratio 10 (L) 12 - 20      eGFR 7 (L) >60 ml/min/1.73m2    Calcium 6.7 (L) 8.5 - 10.1 MG/DL    Phosphorus 3.6 2.6 - 4.7 MG/DL    Albumin 2.6 (L) 3.5 - 5.0 g/dL   GLUCOSE, POC    Collection Time: 11/07/22  7:31 AM   Result Value Ref Range    Glucose (POC) 113 65 - 117 mg/dL    Performed by Jason Maxwell        Total time spent with patient:  []        15   []        25   []        35   []         __ minutes    []        Critical Care Provided    Care Plan discussed with:    [x]        Patient   []        Family    []        Care Manager   []        Consultant/Specialist :      []          >50% of visit spent in counseling and coordination of care   (Discussed []        CODE status,  []        Care Plan, []        D/C Planning)    ___________________________________________________    Attending Physician: Kimberley Bernstein MD

## 2022-11-08 LAB
ALBUMIN SERPL-MCNC: 2.9 G/DL (ref 3.5–5)
ALBUMIN/GLOB SERPL: 0.7 {RATIO} (ref 1.1–2.2)
ALP SERPL-CCNC: 80 U/L (ref 45–117)
ALT SERPL-CCNC: 36 U/L (ref 12–78)
ANION GAP SERPL CALC-SCNC: 5 MMOL/L (ref 5–15)
AST SERPL-CCNC: 37 U/L (ref 15–37)
BASOPHILS # BLD: 0 K/UL (ref 0–0.1)
BASOPHILS NFR BLD: 0 % (ref 0–1)
BILIRUB SERPL-MCNC: 0.4 MG/DL (ref 0.2–1)
BUN SERPL-MCNC: 69 MG/DL (ref 6–20)
BUN/CREAT SERPL: 11 (ref 12–20)
CALCIUM SERPL-MCNC: 7.2 MG/DL (ref 8.5–10.1)
CHLORIDE SERPL-SCNC: 102 MMOL/L (ref 97–108)
CO2 SERPL-SCNC: 30 MMOL/L (ref 21–32)
CREAT SERPL-MCNC: 6.19 MG/DL (ref 0.55–1.02)
DIFFERENTIAL METHOD BLD: ABNORMAL
EOSINOPHIL # BLD: 0 K/UL (ref 0–0.4)
EOSINOPHIL NFR BLD: 0 % (ref 0–7)
ERYTHROCYTE [DISTWIDTH] IN BLOOD BY AUTOMATED COUNT: 15.1 % (ref 11.5–14.5)
GLOBULIN SER CALC-MCNC: 4.4 G/DL (ref 2–4)
GLUCOSE BLD STRIP.AUTO-MCNC: 137 MG/DL (ref 65–117)
GLUCOSE BLD STRIP.AUTO-MCNC: 255 MG/DL (ref 65–117)
GLUCOSE BLD STRIP.AUTO-MCNC: 266 MG/DL (ref 65–117)
GLUCOSE BLD STRIP.AUTO-MCNC: 61 MG/DL (ref 65–117)
GLUCOSE SERPL-MCNC: 75 MG/DL (ref 65–100)
HCT VFR BLD AUTO: 27.8 % (ref 35–47)
HGB BLD-MCNC: 8.7 G/DL (ref 11.5–16)
IMM GRANULOCYTES # BLD AUTO: 0 K/UL
IMM GRANULOCYTES NFR BLD AUTO: 0 %
LYMPHOCYTES # BLD: 1.6 K/UL (ref 0.8–3.5)
LYMPHOCYTES NFR BLD: 15 % (ref 12–49)
MAGNESIUM SERPL-MCNC: 1.8 MG/DL (ref 1.6–2.4)
MCH RBC QN AUTO: 26.4 PG (ref 26–34)
MCHC RBC AUTO-ENTMCNC: 31.3 G/DL (ref 30–36.5)
MCV RBC AUTO: 84.5 FL (ref 80–99)
METAMYELOCYTES NFR BLD MANUAL: 2 %
MONOCYTES # BLD: 0.3 K/UL (ref 0–1)
MONOCYTES NFR BLD: 3 % (ref 5–13)
NEUTS SEG # BLD: 8.6 K/UL (ref 1.8–8)
NEUTS SEG NFR BLD: 80 % (ref 32–75)
NRBC # BLD: 0 K/UL (ref 0–0.01)
NRBC BLD-RTO: 0 PER 100 WBC
PHOSPHATE SERPL-MCNC: 3.1 MG/DL (ref 2.6–4.7)
PLATELET # BLD AUTO: 303 K/UL (ref 150–400)
PMV BLD AUTO: 11.4 FL (ref 8.9–12.9)
POTASSIUM SERPL-SCNC: 3.5 MMOL/L (ref 3.5–5.1)
PROT SERPL-MCNC: 7.3 G/DL (ref 6.4–8.2)
RBC # BLD AUTO: 3.29 M/UL (ref 3.8–5.2)
RBC MORPH BLD: ABNORMAL
RBC MORPH BLD: ABNORMAL
SERVICE CMNT-IMP: ABNORMAL
SODIUM SERPL-SCNC: 137 MMOL/L (ref 136–145)
WBC # BLD AUTO: 10.8 K/UL (ref 3.6–11)

## 2022-11-08 PROCEDURE — 97535 SELF CARE MNGMENT TRAINING: CPT

## 2022-11-08 PROCEDURE — 83735 ASSAY OF MAGNESIUM: CPT

## 2022-11-08 PROCEDURE — 82962 GLUCOSE BLOOD TEST: CPT

## 2022-11-08 PROCEDURE — 94760 N-INVAS EAR/PLS OXIMETRY 1: CPT

## 2022-11-08 PROCEDURE — 97110 THERAPEUTIC EXERCISES: CPT

## 2022-11-08 PROCEDURE — 74011636637 HC RX REV CODE- 636/637: Performed by: INTERNAL MEDICINE

## 2022-11-08 PROCEDURE — 74011250637 HC RX REV CODE- 250/637: Performed by: INTERNAL MEDICINE

## 2022-11-08 PROCEDURE — 65660000001 HC RM ICU INTERMED STEPDOWN

## 2022-11-08 PROCEDURE — 74011250637 HC RX REV CODE- 250/637: Performed by: FAMILY MEDICINE

## 2022-11-08 PROCEDURE — 74011636637 HC RX REV CODE- 636/637: Performed by: NURSE PRACTITIONER

## 2022-11-08 PROCEDURE — 97116 GAIT TRAINING THERAPY: CPT

## 2022-11-08 PROCEDURE — 97161 PT EVAL LOW COMPLEX 20 MIN: CPT

## 2022-11-08 PROCEDURE — 80053 COMPREHEN METABOLIC PANEL: CPT

## 2022-11-08 PROCEDURE — 36415 COLL VENOUS BLD VENIPUNCTURE: CPT

## 2022-11-08 PROCEDURE — 84100 ASSAY OF PHOSPHORUS: CPT

## 2022-11-08 PROCEDURE — 74011250637 HC RX REV CODE- 250/637: Performed by: STUDENT IN AN ORGANIZED HEALTH CARE EDUCATION/TRAINING PROGRAM

## 2022-11-08 PROCEDURE — 74011250636 HC RX REV CODE- 250/636: Performed by: PHYSICIAN ASSISTANT

## 2022-11-08 PROCEDURE — 97165 OT EVAL LOW COMPLEX 30 MIN: CPT

## 2022-11-08 PROCEDURE — 85025 COMPLETE CBC W/AUTO DIFF WBC: CPT

## 2022-11-08 PROCEDURE — 74011250636 HC RX REV CODE- 250/636: Performed by: NURSE PRACTITIONER

## 2022-11-08 PROCEDURE — 74011250637 HC RX REV CODE- 250/637: Performed by: PHYSICIAN ASSISTANT

## 2022-11-08 PROCEDURE — 74011250637 HC RX REV CODE- 250/637: Performed by: HOSPITALIST

## 2022-11-08 PROCEDURE — 74011250637 HC RX REV CODE- 250/637: Performed by: NURSE PRACTITIONER

## 2022-11-08 PROCEDURE — 74011250637 HC RX REV CODE- 250/637

## 2022-11-08 RX ORDER — CHOLESTYRAMINE 4 G/4.8G
2 POWDER, FOR SUSPENSION ORAL 2 TIMES DAILY WITH MEALS
Status: DISCONTINUED | OUTPATIENT
Start: 2022-11-08 | End: 2022-11-09 | Stop reason: HOSPADM

## 2022-11-08 RX ORDER — HYDRALAZINE HYDROCHLORIDE 20 MG/ML
20 INJECTION INTRAMUSCULAR; INTRAVENOUS ONCE
Status: COMPLETED | OUTPATIENT
Start: 2022-11-08 | End: 2022-11-08

## 2022-11-08 RX ORDER — HYDRALAZINE HYDROCHLORIDE 50 MG/1
50 TABLET, FILM COATED ORAL 3 TIMES DAILY
Status: DISCONTINUED | OUTPATIENT
Start: 2022-11-08 | End: 2022-11-09 | Stop reason: HOSPADM

## 2022-11-08 RX ORDER — HYDRALAZINE HYDROCHLORIDE 25 MG/1
25 TABLET, FILM COATED ORAL 3 TIMES DAILY
Status: DISCONTINUED | OUTPATIENT
Start: 2022-11-08 | End: 2022-11-08

## 2022-11-08 RX ADMIN — ATORVASTATIN CALCIUM 40 MG: 40 TABLET, FILM COATED ORAL at 09:26

## 2022-11-08 RX ADMIN — ZOLPIDEM TARTRATE 5 MG: 5 TABLET ORAL at 21:13

## 2022-11-08 RX ADMIN — LORAZEPAM 0.5 MG: 0.5 TABLET ORAL at 05:15

## 2022-11-08 RX ADMIN — CARVEDILOL 25 MG: 12.5 TABLET, FILM COATED ORAL at 09:26

## 2022-11-08 RX ADMIN — CHOLESTYRAMINE 2 G: 4 POWDER, FOR SUSPENSION ORAL at 19:38

## 2022-11-08 RX ADMIN — LORAZEPAM 0.5 MG: 0.5 TABLET ORAL at 13:07

## 2022-11-08 RX ADMIN — CALCIUM CARBONATE (ANTACID) CHEW TAB 500 MG 200 MG: 500 CHEW TAB at 17:05

## 2022-11-08 RX ADMIN — CALCIUM CARBONATE (ANTACID) CHEW TAB 500 MG 200 MG: 500 CHEW TAB at 11:01

## 2022-11-08 RX ADMIN — BENZONATATE 100 MG: 100 CAPSULE ORAL at 09:26

## 2022-11-08 RX ADMIN — Medication 4 UNITS: at 17:19

## 2022-11-08 RX ADMIN — ACETAMINOPHEN 1000 MG: 500 TABLET ORAL at 01:31

## 2022-11-08 RX ADMIN — DEXAMETHASONE 6 MG: 4 TABLET ORAL at 09:27

## 2022-11-08 RX ADMIN — HYDRALAZINE HYDROCHLORIDE 25 MG: 25 TABLET, FILM COATED ORAL at 09:26

## 2022-11-08 RX ADMIN — NIFEDIPINE 60 MG: 60 TABLET, EXTENDED RELEASE ORAL at 17:06

## 2022-11-08 RX ADMIN — HYDRALAZINE HYDROCHLORIDE 20 MG: 20 INJECTION INTRAMUSCULAR; INTRAVENOUS at 03:57

## 2022-11-08 RX ADMIN — HYDRALAZINE HYDROCHLORIDE 50 MG: 50 TABLET, FILM COATED ORAL at 21:13

## 2022-11-08 RX ADMIN — Medication 3 UNITS: at 21:13

## 2022-11-08 RX ADMIN — NIFEDIPINE 60 MG: 60 TABLET, EXTENDED RELEASE ORAL at 09:26

## 2022-11-08 RX ADMIN — HEPARIN SODIUM 5000 UNITS: 5000 INJECTION INTRAVENOUS; SUBCUTANEOUS at 01:31

## 2022-11-08 RX ADMIN — PANTOPRAZOLE SODIUM 40 MG: 40 TABLET, DELAYED RELEASE ORAL at 07:26

## 2022-11-08 RX ADMIN — CALCIUM CARBONATE (ANTACID) CHEW TAB 500 MG 200 MG: 500 CHEW TAB at 09:26

## 2022-11-08 RX ADMIN — HYDRALAZINE HYDROCHLORIDE 50 MG: 50 TABLET, FILM COATED ORAL at 17:06

## 2022-11-08 RX ADMIN — CARVEDILOL 25 MG: 12.5 TABLET, FILM COATED ORAL at 17:05

## 2022-11-08 RX ADMIN — Medication 5 UNITS: at 17:18

## 2022-11-08 RX ADMIN — INSULIN GLARGINE 18 UNITS: 100 INJECTION, SOLUTION SUBCUTANEOUS at 21:13

## 2022-11-08 RX ADMIN — HEPARIN SODIUM 5000 UNITS: 5000 INJECTION INTRAVENOUS; SUBCUTANEOUS at 17:06

## 2022-11-08 RX ADMIN — HEPARIN SODIUM 5000 UNITS: 5000 INJECTION INTRAVENOUS; SUBCUTANEOUS at 09:32

## 2022-11-08 NOTE — PROGRESS NOTES
Problem: Mobility Impaired (Adult and Pediatric)  Goal: *Acute Goals and Plan of Care (Insert Text)  Description: FUNCTIONAL STATUS PRIOR TO ADMISSION: Patient was independent without use of DME. She lives alone in one level home with 4-5 EWA . She has a history of prior CVA with cane, RW, shower chair available if needed. HOME SUPPORT PRIOR TO ADMISSION: The patient lived alone. Physical Therapy Goals  Initiated 11/8/2022  1. Patient will move from supine to sit and sit to supine  in bed with modified independence within 7 day(s). 2.  Patient will transfer from bed to chair and chair to bed with modified independence using the least restrictive device within 7 day(s). 3.  Patient will perform sit to stand with modified independence within 7 day(s). 4.  Patient will ambulate with modified independence for 150 feet with the least restrictive device within 7 day(s). 5.  Patient will ascend/descend 5 stairs with handrail(s) with modified independence within 7 day(s). Outcome: Not Met     PHYSICAL THERAPY EVALUATION  Patient: Jose Ernst (58 y.o. female)  Date: 11/8/2022  Primary Diagnosis: RAYMOND (acute kidney injury) ([facility] [address] [N17.9]  Diarrhea [R19.7]       Precautions:  Fall    ASSESSMENT  Based on the objective data described below, the patient presents with generalized weakness, impaired balance, and decreased activity tolerance s/p admission with RAYMOND, Covid+. Received pt sitting in the chair. She ambulated 70 ft and 40 ft in the room with the RW with slow steady gait, 4 standing rest breaks, one seated rest.   At baseline pt ambulates w/o a device, lives alone. She may benefit from acute therapy to improve her activity tolerance working towards return home. Current Level of Function Impacting Discharge (mobility/balance): Supervision sit<->stand; CGA ambulating with RW (distance limited by fatigue)    Functional Outcome Measure:   The patient scored 70/100 on the Barthel outcome measure. Other factors to consider for discharge: lives alone, covid     Patient will benefit from skilled therapy intervention to address the above noted impairments. PLAN :  Recommendations and Planned Interventions: bed mobility training, transfer training, gait training, therapeutic exercises, neuromuscular re-education, patient and family training/education, and therapeutic activities      Frequency/Duration: Patient will be followed by physical therapy:  3 times a week to address goals. Recommendation for discharge: (in order for the patient to meet his/her long term goals)  To be determined: Likely HHPT    This discharge recommendation:  Has not yet been discussed the attending provider and/or case management    IF patient discharges home will need the following DME: patient owns DME required for discharge         SUBJECTIVE:   Patient with flat affect, did not engage in conversation.     OBJECTIVE DATA SUMMARY:   HISTORY:    Past Medical History:   Diagnosis Date    Diabetes ([facility] [address]     Diabetes Mellitus 04/25/2011    Diabetes Mellitus 04/25/2011    GERG - Esophagitis- reflux 04/25/2011    Hypercholesteremia 04/25/2011    Hypercholesterolemia 04/25/2011    Hypertension 04/25/2011    Intertrigo 04/25/2011    Kidney disease     Leiomyoma of uterus, unspecified 04/25/2011    Unspecified visual loss 04/25/2011    rt eye     Past Surgical History:   Procedure Laterality Date    COLONOSCOPY N/A 8/23/2022    COLONOSCOPY performed by Kristine Lyons MD at [facility] ENDOSCOPY       Personal factors and/or comorbidities impacting plan of care: [facility]    Home Situation  Home Environment: Private residence  # Steps to Enter: 5  Rails to Enter: Yes  Hand Rails : Bilateral  One/Two Story Residence: One story  Living Alone: Yes  Support Systems: Child(jhonny)  Patient Expects to be Discharged to[de-identified] Home  Current DME Used/Available at Home:  (has RW. cane, shower chair)  Tub or Shower Type: Tub/Shower combination    EXAMINATION/PRESENTATION/DECISION MAKING:   Critical Behavior:  Neurologic State: Alert  Orientation Level: Oriented X4  Cognition: Follows commands     Hearing: Auditory  Auditory Impairment: None    Range Of Motion:  AROM: Within functional limits                       Strength:    Strength: Generally decreased, functional                    Tone & Sensation:   Tone: Normal                              Coordination:  Coordination: Within functional limits    Functional Mobility:  Bed Mobility:  Received sitting up in the chair (no chair alarm). Remained in the chair post session. Transfers:  Sit to Stand: Supervision  Stand to Sit: Supervision (hands remained on the walker)        Bed to Chair: Supervision  Tx: Cues for hand placement when returning to sit. Balance:   Sitting: Intact; Without support  Standing: Impaired; Without support  Standing - Static: Good; Unsupported  Standing - Dynamic : Fair;Constant support  Ambulation/Gait Training:  Distance (ft): 70 Feet (ft) (4 standing rest breaks and again 40 ft w/ stoppping to rest)  Assistive Device: Walker, rolling  Ambulation - Level of Assistance: Contact guard assistance;Assist x1;Additional time; Adaptive equipment        Gait Abnormalities: Decreased step clearance        Base of Support: Widened     Speed/Katalina: Pace decreased (<100 feet/min)  Step Length: Right shortened;Left shortened        Tx: ambulates with extremely shortened strides, cues for rest breaks and strides. Functional Measure:  Barthel Index:    Bathin  Bladder: 10  Bowels: 10  Groomin  Dressing: 10  Feeding: 10  Mobility: 5  Stairs: 5  Toilet Use: 5  Transfer (Bed to Chair and Back): 10  Total: 70/100       The Barthel ADL Index: Guidelines  1. The index should be used as a record of what a patient does, not as a record of what a patient could do.   2. The main aim is to establish degree of independence from any help, physical or verbal, however minor and for whatever reason. 3. The need for supervision renders the patient not independent. 4. A patient's performance should be established using the best available evidence. Asking the patient, friends/relatives and nurses are the usual sources, but direct observation and common sense are also important. However direct testing is not needed. 5. Usually the patient's performance over the preceding 24-48 hours is important, but occasionally longer periods will be relevant. 6. Middle categories imply that the patient supplies over 50 per cent of the effort. 7. Use of aids to be independent is allowed. Score Interpretation (from [address] [facility] [address])    Independent   60-79 Minimally independent   40-59 Partially dependent   20-39 Very dependent   <20 Totally dependent     -Jamie Rendon., Barthel, DManuelW. (1965). Functional evaluation: the Barthel Index. [address] [facility] ([address] (1997). The Barthel activities of daily living index: self-reporting versus actual performance in the old (> or = 75 years). Journal [address] 45(7), 14 [facility], JManuelManuelManuel, Filemon Ceja., Anabel Mann. (1999). Measuring the change in disability after inpatient rehabilitation; comparison of the responsiveness of the Barthel Index and Functional Wilkes Measure. Journal of Neurology, Neurosurgery, and Psychiatry, 66(4), 721-453. CHRIS Rice, TAMMY Alba, & John Martinez MManuelA. (2004) Assessment of post-stroke quality of life in cost-effectiveness studies: The usefulness of the Barthel Index and the EuroQoL-5D.  Quality of Life Research, 15, 261-18            Physical Therapy Evaluation Charge Determination   History Examination Presentation Decision-Making   MEDIUM  Complexity : 1-2 comorbidities / personal factors will impact the outcome/ POC  LOW Complexity : 1-2 Standardized tests and measures addressing body structure, function, activity limitation and / or participation in recreation  LOW Complexity : Stable, uncomplicated  LOW Complexity : FOTO score of       Based on the above components, the patient evaluation is determined to be of the following complexity level: LOW     Pain Rating:  None indicated    Activity Tolerance:   Fair, SpO2 stable on RA, and requires rest breaks    After treatment patient left in no apparent distress:   Sitting in chair and Call bell within reach     COMMUNICATION/EDUCATION:   The patients plan of care was discussed with: Registered nurse. Fall prevention education was provided and the patient/caregiver indicated understanding., Patient/family have participated as able in goal setting and plan of care. , and Patient/family agree to work toward stated goals and plan of care.     Thank you for this referral.  Zach West, PT   Time Calculation: 21 mins

## 2022-11-08 NOTE — PROGRESS NOTES
Problem: Falls - Risk of  Goal: *Absence of Falls  Description: Document Nancy Carreno Fall Risk and appropriate interventions in the flowsheet. Outcome: Progressing Towards Goal  Note: Fall Risk Interventions:  Mobility Interventions: Patient to call before getting OOB         Medication Interventions: Patient to call before getting OOB, Teach patient to arise slowly    Elimination Interventions: Call light in reach              Problem: Patient Education: Go to Patient Education Activity  Goal: Patient/Family Education  Outcome: Progressing Towards Goal     Problem: Risk for Spread of Infection  Goal: Prevent transmission of infectious organism to others  Description: Prevent the transmission of infectious organisms to other patients, staff members, and visitors.   Outcome: Progressing Towards Goal     Problem: Patient Education:  Go to Education Activity  Goal: Patient/Family Education  Outcome: Progressing Towards Goal     Problem: Hypertension  Goal: *Blood pressure within specified parameters  Outcome: Progressing Towards Goal  Goal: *Fluid volume balance  Outcome: Progressing Towards Goal  Goal: *Labs within defined limits  Outcome: Progressing Towards Goal     Problem: Patient Education: Go to Patient Education Activity  Goal: Patient/Family Education  Outcome: Progressing Towards Goal     Problem: Diarrhea (Adult and Pediatrics)  Goal: *Absence of diarrhea  Outcome: Progressing Towards Goal  Goal: *PALLIATIVE CARE:  Absence of diarrhea  Outcome: Progressing Towards Goal     Problem: Patient Education: Go to Patient Education Activity  Goal: Patient/Family Education  Outcome: Progressing Towards Goal     Problem: Patient Education: Go to Patient Education Activity  Goal: Patient/Family Education  Outcome: Progressing Towards Goal

## 2022-11-08 NOTE — PROGRESS NOTES
Renal Progress Note    NAME:  Hector Notice   :   1960   MRN:   [id number]     Date/Time:  2022 10:23 AM      Assessment:     Acute Kidney Injury --> sec to vol depletion with GI losses - Scr remains above her baseline  CKD (Stage 4/Stage 5) . Patient is aware of future need for RRT. She was already referred to [facility] educational seminar. GFR is declining due to Covid. Scr peaked and is coming down now. C Diff diarrhea, still has diarrhea  Met Acidosis - sec to CKD and partly diarrhea-improved, off bicarb drip, CO2 high  Hypomagnesemia  HTN-iBPs elevated  DM2  Acute hypoxic resp failure, due to Covid pneumonia, the O2 requirements are down   Acute Covid 19  Severe anxiety. Hypocalcemia from RAYMOND  Hypomagnesemia - poor oral intake  Edema       Plan:     No immediate need for RRT, but patient's renal function may worsen further as it is the case with acute Covid. Replace Ca and Mg-prn  Hold bumex, DC IVF, increase edema and HTn  Antibiotics per primary team-still has diarrhea  Reastart Na Bicarb po when CO2 down  Replete Mg prn  Increase hydralazine to 50 mg tid  Encourage oral intake. Avoid NSAIDs + IV contrast.  Dose meds for GFR. BP control  Renal panel in AM         Subjective:     10/28 feeling ok, still has diarrhea, C diff+ on po vanco, cr at baseline, Na higher-hypoglycemic this am  10/31 was hypoxic yesterday and SOB, CXR showed pulm edema vs PNA. Has productive cough, febrile. Diarrhea has stopped. Given one dose bumex and started doxy. She feels better today, requires 2 lit O2  22 Patient c/o SOB, cough, sputum production and abdominal pain. 22 patient wants to go home. She has anxiety flare up. Her SOB is improved.    feels ok, no SOB, on RA  , no complaint, still has diarrhea, no SOB, on RA    Review of Systems:  Y  N       Y  N  []         []          Fever/chills                                               []         []          Chest Pain  [x]         []          Cough                                                       []         []          Diarrhea   [x]         []          Sputum                                                     []         []          Constipation  [x]         []          SOB/WALLACE                                                []         []          Nausea/Vomit  [x]         []          Abd Pain                                                    []         []          Tolerating PT  []         []          Dysuria                                                      []         []          Tolerating Diet     []        Unable to obtain  ROS due to  []        mental status change  []        sedated   []        intubated    Medications reviewed:  Current Facility-Administered Medications   Medication Dose Route Frequency    hydrALAZINE (APRESOLINE) tablet 25 mg  25 mg Oral TID    0.9% sodium chloride infusion  50 mL/hr IntraVENous CONTINUOUS    calcium carbonate (TUMS) chewable tablet 200 mg [elemental]  200 mg Oral TID WITH MEALS    insulin glargine (LANTUS) injection 18 Units  18 Units SubCUTAneous QHS    insulin lispro (HUMALOG) injection 4 Units  4 Units SubCUTAneous TIDAC    insulin lispro (HUMALOG) injection   SubCUTAneous AC&HS    pantoprazole (PROTONIX) tablet 40 mg  40 mg Oral ACB    dexAMETHasone (DECADRON) tablet 6 mg  6 mg Oral DAILY    benzonatate (TESSALON) capsule 100 mg  100 mg Oral TID PRN    guaiFENesin (ROBITUSSIN) 100 mg/5 mL oral liquid 100 mg  100 mg Oral Q4H PRN    pseudoephedrine (SUDAFED) tablet 30 mg  30 mg Oral Q6H PRN    LORazepam (ATIVAN) tablet 0.5 mg  0.5 mg Oral BID PRN    albuterol-ipratropium (DUO-NEB) 2.5 MG-0.5 MG/3 ML  3 mL Nebulization Q6H PRN    acetaminophen (TYLENOL) tablet 1,000 mg  1,000 mg Oral Q6H PRN    [Held by provider] bumetanide (BUMEX) tablet 2 mg  2 mg Oral DAILY    heparin (porcine) injection 5,000 Units  5,000 Units SubCUTAneous Q8H    prochlorperazine (COMPAZINE) tablet 5 mg  5 mg Oral Q6H PRN    Or    prochlorperazine (COMPAZINE) injection 5 mg  5 mg IntraVENous Q6H PRN    NIFEdipine ER (PROCARDIA XL) tablet 60 mg  60 mg Oral BID    melatonin tablet 3 mg  3 mg Oral QHS PRN    zolpidem (AMBIEN) tablet 5 mg  5 mg Oral QHS PRN    ondansetron (ZOFRAN) injection 4 mg  4 mg IntraVENous Q6H PRN    hydrALAZINE (APRESOLINE) 20 mg/mL injection 10 mg  10 mg IntraVENous Q6H PRN    glucose chewable tablet 16 g  4 Tablet Oral PRN    glucagon (GLUCAGEN) injection 1 mg  1 mg IntraMUSCular PRN    dextrose 10 % infusion 0-250 mL  0-250 mL IntraVENous PRN    atorvastatin (LIPITOR) tablet 40 mg  40 mg Oral DAILY    carvediloL (COREG) tablet 25 mg  25 mg Oral BID WITH MEALS        Objective:   Vitals:  Visit Vitals  BP (!) 175/54 (BP 1 Location: Right upper arm)   Pulse 70   Temp 98.2 °F (36.8 °C)   Resp 18   Ht 5' 4\" (1.626 m)   Wt 85 kg (187 lb 6.3 oz)   LMP 10/07/2012   SpO2 100%   BMI 32.17 kg/m²     Temp (24hrs), Av °F (36.7 °C), Min:97.5 °F (36.4 °C), Max:98.3 °F (36.8 °C)    O2 Flow Rate (L/min): 1 l/min O2 Device: None (Room air)    Last 24hr Input/Output:  No intake or output data in the 24 hours ending 22 1004       PHYSICAL EXAM:      General:    Awake and alert, uncomfortable. Eyes:   No icterus    Lungs:   CTA    Heart:   RRR, No S 3 gallop, no pericardial rub  . Abdomen:   Not distended. Not tender    Extremities: 2+ edema    Psych:  Calm    Neurologic: Responding appropriately.         []        Telemetry Reviewed     []        NSR []        PAC/PVCs   []        Afib  []        Paced   []        NSVT   []        Hough []        NGT  []        Intubated on vent    Lab Data Reviewed:    Recent Results (from the past 24 hour(s))   GLUCOSE, POC    Collection Time: 22 12:37 PM   Result Value Ref Range    Glucose (POC) 171 (H) 65 - 117 mg/dL    Performed by [address], POC    Collection Time: 22  4:13 PM   Result Value Ref Range    Glucose (POC) 151 (H) 65 - 117 mg/dL    Performed by Prince Love POC    Collection Time: 11/07/22  9:14 PM   Result Value Ref Range    Glucose (POC) 149 (H) 65 - 117 mg/dL    Performed by Rose Calix    CBC WITH AUTOMATED DIFF    Collection Time: 11/08/22  5:21 AM   Result Value Ref Range    WBC 10.8 3.6 - 11.0 K/uL    RBC 3.29 (L) 3.80 - 5.20 M/uL    HGB 8.7 (L) 11.5 - 16.0 g/dL    HCT 27.8 (L) 35.0 - 47.0 %    MCV 84.5 80.0 - 99.0 FL    MCH 26.4 26.0 - 34.0 PG    MCHC 31.3 30.0 - 36.5 g/dL    RDW 15.1 (H) 11.5 - 14.5 %    PLATELET 789 056 - 113 K/uL    MPV 11.4 8.9 - 12.9 FL    NRBC 0.0 0  WBC    ABSOLUTE NRBC 0.00 0.00 - 0.01 K/uL    NEUTROPHILS 80 (H) 32 - 75 %    LYMPHOCYTES 15 12 - 49 %    MONOCYTES 3 (L) 5 - 13 %    EOSINOPHILS 0 0 - 7 %    BASOPHILS 0 0 - 1 %    METAMYELOCYTES 2 (H) 0 %    IMMATURE GRANULOCYTES 0 %    ABS. NEUTROPHILS 8.6 (H) 1.8 - 8.0 K/UL    ABS. LYMPHOCYTES 1.6 0.8 - 3.5 K/UL    ABS. MONOCYTES 0.3 0.0 - 1.0 K/UL    ABS. EOSINOPHILS 0.0 0.0 - 0.4 K/UL    ABS. BASOPHILS 0.0 0.0 - 0.1 K/UL    ABS. IMM. GRANS. 0.0 K/UL    DF MANUAL      RBC COMMENTS ANISOCYTOSIS  1+        RBC COMMENTS OVALOCYTES  1+       MAGNESIUM    Collection Time: 11/08/22  5:21 AM   Result Value Ref Range    Magnesium 1.8 1.6 - 2.4 mg/dL   PHOSPHORUS    Collection Time: 11/08/22  5:21 AM   Result Value Ref Range    Phosphorus 3.1 2.6 - 4.7 MG/DL   METABOLIC PANEL, COMPREHENSIVE    Collection Time: 11/08/22  5:21 AM   Result Value Ref Range    Sodium 137 136 - 145 mmol/L    Potassium 3.5 3.5 - 5.1 mmol/L    Chloride 102 97 - 108 mmol/L    CO2 30 21 - 32 mmol/L    Anion gap 5 5 - 15 mmol/L    Glucose 75 65 - 100 mg/dL    BUN 69 (H) 6 - 20 MG/DL    Creatinine 6.19 (H) 0.55 - 1.02 MG/DL    BUN/Creatinine ratio 11 (L) 12 - 20      eGFR 7 (L) >60 ml/min/1.73m2    Calcium 7.2 (L) 8.5 - 10.1 MG/DL    Bilirubin, total 0.4 0.2 - 1.0 MG/DL    ALT (SGPT) 36 12 - 78 U/L    AST (SGOT) 37 15 - 37 U/L    Alk. phosphatase 80 45 - 117 U/L    Protein, total 7.3 6.4 - 8.2 g/dL    Albumin 2.9 (L) 3.5 - 5.0 g/dL    Globulin 4.4 (H) 2.0 - 4.0 g/dL    A-G Ratio 0.7 (L) 1.1 - 2.2     GLUCOSE, POC    Collection Time: 11/08/22  9:24 AM   Result Value Ref Range    Glucose (POC) 61 (L) 65 - 117 mg/dL    Performed by Dalton Fonseca        Total time spent with patient:  []        15   []        25   []        35   []         __ minutes    []        Critical Care Provided    Care Plan discussed with:    [x]        Patient   []        Family    []        Care Manager   []        Consultant/Specialist :      []          >50% of visit spent in counseling and coordination of care   (Discussed []        CODE status,  []        Care Plan, []        D/C Planning)    ___________________________________________________    Attending Physician: Eugenia Page MD

## 2022-11-08 NOTE — PROGRESS NOTES
Problem: Self Care Deficits Care Plan (Adult)  Goal: *Therapy Goal (Edit Goal, Insert Text)  Description: FUNCTIONAL STATUS PRIOR TO ADMISSION: Patient was independent and active without use of DME. She lives alone in one level home with 4-5 EWA . She has a history of prior CVA with cane, RW, shower chair available if needed. Occupational Therapy  Initiated 11/8/22  1. Patient will perform grooming in stand with modified independence within 7 day(s). 2.  Patient will perform bathing with modified independence within 7 day(s). 3.  Patient will perform lower body dressing with modified independence within 7 day(s). 4.  Patient will perform toilet transfers with modified independence within 7 day(s). 5.  Patient will perform all aspects of toileting with modified independence within 7 day(s). Outcome: Not Met   OCCUPATIONAL THERAPY EVALUATION  Patient: Hunter Stauffer (39 y.o. female)  Date: 11/8/2022  Primary Diagnosis: RAYMOND (acute kidney injury) ([facility] [address] [N17.9]  Diarrhea [R19.7]       Precautions:        ASSESSMENT  Based on the objective data described below, the patient presents with decreased ADL performance from baseline due to general weakness, impaired standing balance, and decreased standing tolerance. Patient has been hospitalized since 10/2/22 and is gradually getting deconditioned. RW provided so that patient is able to move safely in room and to bathroom. Initiated instruction in seated exercises. Patient will benefit from OT services to facilitate return to PLOF (independent ADL) and reduce risk of further decline. Current Level of Function Impacting Discharge (ADLs/self-care): CGA in stand with no assistive device. Supervision with RW    Functional Outcome Measure: The patient scored Total: 70/100 on the Barthel Index outcome measure      Patient will benefit from skilled therapy intervention to address the above noted impairments.        PLAN :  Recommendations and Planned Interventions: self care training, functional mobility training, therapeutic exercise, balance training, therapeutic activities, endurance activities, patient education, home safety training, and family training/education    Frequency/Duration: Patient will be followed by occupational therapy 4 times a week to address goals.     Recommendation for discharge: (in order for the patient to meet his/her long term goals)  To be determined: likely no need for OT follow up at discharge    This discharge recommendation:  Has been made in collaboration with the attending provider and/or case management    IF patient discharges home will need the following DME: patient owns DME required for discharge       SUBJECTIVE:   Patient pleasant and cooperative     OBJECTIVE DATA SUMMARY:   HISTORY:   Past Medical History:   Diagnosis Date    Diabetes ([facility] [address]     Diabetes Mellitus 04/25/2011    Diabetes Mellitus 04/25/2011    GERG - Esophagitis- reflux 04/25/2011    Hypercholesteremia 04/25/2011    Hypercholesterolemia 04/25/2011    Hypertension 04/25/2011    Intertrigo 04/25/2011    Kidney disease     Leiomyoma of uterus, unspecified 04/25/2011    Unspecified visual loss 04/25/2011    rt eye     Past Surgical History:   Procedure Laterality Date    COLONOSCOPY N/A 8/23/2022    COLONOSCOPY performed by Danyelle Regan MD at [facility] ENDOSCOPY       Expanded or extensive additional review of patient history:     Home Situation  Home Environment: Private residence  # Steps to Enter: 5  Rails to Enter: Yes  Hand Rails : Bilateral  One/Two Story Residence: One story  Living Alone: Yes  Support Systems: Child(jhonny)  Patient Expects to be Discharged to[de-identified] Home  Current DME Used/Available at Home:  (has RW. cane, shower chair)  Tub or Shower Type: Tub/Shower combination    Hand dominance: Right    EXAMINATION OF PERFORMANCE DEFICITS:  Cognitive/Behavioral Status:  Neurologic State: Alert  Orientation Level: Oriented X4  Cognition: Follows commands        Hearing: Auditory  Auditory Impairment: None    Vision/Perceptual:                                     Range of Motion:    AROM: Within functional limits                         Strength:    Strength: Generally decreased, functional                Coordination:  Coordination: Within functional limits  Fine Motor Skills-Upper: Left Intact; Right Intact    Gross Motor Skills-Upper: Left Intact; Right Intact    Tone & Sensation:    Tone: Normal                         Balance:  Sitting: Intact; Without support  Standing: Impaired; Without support  Standing - Static: Good; Unsupported  Standing - Dynamic : Fair;Constant support    Functional Mobility and Transfers for ADLs:  Bed Mobility:       Transfers:  Sit to Stand: Supervision  Stand to Sit: Supervision  Bed to Chair: Supervision  Bathroom Mobility: Contact guard assistance    ADL Assessment:  Feeding: Independent    Oral Facial Hygiene/Grooming: Supervision (standing)    Bathing: Contact guard assistance         Upper Body Dressing: Setup    Lower Body Dressing: Setup    Toileting: Supervision                ADL Intervention and task modifications: Instructed in bathroom mobility with and without RW. Unsteady and hand held support required- provided with RW with improved performance noted. Arranged room for easy passage with RW. Discussed isidoro for patient to walk to bathroom with nurse who will set up remote tele box. Patient stood in bathroom for tooth brushing for several minutes- required forearm support on counter for support du to fatigue. Therapeutic Exercise: Instructed in simple UE/LE AROM exercises- seated. Educated on establishing routine of exercises perhaps after breakfast daily.     Functional Measure:    Barthel Index:  Bathin  Bladder: 10  Bowels: 10  Groomin  Dressing: 10  Feeding: 10  Mobility: 5  Stairs: 5  Toilet Use: 5  Transfer (Bed to Chair and Back): 10  Total: 70/100      The Barthel ADL Index: Guidelines  1. The index should be used as a record of what a patient does, not as a record of what a patient could do. 2. The main aim is to establish degree of independence from any help, physical or verbal, however minor and for whatever reason. 3. The need for supervision renders the patient not independent. 4. A patient's performance should be established using the best available evidence. Asking the patient, friends/relatives and nurses are the usual sources, but direct observation and common sense are also important. However direct testing is not needed. 5. Usually the patient's performance over the preceding 24-48 hours is important, but occasionally longer periods will be relevant. 6. Middle categories imply that the patient supplies over 50 per cent of the effort. 7. Use of aids to be independent is allowed. Score Interpretation (from [address] [address])    Independent   60-79 Minimally independent   40-59 Partially dependent   20-39 Very dependent   <20 Totally dependent     -Jamie Rendon., Barthel, DManuelW. (1965). Functional evaluation: the Barthel Index. [facility] ([address] (1997). The Barthel activities of daily living index: self-reporting versus actual performance in the old (> or = 75 years). Journal of [address] [place] [address] 457), [facility], .PAULA, Julia Soto., Haroon Dotson. (1999). Measuring the change in disability after inpatient rehabilitation; comparison of the responsiveness of the Barthel Index and Functional [facility] Measure. Journal of Neurology, Neurosurgery, and Psychiatry, 66(4), 582-705. Michelle Dhaliwal, N.J.A, TAMMY Alba, & Donnie Martinez MManuelA. (2004) Assessment of post-stroke quality of life in cost-effectiveness studies: The usefulness of the Barthel Index and the EuroQoL-5D.  Quality of Life Research, 15, 454-74     Occupational Therapy Evaluation Charge Determination   History Examination Decision-Making   LOW Complexity : Brief history review  LOW Complexity : 1-3 performance deficits relating to physical, cognitive , or psychosocial skils that result in activity limitations and / or participation restrictions  LOW Complexity : No comorbidities that affect functional and no verbal or physical assistance needed to complete eval tasks       Based on the above components, the patient evaluation is determined to be of the following complexity level: LOW   Pain Rating:  None reported     Activity Tolerance:   requires rest breaks    After treatment patient left in no apparent distress:    Sitting in chair and Call bell within reach    COMMUNICATION/EDUCATION:   The patients plan of care was discussed with: Registered nurse. Home safety education was provided and the patient/caregiver indicated understanding. and Patient/family have participated as able in goal setting and plan of care. This patients plan of care is appropriate for delegation to [facility].     Thank you for this referral.  Chasity Shah OT  Time Calculation: 39 mins

## 2022-11-08 NOTE — PROGRESS NOTES
[id number]  Hospitalist Group                                                                                          Hospitalist Progress Note  Jorge Mathew MD  Answering service: [phone] from in house phone        Date of Service:  2022  NAME:  Rolf Gunter  :  1960  MRN:  [id number]      Admission Summary:     Rolf Gunter is a 65 y.o. female with Stage IV/V CKD, T2DM (on insulin), GERD, HTN (uncontrolled), and HLD who presented to the ED complaining of severe diarrhea with work-up revealing RAYMOND on CKD, metabolic acidosis, and C Diff + (now on PO Vanc x 10 days). Admission c/b recurrent episodes of morning hypoglycemia and now respiratory distress with rising O2 requirement with CT Scan revealing multifocal PNA and diagnosed COVID-19+ (On Dexamethasone, Pulmonology consulted, broadened to Cefepime+Vancomycin)     Interval history / Subjective:     She said she feels the same, has frequent diarrhea, no abdominal pain, chest pain or shortness.       Assessment & Plan:     Acute hypoxic respiratory failure- resolved to room air   Multifocal PNA, + COVID-19 on 22, viral with possible superimposed bacterial Pseudomonas Pneumonia  Cefepime x 7 days, end   Continue dexamethasone x 10 days, last dose 11/10  s/p tocilizumab  Ambulate as able, incentive spirometer  SpO2 % on RA    RAYMOND superimposed on CKD stage IV/V, worsening  Metabolic Acidosis  Presented with creatinine 5.33 and BUN 42  Creatinine upward trend and stable, Cr 6.19  Avoid nephrotoxin   Continuing sodium bicarbonate infusion  Monitor renal function   Nephrology on board    Acute diarrhea secondary to C Difficile Infection, POA   Has frequent bowel movement   Oral vancomycin- last dose 2022  -CT abdomen pelvis on  no acute finding   -afebrile and no leukocytosis  -consult to GI     Hypocalcemia  -replace with calcium carbonate  -improving  -repeat ca level in am    Hypomagnesemia:   -replete    Anxiety  Lorazepam as needed BID  Declined to see a psychiatrist     Hypertensive emergency, POA, improving   previously on Cardene infusion, since weaned off  BP improving 147-168/50-78  Continuing carvedilol and nifedipine  She also has as needed hydralazine IV with parameters    Asymptomatic bacteriuria, POA  Urinalysis with bacteria however otherwise unremarkable  Regardless, she is already on antibiotics as above    Chronic normocytic anemia  Hemoglobin stable at baseline    T2DM uncontrolled  A1c 9.2  Sugars better controlled  Hyperglycemia likely related to steroids  Continue long acting to 18 units qhs, lispro 4 unitis, sliding scale and monitor finger stick glucose        Hx of CVA  no residuals  Continuing statin    Hyperlipidemia  Stable  Continuing statin     Obesity  BMI of 31  Counseled on healthy dietary choices     Code status:DNR    Prophylaxis: UFH  Care Plan discussed with: Pt, RN  Anticipated Disposition: >48 hours pending renal function      Hospital Problems  Date Reviewed: 8/23/2022            Codes Class Noted POA    Diarrhea ICD-10-CM: R19.7  ICD-9-CM: 787.91  10/25/2022 Unknown        RAYMOND (acute kidney injury) [facility]) ICD-10-CM: N17.9  ICD-9-CM: 584.9  10/25/2022 Unknown       Review of Systems:   A comprehensive review of systems was negative except for that written in the HPI. Vital Signs:    Last 24hrs VS reviewed since prior progress note.  Most recent are:  Visit Vitals  BP (!) 159/66 (BP 1 Location: Right upper arm)   Pulse 64   Temp 97.8 °F (36.6 °C)   Resp 18   Ht 5' 4\" (1.626 m)   Wt 85 kg (187 lb 6.3 oz)   SpO2 100%   BMI 32.17 kg/m²       No intake or output data in the 24 hours ending 11/08/22 1120       Physical Examination:     I had a face to face encounter with this patient and independently examined them on 11/8/2022 as outlined below:    Constitutional:  comfortable   ENT:  Oral mucosa moist.    Resp:  Diminished bilaterally with faint crackles, appears unable to take deep breath, No pursed lip breathing. CV:  Regular rhythm, normal rate    GI:  Soft, non distended, non tender. Musculoskeletal:  No edema, warm. Trace edema bilaterally     Neurologic:  Moves all extremities spontaneously and ambulating independently. AAOx3                                  Data Review:    Review and/or order of clinical lab test  Review and/or order of tests in the radiology section of [facility]  Review and/or order of tests in the medicine section of [facility]      Labs:     Recent Labs     11/08/22 0521 11/07/22 0508   WBC 10.8 9.5   HGB 8.7* 8.0*   HCT 27.8* 25.5*    268       Recent Labs     11/08/22 0521 11/07/22 0508 11/06/22  0055    140 138   K 3.5 3.4* 3.8    102 106   CO2 30 28 24   BUN 69* 70* 76*   CREA 6.19* 6.72* 6.93*   GLU 75 131* 124*   CA 7.2* 6.7* 6.4*   MG 1.8 2.0 1.4*   PHOS 3.1 3.6 3.8       Recent Labs     11/08/22 0521 11/07/22 0508 11/06/22 0055   ALT 36  --  14   AP 80  --  75   TBILI 0.4  --  0.4   TP 7.3  --  5.9*   ALB 2.9* 2.6* 2.5*   GLOB 4.4*  --  3.4       No results for input(s): INR, PTP, APTT, INREXT, INREXT in the last 72 hours. No results for input(s): FE, TIBC, PSAT, FERR in the last 72 hours. No results found for: FOL, RBCF   No results for input(s): PH, PCO2, PO2 in the last 72 hours. No results for input(s): CPK, CKNDX, TROIQ in the last 72 hours.     No lab exists for component: CPKMB  No results found for: CHOL, CHOLX, CHLST, CHOLV, HDL, HDLP, LDL, LDLC, DLDLP, TGLX, TRIGL, TRIGP, CHHD, CHHDX  Lab Results   Component Value Date/Time    Glucose (POC) 137 (H) 11/08/2022 10:58 AM    Glucose (POC) 61 (L) 11/08/2022 09:24 AM    Glucose (POC) 149 (H) 11/07/2022 09:14 PM    Glucose (POC) 151 (H) 11/07/2022 04:13 PM    Glucose (POC) 171 (H) 11/07/2022 12:37 PM     Lab Results   Component Value Date/Time    Color YELLOW/STRAW 10/26/2022 05:30 AM    Appearance CLEAR 10/26/2022 05:30 AM    Specific gravity 1.011 10/26/2022 05:30 AM    Specific gravity 1.025 04/19/2013 11:30 AM    pH (UA) 6.0 10/26/2022 05:30 AM    Protein 300 (A) 10/26/2022 05:30 AM    Glucose Negative 10/26/2022 05:30 AM    Ketone Negative 10/26/2022 05:30 AM    Bilirubin Negative 10/26/2022 05:30 AM    Urobilinogen 0.2 10/26/2022 05:30 AM    Nitrites Negative 10/26/2022 05:30 AM    Leukocyte Esterase Negative 10/26/2022 05:30 AM    Epithelial cells FEW 10/26/2022 05:30 AM    Bacteria 1+ (A) 10/26/2022 05:30 AM    WBC 0-4 10/26/2022 05:30 AM    RBC 0-5 10/26/2022 05:30 AM     Imaging:   -- Radiology: CT C/A/P on 11/1:  IMPRESSION     1. Bilateral, diffuse airspace infiltrates consistent with multifocal pneumonia. There are small bilateral pleural effusions. 2. No acute findings in the abdomen or pelvis. 3. Gallstones.     Medications Reviewed:     Current Facility-Administered Medications   Medication Dose Route Frequency    hydrALAZINE (APRESOLINE) tablet 50 mg  50 mg Oral TID    calcium carbonate (TUMS) chewable tablet 200 mg [elemental]  200 mg Oral TID WITH MEALS    insulin glargine (LANTUS) injection 18 Units  18 Units SubCUTAneous QHS    insulin lispro (HUMALOG) injection 4 Units  4 Units SubCUTAneous TIDAC    insulin lispro (HUMALOG) injection   SubCUTAneous AC&HS    pantoprazole (PROTONIX) tablet 40 mg  40 mg Oral ACB    dexAMETHasone (DECADRON) tablet 6 mg  6 mg Oral DAILY    benzonatate (TESSALON) capsule 100 mg  100 mg Oral TID PRN    guaiFENesin (ROBITUSSIN) 100 mg/5 mL oral liquid 100 mg  100 mg Oral Q4H PRN    pseudoephedrine (SUDAFED) tablet 30 mg  30 mg Oral Q6H PRN    LORazepam (ATIVAN) tablet 0.5 mg  0.5 mg Oral BID PRN    albuterol-ipratropium (DUO-NEB) 2.5 MG-0.5 MG/3 ML  3 mL Nebulization Q6H PRN    acetaminophen (TYLENOL) tablet 1,000 mg  1,000 mg Oral Q6H PRN    [Held by provider] bumetanide (BUMEX) tablet 2 mg  2 mg Oral DAILY    heparin (porcine) injection 5,000 Units  5,000 Units SubCUTAneous Q8H prochlorperazine (COMPAZINE) tablet 5 mg  5 mg Oral Q6H PRN    Or    prochlorperazine (COMPAZINE) injection 5 mg  5 mg IntraVENous Q6H PRN    NIFEdipine ER (PROCARDIA XL) tablet 60 mg  60 mg Oral BID    melatonin tablet 3 mg  3 mg Oral QHS PRN    zolpidem (AMBIEN) tablet 5 mg  5 mg Oral QHS PRN    ondansetron (ZOFRAN) injection 4 mg  4 mg IntraVENous Q6H PRN    hydrALAZINE (APRESOLINE) 20 mg/mL injection 10 mg  10 mg IntraVENous Q6H PRN    glucose chewable tablet 16 g  4 Tablet Oral PRN    glucagon (GLUCAGEN) injection 1 mg  1 mg IntraMUSCular PRN    dextrose 10 % infusion 0-250 mL  0-250 mL IntraVENous PRN    atorvastatin (LIPITOR) tablet 40 mg  40 mg Oral DAILY    carvediloL (COREG) tablet 25 mg  25 mg Oral BID WITH MEALS     ______________________________________________________________________  EXPECTED LENGTH OF STAY: 4d 7h  ACTUAL LENGTH OF STAY:          14                 Colonel Marley MD

## 2022-11-08 NOTE — CONSULTS
[address]  [address]  E [place] Landeros, [address]  [phone]                     GI CONSULTATION NOTE      NAME:  Marce Kim   :   1960   MRN:   [id number]     Consult Date: 2022     Chief Complaint: diarrhea    History of Present Illness:  Patient is a 65 y.o. female with PMH of DM, GERD, CKD who is seen in consultation at the request of Dr. Gallo Patel for the above mentioned problem. Ms. Efrem Martinez presented to [facility] with diarrhea, nausea and poor oral intake. She was found to be C-diff and COVID +. She received 10 days of oral vancomycin for C-diff, last dose on . She continues with multiple stools. She states she is having up to 8 stools daily. CT abdomen  with no acute findings. She is afebrile and no leukocytosis. Denies abdominal pain, N/V. I tried to obtain further details but she was repeatedly telling me she needs to rest. She is very anxious, rocking back an forth in her chair and ringing her hands while we talk. Per RN she is having very small, drops of stool throughout the day. Colonosocpy 2022  Findings:  JANINE: unremarkable  Entire colon filled with semiliquid and semisolid stool. Two small sessile  polyps 2-4mm removed with cold snare polypectomy in the proximal transverse  colon. Very limited views. PMH:  Past Medical History:   Diagnosis Date    Diabetes ([facility] [address]     Diabetes Mellitus 2011    Diabetes Mellitus 2011    GERG - Esophagitis- reflux 2011    Hypercholesteremia 2011    Hypercholesterolemia 2011    Hypertension 2011    Intertrigo 2011    Kidney disease     Leiomyoma of uterus, unspecified 2011    Unspecified visual loss 2011    rt eye       PSH:  Past Surgical History:   Procedure Laterality Date    COLONOSCOPY N/A 2022    COLONOSCOPY performed by Summer Kay MD at [facility] ENDOSCOPY       Allergies:   Allergies   Allergen Reactions    Sulfur Hives Amoxicillin Nausea Only    Other Medication Unknown (comments)     Zylocaine         Home Medications:  Prior to Admission Medications   Prescriptions Last Dose Informant Patient Reported? Taking? Blood-Glucose Meter monitoring kit   No No   Sig: CONTOUR METER; Use as directed   NIFEdipine ER (ADALAT CC) 30 mg ER tablet 10/25/2022  Yes Yes   Sig: Take 30 mg by mouth two (2) times a day. NOVOFINE 32 32 gauge x 1/4\" ndle   No No   Sig: USE AS DIRECTED   aspirin 81 mg chewable tablet 10/25/2022  Yes Yes   Sig: Take 81 mg by mouth in the morning. atorvastatin (LIPITOR) 40 mg tablet 10/25/2022  No Yes   Sig: TAKE 1 TABLET BY MOUTH EVERY DAY   Patient taking differently: 80 mg.   bumetanide (BUMEX) 1 mg tablet   Yes No   Sig: Take 1 mg by mouth in the morning. carvediloL (COREG) 12.5 mg tablet 10/25/2022  Yes Yes   Sig: Take 25 mg by mouth two (2) times daily (with meals). HOLD FOR SYSTOLIC BLOOD PRESSURE < 120 mmHg OR HEART RATE < 60 bpm   glucose blood VI test strips (ASCENSIA CONTOUR) strip   No No   Sig: Daily   glucose blood VI test strips (BLOOD GLUCOSE TEST) strip   No No   Sig: As directed     insulin glargine (LANTUS,BASAGLAR) 100 unit/mL (3 mL) inpn 10/25/2022  Yes Yes   Si Units by SubCUTAneous route nightly.   sodium bicarbonate 650 mg tablet 10/25/2022  Yes Yes   Sig: Take 650 mg by mouth two (2) times a day.       Facility-Administered Medications: None       Hospital Medications:  Current Facility-Administered Medications   Medication Dose Route Frequency    hydrALAZINE (APRESOLINE) tablet 50 mg  50 mg Oral TID    calcium carbonate (TUMS) chewable tablet 200 mg [elemental]  200 mg Oral TID WITH MEALS    insulin glargine (LANTUS) injection 18 Units  18 Units SubCUTAneous QHS    insulin lispro (HUMALOG) injection 4 Units  4 Units SubCUTAneous TIDAC    insulin lispro (HUMALOG) injection   SubCUTAneous AC&HS    pantoprazole (PROTONIX) tablet 40 mg  40 mg Oral ACB    dexAMETHasone (DECADRON) tablet 6 mg  6 mg Oral DAILY    benzonatate (TESSALON) capsule 100 mg  100 mg Oral TID PRN    guaiFENesin (ROBITUSSIN) 100 mg/5 mL oral liquid 100 mg  100 mg Oral Q4H PRN    pseudoephedrine (SUDAFED) tablet 30 mg  30 mg Oral Q6H PRN    LORazepam (ATIVAN) tablet 0.5 mg  0.5 mg Oral BID PRN    albuterol-ipratropium (DUO-NEB) 2.5 MG-0.5 MG/3 ML  3 mL Nebulization Q6H PRN    acetaminophen (TYLENOL) tablet 1,000 mg  1,000 mg Oral Q6H PRN    [Held by provider] bumetanide (BUMEX) tablet 2 mg  2 mg Oral DAILY    heparin (porcine) injection 5,000 Units  5,000 Units SubCUTAneous Q8H    prochlorperazine (COMPAZINE) tablet 5 mg  5 mg Oral Q6H PRN    Or    prochlorperazine (COMPAZINE) injection 5 mg  5 mg IntraVENous Q6H PRN    NIFEdipine ER (PROCARDIA XL) tablet 60 mg  60 mg Oral BID    melatonin tablet 3 mg  3 mg Oral QHS PRN    zolpidem (AMBIEN) tablet 5 mg  5 mg Oral QHS PRN    ondansetron (ZOFRAN) injection 4 mg  4 mg IntraVENous Q6H PRN    hydrALAZINE (APRESOLINE) 20 mg/mL injection 10 mg  10 mg IntraVENous Q6H PRN    glucose chewable tablet 16 g  4 Tablet Oral PRN    glucagon (GLUCAGEN) injection 1 mg  1 mg IntraMUSCular PRN    dextrose 10 % infusion 0-250 mL  0-250 mL IntraVENous PRN    atorvastatin (LIPITOR) tablet 40 mg  40 mg Oral DAILY    carvediloL (COREG) tablet 25 mg  25 mg Oral BID WITH MEALS       Social History:  Social History     Tobacco Use    Smoking status: Former     Types: Cigarettes     Quit date:      Years since quittin.8    Smokeless tobacco: Never   Substance Use Topics    Alcohol use: No       Family History:  Family History   Problem Relation Age of Onset    Diabetes Mother     Hypertension Mother     Diabetes Father        Review of Systems:    Constitutional: negative fever, negative chills, negative weight loss  Eyes:   negative visual changes  ENT:   negative sore throat, tongue or lip swelling  Respiratory:  negative cough, negative dyspnea  Cards:  negative for chest pain, palpitations, lower extremity edema  GI:   See HPI  :  negative for frequency, dysuria  Integument:  negative for rash and pruritus  Heme:  negative for easy bruising and gum/nose bleeding  Musculoskel: negative for myalgias,  back pain and muscle weakness  Neuro:              negative for headaches, dizziness, vertigo  Psych:  + anxiety       Objective:   Patient Vitals for the past 8 hrs:   BP Temp Pulse Resp SpO2   11/08/22 1659 (!) 175/68 97.5 °F (36.4 °C) 75 20 100 %   11/08/22 1400 -- -- 60 -- --   11/08/22 1230 -- -- -- -- 99 %   11/08/22 1200 -- -- 63 -- --   11/08/22 1059 (!) 159/66 97.8 °F (36.6 °C) 64 18 100 %   11/08/22 1019 -- -- 74 -- 97 %   11/08/22 1014 (!) 162/63 -- 75 -- 100 %   11/08/22 1000 -- -- 74 -- --     No intake/output data recorded. 11/06 1901 - 11/08 0700  In: 748.5 [P.O.:100; I.V.:648.5]  Out: -       PHYSICAL EXAM:  General appearance: cooperative, no distress, appears stated age  Skin: Extremities and face reveal no rashes. HEENT: Sclerae anicteric. Cardiovascular: Regular rate and rhythm. No murmurs, gallops, or rubs. Respiratory:  diminished breath sounds with no wheezes, rales, or rhonchi. GI: Abdomen nondistended, soft, and nontender. Normal active bowel sounds. Rectal: Deferred   Musculoskeletal: No pitting edema of the lower legs. Neurological: Patient is alert and oriented. Psychiatric: + anxiety       Data Review     Recent Labs     11/08/22  0521 11/07/22  0508   WBC 10.8 9.5   HGB 8.7* 8.0*   HCT 27.8* 25.5*    268     Recent Labs     11/08/22  0521 11/07/22  0508    140   K 3.5 3.4*    102   CO2 30 28   BUN 69* 70*   CREA 6.19* 6.72*   GLU 75 131*   PHOS 3.1 3.6   CA 7.2* 6.7*     Recent Labs     11/08/22  0521 11/07/22  0508 11/06/22  0055   AP 80  --  75   TP 7.3  --  5.9*   ALB 2.9* 2.6* 2.5*   GLOB 4.4*  --  3.4     No results for input(s): INR, PTP, APTT, INREXT in the last 72 hours.      Imaging studies reviewed    Assessment / Plan :     MsManuel Everardo Solano was referred to our service for ongoing diarrhea after completing oral vancomycin course for C-diff. She was admitted to [facility] on 10/25/22 with diarrhea, + c-diff test and + COVID. She received 10 days of oral vancomycin and still with c/o diarrhea. She has received IV ABX in treatment of COVID PNA that may be contributing to diarrhea. CT AP on 11/1 showed no acute abdominal findings. She is very anxious. - Will add cholestyramine 1/2 packet BID   - Supportive care per hospitalist  - GI following          Patient Active Hospital Problem List:   Active Problems:    Diarrhea (10/25/2022)      RAYMOND (acute kidney injury) ([facility] [address] (10/25/2022)     Sabrina Sawant NP  I have personally reviewed the history and independently examined the patient. I have reviewed the chart and agree with the documentation recorded by the Mid Level Provider, including the assessment, treatment plan, and disposition.       Nain Cheng MD

## 2022-11-08 NOTE — PROGRESS NOTES
[facility] Adult  Hospitalist Group                                                                                          Hospitalist Progress Note  Augusta Barber MD  Answering service: [phone] from in house phone        Date of Service:  2022  NAME:  Michael Temple  :  1960  MRN:  [id number]      Admission Summary:     Michael Temple is a 65 y.o. female with Stage IV/V CKD, T2DM (on insulin), GERD, HTN (uncontrolled), and HLD who presented to the ED complaining of severe diarrhea with work-up revealing RAYMOND on CKD, metabolic acidosis, and C Diff + (now on PO Vanc x 10 days). Admission c/b recurrent episodes of morning hypoglycemia and now respiratory distress with rising O2 requirement with CT Scan revealing multifocal PNA and diagnosed COVID-19+ (On Dexamethasone, Pulmonology consulted, broadened to Cefepime+Vancomycin)     Interval history / Subjective:     She said she has frequent diarrhea, no abdominal pain, chest pain or shortness.  She said she feels anxious but she doesn't want to see a psychiatrist      Assessment & Plan:     Acute hypoxic respiratory failure - resolved to room air   Multifocal PNA, + COVID-19 on 22, viral with possible superimposed bacterial Pseudomonas Pneumonia  Cefepime x 7 days, ended   Continue dexamethasone x 10 days, last dose 11/10  s/p tocilizumab  Ambulate as able, incentive spirometer  SpO2 100% on RA    RAYMOND superimposed on CKD stage IV/V, worsening  Metabolic Acidosis  Presented with creatinine 5.33 and BUN 42  Creatinine upward but stable 6.19  Avoid nephrotoxin   Off sodium bicarbonate infusion  Monitor renal function   Nephrology on board    Hypocalcemia  -replace with calcium   -improving  -repeat Ca level    Hypomagnesemia:   -replete    Anxiety  Lorazepam as needed BID  Declined to see a psychiatrist     Acute diarrhea secondary to C Difficile Infection, POA, resolved  Oral vancomycin- last dose 11/5/2022  Has diarrhea   Consult GI     Hypertensive emergency, POA, improving   previously on Cardene infusion, since weaned off  BP not at goal  Continuing carvedilol and nifedipine, add hydralazine 50 mg tid on 11/8  She also has as needed hydralazine IV with parameters    Asymptomatic bacteriuria, POA  Urinalysis with bacteria however otherwise unremarkable  Regardless, she is already on antibiotics as above    Chronic normocytic anemia  Hemoglobin stable at baseline    T2DM uncontrolled  A1c 9.2  Sugars better controlled  Hyperglycemia likely related to steroids  Continue long acting to 18 units qhs, lispro 4 unitis, sliding scale and monitor finger stick glucose        Hx of CVA  no residuals  Continuing statin    Hyperlipidemia  Stable  Continuing statin     Obesity  BMI of 31  Counseled on healthy dietary choices     Code status:DNR    Prophylaxis: UFH  Care Plan discussed with: Pt, RN  Anticipated Disposition: >48 hours pending renal function      Hospital Problems  Date Reviewed: 8/23/2022            Codes Class Noted POA    Diarrhea ICD-10-CM: R19.7  ICD-9-CM: 787.91  10/25/2022 Unknown        RAYMOND (acute kidney injury) [facility]) ICD-10-CM: N17.9  ICD-9-CM: 584.9  10/25/2022 Unknown       Review of Systems:   A comprehensive review of systems was negative except for that written in the HPI. Vital Signs:    Last 24hrs VS reviewed since prior progress note.  Most recent are:  Visit Vitals  BP (!) 175/54 (BP 1 Location: Right upper arm)   Pulse 70   Temp 98.2 °F (36.8 °C)   Resp 18   Ht 5' 4\" (1.626 m)   Wt 85 kg (187 lb 6.3 oz)   SpO2 100%   BMI 32.17 kg/m²       No intake or output data in the 24 hours ending 11/08/22 1008       Physical Examination:     I had a face to face encounter with this patient and independently examined them on 11/8/2022 as outlined below:    Constitutional:  comfortable   ENT:  Oral mucosa moist.    Resp:  Diminished bilaterally with faint crackles, appears unable to take deep breath, No pursed lip breathing. CV:  Regular rhythm, normal rate    GI:  Soft, non distended, non tender. Musculoskeletal:  No edema, warm. Trace edema bilaterally     Neurologic:  Moves all extremities spontaneously and ambulating independently. AAOx3                                  Data Review:    Review and/or order of clinical lab test  Review and/or order of tests in the radiology section of [facility]  Review and/or order of tests in the medicine section of [facility]      Labs:     Recent Labs     11/08/22 0521 11/07/22  0508   WBC 10.8 9.5   HGB 8.7* 8.0*   HCT 27.8* 25.5*    268       Recent Labs     11/08/22 0521 11/07/22 0508 11/06/22  0055    140 138   K 3.5 3.4* 3.8    102 106   CO2 30 28 24   BUN 69* 70* 76*   CREA 6.19* 6.72* 6.93*   GLU 75 131* 124*   CA 7.2* 6.7* 6.4*   MG 1.8 2.0 1.4*   PHOS 3.1 3.6 3.8       Recent Labs     11/08/22 0521 11/07/22 0508 11/06/22  0055   ALT 36  --  14   AP 80  --  75   TBILI 0.4  --  0.4   TP 7.3  --  5.9*   ALB 2.9* 2.6* 2.5*   GLOB 4.4*  --  3.4       No results for input(s): INR, PTP, APTT, INREXT, INREXT in the last 72 hours. No results for input(s): FE, TIBC, PSAT, FERR in the last 72 hours. No results found for: FOL, RBCF   No results for input(s): PH, PCO2, PO2 in the last 72 hours. No results for input(s): CPK, CKNDX, TROIQ in the last 72 hours.     No lab exists for component: CPKMB  No results found for: CHOL, CHOLX, CHLST, CHOLV, HDL, HDLP, LDL, LDLC, DLDLP, TGLX, TRIGL, TRIGP, CHHD, CHHDX  Lab Results   Component Value Date/Time    Glucose (POC) 61 (L) 11/08/2022 09:24 AM    Glucose (POC) 149 (H) 11/07/2022 09:14 PM    Glucose (POC) 151 (H) 11/07/2022 04:13 PM    Glucose (POC) 171 (H) 11/07/2022 12:37 PM    Glucose (POC) 113 11/07/2022 07:31 AM     Lab Results   Component Value Date/Time    Color YELLOW/STRAW 10/26/2022 05:30 AM    Appearance CLEAR 10/26/2022 05:30 AM    Specific gravity 1.011 10/26/2022 05:30 AM    Specific gravity 1.025 04/19/2013 11:30 AM    pH (UA) 6.0 10/26/2022 05:30 AM    Protein 300 (A) 10/26/2022 05:30 AM    Glucose Negative 10/26/2022 05:30 AM    Ketone Negative 10/26/2022 05:30 AM    Bilirubin Negative 10/26/2022 05:30 AM    Urobilinogen 0.2 10/26/2022 05:30 AM    Nitrites Negative 10/26/2022 05:30 AM    Leukocyte Esterase Negative 10/26/2022 05:30 AM    Epithelial cells FEW 10/26/2022 05:30 AM    Bacteria 1+ (A) 10/26/2022 05:30 AM    WBC 0-4 10/26/2022 05:30 AM    RBC 0-5 10/26/2022 05:30 AM     Imaging:   -- Radiology: CT C/A/P on 11/1:  IMPRESSION     1. Bilateral, diffuse airspace infiltrates consistent with multifocal pneumonia. There are small bilateral pleural effusions. 2. No acute findings in the abdomen or pelvis. 3. Gallstones.     Medications Reviewed:     Current Facility-Administered Medications   Medication Dose Route Frequency    hydrALAZINE (APRESOLINE) tablet 50 mg  50 mg Oral TID    calcium carbonate (TUMS) chewable tablet 200 mg [elemental]  200 mg Oral TID WITH MEALS    insulin glargine (LANTUS) injection 18 Units  18 Units SubCUTAneous QHS    insulin lispro (HUMALOG) injection 4 Units  4 Units SubCUTAneous TIDAC    insulin lispro (HUMALOG) injection   SubCUTAneous AC&HS    pantoprazole (PROTONIX) tablet 40 mg  40 mg Oral ACB    dexAMETHasone (DECADRON) tablet 6 mg  6 mg Oral DAILY    benzonatate (TESSALON) capsule 100 mg  100 mg Oral TID PRN    guaiFENesin (ROBITUSSIN) 100 mg/5 mL oral liquid 100 mg  100 mg Oral Q4H PRN    pseudoephedrine (SUDAFED) tablet 30 mg  30 mg Oral Q6H PRN    LORazepam (ATIVAN) tablet 0.5 mg  0.5 mg Oral BID PRN    albuterol-ipratropium (DUO-NEB) 2.5 MG-0.5 MG/3 ML  3 mL Nebulization Q6H PRN    acetaminophen (TYLENOL) tablet 1,000 mg  1,000 mg Oral Q6H PRN    [Held by provider] bumetanide (BUMEX) tablet 2 mg  2 mg Oral DAILY    heparin (porcine) injection 5,000 Units  5,000 Units SubCUTAneous Q8H    prochlorperazine (COMPAZINE) tablet 5 mg  5 mg Oral Q6H PRN    Or    prochlorperazine (COMPAZINE) injection 5 mg  5 mg IntraVENous Q6H PRN    NIFEdipine ER (PROCARDIA XL) tablet 60 mg  60 mg Oral BID    melatonin tablet 3 mg  3 mg Oral QHS PRN    zolpidem (AMBIEN) tablet 5 mg  5 mg Oral QHS PRN    ondansetron (ZOFRAN) injection 4 mg  4 mg IntraVENous Q6H PRN    hydrALAZINE (APRESOLINE) 20 mg/mL injection 10 mg  10 mg IntraVENous Q6H PRN    glucose chewable tablet 16 g  4 Tablet Oral PRN    glucagon (GLUCAGEN) injection 1 mg  1 mg IntraMUSCular PRN    dextrose 10 % infusion 0-250 mL  0-250 mL IntraVENous PRN    atorvastatin (LIPITOR) tablet 40 mg  40 mg Oral DAILY    carvediloL (COREG) tablet 25 mg  25 mg Oral BID WITH MEALS     ______________________________________________________________________  EXPECTED LENGTH OF STAY: 4d 7h  ACTUAL LENGTH OF STAY:          14                 Barbara Wong MD

## 2022-11-09 VITALS
DIASTOLIC BLOOD PRESSURE: 57 MMHG | BODY MASS INDEX: 34.02 KG/M2 | TEMPERATURE: 98.3 F | HEART RATE: 64 BPM | OXYGEN SATURATION: 98 % | RESPIRATION RATE: 18 BRPM | HEIGHT: 64 IN | SYSTOLIC BLOOD PRESSURE: 142 MMHG | WEIGHT: 199.3 LBS

## 2022-11-09 LAB
ALBUMIN SERPL-MCNC: 2.5 G/DL (ref 3.5–5)
ALBUMIN/GLOB SERPL: 0.7 {RATIO} (ref 1.1–2.2)
ALP SERPL-CCNC: 69 U/L (ref 45–117)
ALT SERPL-CCNC: 35 U/L (ref 12–78)
ANION GAP SERPL CALC-SCNC: 4 MMOL/L (ref 5–15)
AST SERPL-CCNC: 25 U/L (ref 15–37)
BASOPHILS # BLD: 0 K/UL (ref 0–0.1)
BASOPHILS NFR BLD: 0 % (ref 0–1)
BILIRUB SERPL-MCNC: 0.3 MG/DL (ref 0.2–1)
BUN SERPL-MCNC: 67 MG/DL (ref 6–20)
BUN/CREAT SERPL: 11 (ref 12–20)
CALCIUM SERPL-MCNC: 6.4 MG/DL (ref 8.5–10.1)
CHLORIDE SERPL-SCNC: 103 MMOL/L (ref 97–108)
CO2 SERPL-SCNC: 31 MMOL/L (ref 21–32)
CREAT SERPL-MCNC: 6.05 MG/DL (ref 0.55–1.02)
DIFFERENTIAL METHOD BLD: ABNORMAL
EOSINOPHIL # BLD: 0 K/UL (ref 0–0.4)
EOSINOPHIL NFR BLD: 0 % (ref 0–7)
ERYTHROCYTE [DISTWIDTH] IN BLOOD BY AUTOMATED COUNT: 15 % (ref 11.5–14.5)
ERYTHROCYTE [DISTWIDTH] IN BLOOD BY AUTOMATED COUNT: 15.1 % (ref 11.5–14.5)
GLOBULIN SER CALC-MCNC: 3.5 G/DL (ref 2–4)
GLUCOSE BLD STRIP.AUTO-MCNC: 69 MG/DL (ref 65–117)
GLUCOSE BLD STRIP.AUTO-MCNC: 84 MG/DL (ref 65–117)
GLUCOSE BLD STRIP.AUTO-MCNC: 99 MG/DL (ref 65–117)
GLUCOSE SERPL-MCNC: 173 MG/DL (ref 65–100)
HCT VFR BLD AUTO: 22.4 % (ref 35–47)
HCT VFR BLD AUTO: 24.5 % (ref 35–47)
HGB BLD-MCNC: 7 G/DL (ref 11.5–16)
HGB BLD-MCNC: 7.7 G/DL (ref 11.5–16)
IMM GRANULOCYTES # BLD AUTO: 0.2 K/UL (ref 0–0.04)
IMM GRANULOCYTES NFR BLD AUTO: 2 % (ref 0–0.5)
LYMPHOCYTES # BLD: 0.6 K/UL (ref 0.8–3.5)
LYMPHOCYTES NFR BLD: 8 % (ref 12–49)
MAGNESIUM SERPL-MCNC: 1.7 MG/DL (ref 1.6–2.4)
MCH RBC QN AUTO: 26.6 PG (ref 26–34)
MCH RBC QN AUTO: 26.9 PG (ref 26–34)
MCHC RBC AUTO-ENTMCNC: 31.3 G/DL (ref 30–36.5)
MCHC RBC AUTO-ENTMCNC: 31.4 G/DL (ref 30–36.5)
MCV RBC AUTO: 85.2 FL (ref 80–99)
MCV RBC AUTO: 85.7 FL (ref 80–99)
MONOCYTES # BLD: 0.5 K/UL (ref 0–1)
MONOCYTES NFR BLD: 6 % (ref 5–13)
NEUTS SEG # BLD: 6.6 K/UL (ref 1.8–8)
NEUTS SEG NFR BLD: 84 % (ref 32–75)
NRBC # BLD: 0 K/UL (ref 0–0.01)
NRBC # BLD: 0 K/UL (ref 0–0.01)
NRBC BLD-RTO: 0 PER 100 WBC
NRBC BLD-RTO: 0 PER 100 WBC
PHOSPHATE SERPL-MCNC: 3.6 MG/DL (ref 2.6–4.7)
PLATELET # BLD AUTO: 208 K/UL (ref 150–400)
PLATELET # BLD AUTO: 259 K/UL (ref 150–400)
PLATELET COMMENTS,PCOM: ABNORMAL
PMV BLD AUTO: 11.6 FL (ref 8.9–12.9)
PMV BLD AUTO: 11.6 FL (ref 8.9–12.9)
POTASSIUM SERPL-SCNC: 3.5 MMOL/L (ref 3.5–5.1)
PROT SERPL-MCNC: 6 G/DL (ref 6.4–8.2)
RBC # BLD AUTO: 2.63 M/UL (ref 3.8–5.2)
RBC # BLD AUTO: 2.86 M/UL (ref 3.8–5.2)
RBC MORPH BLD: ABNORMAL
RBC MORPH BLD: ABNORMAL
SERVICE CMNT-IMP: NORMAL
SODIUM SERPL-SCNC: 138 MMOL/L (ref 136–145)
WBC # BLD AUTO: 7.9 K/UL (ref 3.6–11)
WBC # BLD AUTO: 8.4 K/UL (ref 3.6–11)

## 2022-11-09 PROCEDURE — 84100 ASSAY OF PHOSPHORUS: CPT

## 2022-11-09 PROCEDURE — 74011250637 HC RX REV CODE- 250/637: Performed by: INTERNAL MEDICINE

## 2022-11-09 PROCEDURE — 74011250637 HC RX REV CODE- 250/637: Performed by: PHYSICIAN ASSISTANT

## 2022-11-09 PROCEDURE — 83735 ASSAY OF MAGNESIUM: CPT

## 2022-11-09 PROCEDURE — 74011250637 HC RX REV CODE- 250/637

## 2022-11-09 PROCEDURE — 80053 COMPREHEN METABOLIC PANEL: CPT

## 2022-11-09 PROCEDURE — 74011250637 HC RX REV CODE- 250/637: Performed by: HOSPITALIST

## 2022-11-09 PROCEDURE — 74011250637 HC RX REV CODE- 250/637: Performed by: FAMILY MEDICINE

## 2022-11-09 PROCEDURE — 36415 COLL VENOUS BLD VENIPUNCTURE: CPT

## 2022-11-09 PROCEDURE — 85025 COMPLETE CBC W/AUTO DIFF WBC: CPT

## 2022-11-09 PROCEDURE — 74011636637 HC RX REV CODE- 636/637: Performed by: INTERNAL MEDICINE

## 2022-11-09 PROCEDURE — 85027 COMPLETE CBC AUTOMATED: CPT

## 2022-11-09 PROCEDURE — 74011250636 HC RX REV CODE- 250/636: Performed by: PHYSICIAN ASSISTANT

## 2022-11-09 PROCEDURE — 97116 GAIT TRAINING THERAPY: CPT

## 2022-11-09 PROCEDURE — 74011250637 HC RX REV CODE- 250/637: Performed by: STUDENT IN AN ORGANIZED HEALTH CARE EDUCATION/TRAINING PROGRAM

## 2022-11-09 PROCEDURE — 82962 GLUCOSE BLOOD TEST: CPT

## 2022-11-09 RX ORDER — CALCIUM CARBONATE 200(500)MG
200 TABLET,CHEWABLE ORAL
Qty: 18 TABLET | Refills: 0 | Status: SHIPPED | OUTPATIENT
Start: 2022-11-09 | End: 2022-11-15

## 2022-11-09 RX ORDER — CALCIUM CARBONATE 200(500)MG
200 TABLET,CHEWABLE ORAL
Status: DISCONTINUED | OUTPATIENT
Start: 2022-11-09 | End: 2022-11-09 | Stop reason: HOSPADM

## 2022-11-09 RX ORDER — CHOLESTYRAMINE 4 G/4.8G
2 POWDER, FOR SUSPENSION ORAL 2 TIMES DAILY WITH MEALS
Qty: 15 PACKET | Refills: 0 | Status: SHIPPED | OUTPATIENT
Start: 2022-11-09 | End: 2022-11-24

## 2022-11-09 RX ORDER — PANTOPRAZOLE SODIUM 40 MG/1
40 TABLET, DELAYED RELEASE ORAL
Qty: 30 TABLET | Refills: 0 | Status: SHIPPED | OUTPATIENT
Start: 2022-11-10

## 2022-11-09 RX ORDER — CARVEDILOL 25 MG/1
25 TABLET ORAL 2 TIMES DAILY WITH MEALS
Qty: 60 TABLET | Refills: 0 | Status: SHIPPED | OUTPATIENT
Start: 2022-11-09

## 2022-11-09 RX ORDER — HYDRALAZINE HYDROCHLORIDE 50 MG/1
50 TABLET, FILM COATED ORAL 3 TIMES DAILY
Qty: 90 TABLET | Refills: 0 | Status: SHIPPED | OUTPATIENT
Start: 2022-11-09 | End: 2022-12-09

## 2022-11-09 RX ADMIN — LORAZEPAM 0.5 MG: 0.5 TABLET ORAL at 04:46

## 2022-11-09 RX ADMIN — CALCIUM CARBONATE (ANTACID) CHEW TAB 500 MG 200 MG: 500 CHEW TAB at 08:36

## 2022-11-09 RX ADMIN — ATORVASTATIN CALCIUM 40 MG: 40 TABLET, FILM COATED ORAL at 08:34

## 2022-11-09 RX ADMIN — NIFEDIPINE 60 MG: 60 TABLET, EXTENDED RELEASE ORAL at 08:37

## 2022-11-09 RX ADMIN — CARVEDILOL 25 MG: 12.5 TABLET, FILM COATED ORAL at 08:36

## 2022-11-09 RX ADMIN — CALCIUM CARBONATE (ANTACID) CHEW TAB 500 MG 200 MG: 500 CHEW TAB at 11:25

## 2022-11-09 RX ADMIN — HYDRALAZINE HYDROCHLORIDE 50 MG: 50 TABLET, FILM COATED ORAL at 08:35

## 2022-11-09 RX ADMIN — ACETAMINOPHEN 1000 MG: 500 TABLET ORAL at 05:48

## 2022-11-09 RX ADMIN — Medication 4 UNITS: at 08:29

## 2022-11-09 RX ADMIN — HEPARIN SODIUM 5000 UNITS: 5000 INJECTION INTRAVENOUS; SUBCUTANEOUS at 00:00

## 2022-11-09 RX ADMIN — CHOLESTYRAMINE 2 G: 4 POWDER, FOR SUSPENSION ORAL at 08:38

## 2022-11-09 RX ADMIN — DEXAMETHASONE 6 MG: 4 TABLET ORAL at 08:32

## 2022-11-09 RX ADMIN — PANTOPRAZOLE SODIUM 40 MG: 40 TABLET, DELAYED RELEASE ORAL at 06:57

## 2022-11-09 RX ADMIN — HEPARIN SODIUM 5000 UNITS: 5000 INJECTION INTRAVENOUS; SUBCUTANEOUS at 08:27

## 2022-11-09 NOTE — PROGRESS NOTES
[facility] Adult  Hospitalist Group                                                                                          Hospitalist Progress Note  Gage Chicas MD  Answering service: [phone] from in house phone        Date of Service:  2022  NAME:  Nano Mead  :  1960  MRN:  [id number]      Admission Summary:     Nano Mead is a 65 y.o. female with Stage IV/V CKD, T2DM (on insulin), GERD, HTN (uncontrolled), and HLD who presented to the ED complaining of severe diarrhea with work-up revealing RAYMOND on CKD, metabolic acidosis, and C Diff + (now on PO Vanc x 10 days).  Admission c/b recurrent episodes of morning hypoglycemia and now respiratory distress with rising O2 requirement with CT Scan revealing multifocal PNA and diagnosed COVID-19+ (On Dexamethasone, Pulmonology consulted, broadened to Cefepime+Vancomycin)     Interval history / Subjective:     She said she feels better, bowel movement improving,      Assessment & Plan:     Acute hypoxic respiratory failure- resolved to room air   Multifocal PNA, + COVID-19 on 22, viral with possible superimposed bacterial Pseudomonas Pneumonia  Cefepime x 7 days, end   Continue dexamethasone x 10 days, last dose 11/10  s/p tocilizumab  Ambulate as able, incentive spirometer  SpO2 % on RA    RAYMOND superimposed on CKD stage IV/V, worsening  Metabolic Acidosis  Presented with creatinine 5.33 and BUN 42  Creatinine improving, Creatinin 6.05  Avoid nephrotoxin   Discontinued sodium bicarbonate infusion  Monitor renal function   Nephrology on board    Acute diarrhea secondary to C Difficile Infection, POA   Has frequent bowel movement   Oral vancomycin- last dose 2022  -CT abdomen pelvis on  no acute finding   -afebrile and no leukocytosis  -seen by GI , added questran     Hypocalcemia  -continue with calcium carbonate  -improving  -repeat ca level in am    Hypomagnesemia: -replete    Anxiety  Lorazepam as needed BID  Declined to see a psychiatrist     Hypertensive emergency, POA, improving   previously on Cardene infusion, since weaned off  BP improving   Continuing carvedilol and nifedipine, added hydralazine   She also has as needed hydralazine IV with parameters    Asymptomatic bacteriuria, POA  Urinalysis with bacteria however otherwise unremarkable  Regardless, she is already on antibiotics as above    Chronic normocytic anemia  Repeated Hgb 7.7, stable    T2DM uncontrolled  A1c 9.2  Sugars better controlled  Hyperglycemia likely related to steroids  Continue long acting to 18 units qhs, lispro 4 unitis, sliding scale and monitor finger stick glucose        Hx of CVA  no residuals  Continuing statin    Hyperlipidemia  Stable  Continuing statin     Obesity  BMI of 31  Counseled on healthy dietary choices     Code status:DNR    Prophylaxis: [facility]  Care Plan discussed with: Pt, RN  Anticipated Disposition: Home with home health      Hospital Problems  Date Reviewed: 8/23/2022            Codes Class Noted POA    Diarrhea ICD-10-CM: R19.7  ICD-9-CM: 787.91  10/25/2022 Unknown        RAYMODN (acute kidney injury) [facility]) ICD-10-CM: N17.9  ICD-9-CM: 584.9  10/25/2022 Unknown       Review of Systems:   A comprehensive review of systems was negative except for that written in the HPI. Vital Signs:    Last 24hrs VS reviewed since prior progress note.  Most recent are:  Visit Vitals  BP (!) 142/57 (BP 1 Location: Right upper arm, BP Patient Position: At rest;Sitting)   Pulse 61   Temp 98.3 °F (36.8 °C)   Resp 18   Ht 5' 4\" (1.626 m)   Wt 90.4 kg (199 lb 4.7 oz)   SpO2 98%   BMI 34.21 kg/m²       No intake or output data in the 24 hours ending 11/09/22 1216       Physical Examination:     I had a face to face encounter with this patient and independently examined them on 11/9/2022 as outlined below:    Constitutional:  comfortable   ENT:  Oral mucosa moist.    Resp:  Diminished bilaterally with faint crackles, appears unable to take deep breath, No pursed lip breathing. CV:  Regular rhythm, normal rate    GI:  Soft, non distended, non tender. Musculoskeletal:  No edema, warm. Trace edema bilaterally     Neurologic:  Moves all extremities spontaneously and ambulating independently. AAOx3                                  Data Review:    Review and/or order of clinical lab test  Review and/or order of tests in the radiology section of [facility]  Review and/or order of tests in the medicine section of [facility]      Labs:     Recent Labs     11/09/22  1132 11/09/22 0058   WBC 8.4 7.9   HGB 7.7* 7.0*   HCT 24.5* 22.4*    208       Recent Labs     11/09/22 0058 11/08/22 0521 11/07/22  0508    137 140   K 3.5 3.5 3.4*    102 102   CO2 31 30 28   BUN 67* 69* 70*   CREA 6.05* 6.19* 6.72*   * 75 131*   CA 6.4* 7.2* 6.7*   MG 1.7 1.8 2.0   PHOS 3.6 3.1 3.6       Recent Labs     11/09/22 0058 11/08/22 0521 11/07/22  0508   ALT 35 36  --    AP 69 80  --    TBILI 0.3 0.4  --    TP 6.0* 7.3  --    ALB 2.5* 2.9* 2.6*   GLOB 3.5 4.4*  --        No results for input(s): INR, PTP, APTT, INREXT, INREXT in the last 72 hours. No results for input(s): FE, TIBC, PSAT, FERR in the last 72 hours. No results found for: FOL, RBCF   No results for input(s): PH, PCO2, PO2 in the last 72 hours. No results for input(s): CPK, CKNDX, TROIQ in the last 72 hours.     No lab exists for component: CPKMB  No results found for: CHOL, CHOLX, CHLST, CHOLV, HDL, HDLP, LDL, LDLC, DLDLP, TGLX, TRIGL, TRIGP, CHHD, CHHDX  Lab Results   Component Value Date/Time    Glucose (POC) 84 11/09/2022 11:36 AM    Glucose (POC) 69 11/09/2022 11:27 AM    Glucose (POC) 99 11/09/2022 05:47 AM    Glucose (POC) 266 (H) 11/08/2022 08:57 PM    Glucose (POC) 255 (H) 11/08/2022 05:04 PM     Lab Results   Component Value Date/Time    Color YELLOW/STRAW 10/26/2022 05:30 AM    Appearance CLEAR 10/26/2022 05:30 AM    Specific gravity 1.011 10/26/2022 05:30 AM    Specific gravity 1.025 04/19/2013 11:30 AM    pH (UA) 6.0 10/26/2022 05:30 AM    Protein 300 (A) 10/26/2022 05:30 AM    Glucose Negative 10/26/2022 05:30 AM    Ketone Negative 10/26/2022 05:30 AM    Bilirubin Negative 10/26/2022 05:30 AM    Urobilinogen 0.2 10/26/2022 05:30 AM    Nitrites Negative 10/26/2022 05:30 AM    Leukocyte Esterase Negative 10/26/2022 05:30 AM    Epithelial cells FEW 10/26/2022 05:30 AM    Bacteria 1+ (A) 10/26/2022 05:30 AM    WBC 0-4 10/26/2022 05:30 AM    RBC 0-5 10/26/2022 05:30 AM     Imaging:   -- Radiology: CT C/A/P on 11/1:  IMPRESSION     1. Bilateral, diffuse airspace infiltrates consistent with multifocal pneumonia. There are small bilateral pleural effusions. 2. No acute findings in the abdomen or pelvis. 3. Gallstones.     Medications Reviewed:     Current Facility-Administered Medications   Medication Dose Route Frequency    hydrALAZINE (APRESOLINE) tablet 50 mg  50 mg Oral TID    cholestyramine-aspartame (QUESTRAN LIGHT) packet 2 g  2 g Oral BID WITH MEALS    insulin glargine (LANTUS) injection 18 Units  18 Units SubCUTAneous QHS    insulin lispro (HUMALOG) injection 4 Units  4 Units SubCUTAneous TIDAC    insulin lispro (HUMALOG) injection   SubCUTAneous AC&HS    pantoprazole (PROTONIX) tablet 40 mg  40 mg Oral ACB    dexAMETHasone (DECADRON) tablet 6 mg  6 mg Oral DAILY    benzonatate (TESSALON) capsule 100 mg  100 mg Oral TID PRN    guaiFENesin (ROBITUSSIN) 100 mg/5 mL oral liquid 100 mg  100 mg Oral Q4H PRN    pseudoephedrine (SUDAFED) tablet 30 mg  30 mg Oral Q6H PRN    LORazepam (ATIVAN) tablet 0.5 mg  0.5 mg Oral BID PRN    albuterol-ipratropium (DUO-NEB) 2.5 MG-0.5 MG/3 ML  3 mL Nebulization Q6H PRN    acetaminophen (TYLENOL) tablet 1,000 mg  1,000 mg Oral Q6H PRN    [Held by provider] bumetanide (BUMEX) tablet 2 mg  2 mg Oral DAILY    heparin (porcine) injection 5,000 Units  5,000 Units SubCUTAneous Q8H    prochlorperazine (COMPAZINE) tablet 5 mg  5 mg Oral Q6H PRN    Or    prochlorperazine (COMPAZINE) injection 5 mg  5 mg IntraVENous Q6H PRN    NIFEdipine ER (PROCARDIA XL) tablet 60 mg  60 mg Oral BID    melatonin tablet 3 mg  3 mg Oral QHS PRN    zolpidem (AMBIEN) tablet 5 mg  5 mg Oral QHS PRN    ondansetron (ZOFRAN) injection 4 mg  4 mg IntraVENous Q6H PRN    hydrALAZINE (APRESOLINE) 20 mg/mL injection 10 mg  10 mg IntraVENous Q6H PRN    glucose chewable tablet 16 g  4 Tablet Oral PRN    glucagon (GLUCAGEN) injection 1 mg  1 mg IntraMUSCular PRN    dextrose 10 % infusion 0-250 mL  0-250 mL IntraVENous PRN    atorvastatin (LIPITOR) tablet 40 mg  40 mg Oral DAILY    carvediloL (COREG) tablet 25 mg  25 mg Oral BID WITH MEALS     ______________________________________________________________________  EXPECTED LENGTH OF STAY: 4d 7h  ACTUAL LENGTH OF STAY:          15                 Jessi Lomeli MD

## 2022-11-09 NOTE — PROGRESS NOTES
Problem: Mobility Impaired (Adult and Pediatric)  Goal: *Acute Goals and Plan of Care (Insert Text)  Description: FUNCTIONAL STATUS PRIOR TO ADMISSION: Patient was independent without use of DME. She lives alone in one level home with 4-5 EWA . She has a history of prior CVA with cane, RW, shower chair available if needed. HOME SUPPORT PRIOR TO ADMISSION: The patient lived alone. Physical Therapy Goals  Initiated 11/8/2022  1. Patient will move from supine to sit and sit to supine  in bed with modified independence within 7 day(s). 2.  Patient will transfer from bed to chair and chair to bed with modified independence using the least restrictive device within 7 day(s). 3.  Patient will perform sit to stand with modified independence within 7 day(s). 4.  Patient will ambulate with modified independence for 150 feet with the least restrictive device within 7 day(s). 5.  Patient will ascend/descend 5 stairs with handrail(s) with modified independence within 7 day(s). Outcome: Progressing Towards Goal       PHYSICAL THERAPY TREATMENT  Patient: Isela Araya (63 y.o. female)  Date: 11/9/2022  Diagnosis: RAYMOND (acute kidney injury) ([facility] [address] [N17.9]  Diarrhea [R19.7] <principal problem not specified>      Precautions: Fall (droplet +)  Chart, physical therapy assessment, plan of care and goals were reviewed. ASSESSMENT  Patient continues with skilled PT services and is progressing towards goals. Pt is progressing well. Pt is hopeful to discharge today. Pt's plan is to stay with sister. Pt is not expressing any concerns about discharge. Pt was able to ambulate without A. D. but reports she owns one if needed. Pt did fatigue with gait. Educated on energy conservation.      Current Level of Function Impacting Discharge (mobility/balance): SBA    Other factors to consider for discharge: decrease activity tolerance          PLAN :  Patient continues to benefit from skilled intervention to address the above impairments. Continue treatment per established plan of care. to address goals. Recommendation for discharge: (in order for the patient to meet his/her long term goals)  Physical therapy at least 2 days/week in the home     This discharge recommendation:  Has not yet been discussed the attending provider and/or case management    IF patient discharges home will need the following DME: patient owns DME required for discharge       SUBJECTIVE:   Patient stated I am getting out of here today.     OBJECTIVE DATA SUMMARY:   Critical Behavior:  Neurologic State: Alert, Appropriate for age  Orientation Level: Oriented X4  Cognition: Appropriate for age attention/concentration     Functional Mobility Training:  Bed Mobility:                    Transfers:  Sit to Stand: Supervision  Stand to Sit: Supervision                             Balance:     Ambulation/Gait Training:  Distance (ft): 100 Feet (ft) (in the room no A. D.)     Ambulation - Level of Assistance: Stand-by assistance        Gait Abnormalities: Decreased step clearance        Base of Support: Widened        Step Length: Right shortened;Left shortened                    Stairs: Therapeutic Exercises:     Pain Rating:  none    Activity Tolerance:   requires rest breaks    After treatment patient left in no apparent distress:   Sitting in chair and Call bell within reach    COMMUNICATION/COLLABORATION:   The patients plan of care was discussed with: Registered nurse.      Maru Marquez PTA   Time Calculation: 17 mins

## 2022-11-09 NOTE — PROGRESS NOTES
Renal Progress Note    NAME:  Leanna Durán   :   1960   MRN:   [id number]     Date/Time:  2022 10:23 AM      Assessment:     Acute Kidney Injury --> sec to vol depletion with GI losses - Scr remains above her baseline  CKD (Stage 4/Stage 5) . Patient is aware of future need for RRT. She was already referred to [facility] educational seminar. GFR is declining due to Covid. Scr peaked and is coming down now. C Diff diarrhea,  diarrhea has improved  Met Acidosis - sec to CKD and partly diarrhea-improved, off bicarb drip, CO2 high  Hypomagnesemia  HTN-iBPs elevated  DM2  Acute hypoxic resp failure, due to Covid pneumonia, the O2 requirements are down   Acute Covid 19  Severe anxiety. Hypocalcemia from RAYMOND  Hypomagnesemia - poor oral intake  Edema       Plan:     No immediate need for RRT, but patient's renal function may worsen further as it is the case with acute Covid. Replace Ca and Mg-prn-corrected calcium in nl range  Hold bumex, off IVF  Reastart Na Bicarb po when CO2 down  Replete Mg prn  Increased hydralazine to 50 mg tid  Encourage oral intake. Avoid NSAIDs + IV contrast.  Dose meds for GFR. BP control  Renal panel in AM  Transfuse for Hb 7 or less  Needs close follow up with Dr Geno Gonzalez when DC home         Subjective:     10/28 feeling ok, still has diarrhea, C diff+ on po vanco, cr at baseline, Na higher-hypoglycemic this am  10/31 was hypoxic yesterday and SOB, CXR showed pulm edema vs PNA. Has productive cough, febrile. Diarrhea has stopped. Given one dose bumex and started doxy. She feels better today, requires 2 lit O2  22 Patient c/o SOB, cough, sputum production and abdominal pain. 22 patient wants to go home. She has anxiety flare up. Her SOB is improved.    feels ok, no SOB, on RA  , no complaint, still has diarrhea, no SOB, on RA   feels better, no more Diarrhae    Review of Systems:  Y  N       Y  N  []         []          Fever/chills []         []          Chest Pain  [x]         []          Cough                                                       []         []          Diarrhea   [x]         []          Sputum                                                     []         []          Constipation  [x]         []          SOB/WALLACE                                                []         []          Nausea/Vomit  [x]         []          Abd Pain                                                    []         []          Tolerating PT  []         []          Dysuria                                                      []         []          Tolerating Diet     []        Unable to obtain  ROS due to  []        mental status change  []        sedated   []        intubated    Medications reviewed:  Current Facility-Administered Medications   Medication Dose Route Frequency    hydrALAZINE (APRESOLINE) tablet 50 mg  50 mg Oral TID    cholestyramine-aspartame (QUESTRAN LIGHT) packet 2 g  2 g Oral BID WITH MEALS    calcium carbonate (TUMS) chewable tablet 200 mg [elemental]  200 mg Oral TID WITH MEALS    insulin glargine (LANTUS) injection 18 Units  18 Units SubCUTAneous QHS    insulin lispro (HUMALOG) injection 4 Units  4 Units SubCUTAneous TIDAC    insulin lispro (HUMALOG) injection   SubCUTAneous AC&HS    pantoprazole (PROTONIX) tablet 40 mg  40 mg Oral ACB    dexAMETHasone (DECADRON) tablet 6 mg  6 mg Oral DAILY    benzonatate (TESSALON) capsule 100 mg  100 mg Oral TID PRN    guaiFENesin (ROBITUSSIN) 100 mg/5 mL oral liquid 100 mg  100 mg Oral Q4H PRN    pseudoephedrine (SUDAFED) tablet 30 mg  30 mg Oral Q6H PRN    LORazepam (ATIVAN) tablet 0.5 mg  0.5 mg Oral BID PRN    albuterol-ipratropium (DUO-NEB) 2.5 MG-0.5 MG/3 ML  3 mL Nebulization Q6H PRN    acetaminophen (TYLENOL) tablet 1,000 mg  1,000 mg Oral Q6H PRN    [Held by provider] bumetanide (BUMEX) tablet 2 mg  2 mg Oral DAILY    heparin (porcine) injection 5,000 Units  5,000 Units SubCUTAneous Q8H    prochlorperazine (COMPAZINE) tablet 5 mg  5 mg Oral Q6H PRN    Or    prochlorperazine (COMPAZINE) injection 5 mg  5 mg IntraVENous Q6H PRN    NIFEdipine ER (PROCARDIA XL) tablet 60 mg  60 mg Oral BID    melatonin tablet 3 mg  3 mg Oral QHS PRN    zolpidem (AMBIEN) tablet 5 mg  5 mg Oral QHS PRN    ondansetron (ZOFRAN) injection 4 mg  4 mg IntraVENous Q6H PRN    hydrALAZINE (APRESOLINE) 20 mg/mL injection 10 mg  10 mg IntraVENous Q6H PRN    glucose chewable tablet 16 g  4 Tablet Oral PRN    glucagon (GLUCAGEN) injection 1 mg  1 mg IntraMUSCular PRN    dextrose 10 % infusion 0-250 mL  0-250 mL IntraVENous PRN    atorvastatin (LIPITOR) tablet 40 mg  40 mg Oral DAILY    carvediloL (COREG) tablet 25 mg  25 mg Oral BID WITH MEALS        Objective:   Vitals:  Visit Vitals  BP (!) 165/56 (BP 1 Location: Right upper arm, BP Patient Position: At rest;Sitting)   Pulse 69   Temp 98 °F (36.7 °C)   Resp 17   Ht 5' 4\" (1.626 m)   Wt 90.4 kg (199 lb 4.7 oz)   LMP 10/07/2012   SpO2 96%   BMI 34.21 kg/m²     Temp (24hrs), Av °F (36.7 °C), Min:97.5 °F (36.4 °C), Max:98.5 °F (36.9 °C)    O2 Flow Rate (L/min): 1 l/min O2 Device: None (Room air)    Last 24hr Input/Output:  No intake or output data in the 24 hours ending 22 1010       PHYSICAL EXAM:      General:    Awake and alert, uncomfortable. Eyes:   No icterus    Lungs:   CTA    Heart:   RRR, No S 3 gallop, no pericardial rub  . Abdomen:   Not distended. Not tender    Extremities: 2+ edema    Psych:  Calm    Neurologic: Responding appropriately.         []        Telemetry Reviewed     []        NSR []        PAC/PVCs   []        Afib  []        Paced   []        NSVT   []        Hough []        NGT  []        Intubated on vent    Lab Data Reviewed:    Recent Results (from the past 24 hour(s))   GLUCOSE, POC    Collection Time: 22 10:58 AM   Result Value Ref Range    Glucose (POC) 137 (H) 65 - 117 mg/dL Performed by Alina Maynard    GLUCOSE, POC    Collection Time: 11/08/22  5:04 PM   Result Value Ref Range    Glucose (POC) 255 (H) 65 - 117 mg/dL    Performed by Elisabeth BaumRN)    GLUCOSE, POC    Collection Time: 11/08/22  8:57 PM   Result Value Ref Range    Glucose (POC) 266 (H) 65 - 117 mg/dL    Performed by Cuong Siegel    CBC WITH AUTOMATED DIFF    Collection Time: 11/09/22 12:58 AM   Result Value Ref Range    WBC 7.9 3.6 - 11.0 K/uL    RBC 2.63 (L) 3.80 - 5.20 M/uL    HGB 7.0 (L) 11.5 - 16.0 g/dL    HCT 22.4 (L) 35.0 - 47.0 %    MCV 85.2 80.0 - 99.0 FL    MCH 26.6 26.0 - 34.0 PG    MCHC 31.3 30.0 - 36.5 g/dL    RDW 15.0 (H) 11.5 - 14.5 %    PLATELET 782 075 - 425 K/uL    MPV 11.6 8.9 - 12.9 FL    NRBC 0.0 0  WBC    ABSOLUTE NRBC 0.00 0.00 - 0.01 K/uL    NEUTROPHILS 84 (H) 32 - 75 %    LYMPHOCYTES 8 (L) 12 - 49 %    MONOCYTES 6 5 - 13 %    EOSINOPHILS 0 0 - 7 %    BASOPHILS 0 0 - 1 %    IMMATURE GRANULOCYTES 2 (H) 0.0 - 0.5 %    ABS. NEUTROPHILS 6.6 1.8 - 8.0 K/UL    ABS. LYMPHOCYTES 0.6 (L) 0.8 - 3.5 K/UL    ABS. MONOCYTES 0.5 0.0 - 1.0 K/UL    ABS. EOSINOPHILS 0.0 0.0 - 0.4 K/UL    ABS. BASOPHILS 0.0 0.0 - 0.1 K/UL    ABS. IMM.  GRANS. 0.2 (H) 0.00 - 0.04 K/UL    DF SMEAR SCANNED      PLATELET COMMENTS Large Platelets      RBC COMMENTS ANISOCYTOSIS  1+        RBC COMMENTS OVALOCYTES  PRESENT       MAGNESIUM    Collection Time: 11/09/22 12:58 AM   Result Value Ref Range    Magnesium 1.7 1.6 - 2.4 mg/dL   PHOSPHORUS    Collection Time: 11/09/22 12:58 AM   Result Value Ref Range    Phosphorus 3.6 2.6 - 4.7 MG/DL   METABOLIC PANEL, COMPREHENSIVE    Collection Time: 11/09/22 12:58 AM   Result Value Ref Range    Sodium 138 136 - 145 mmol/L    Potassium 3.5 3.5 - 5.1 mmol/L    Chloride 103 97 - 108 mmol/L    CO2 31 21 - 32 mmol/L    Anion gap 4 (L) 5 - 15 mmol/L    Glucose 173 (H) 65 - 100 mg/dL    BUN 67 (H) 6 - 20 MG/DL    Creatinine 6.05 (H) 0.55 - 1.02 MG/DL    BUN/Creatinine ratio 11 (L) 12 - 20 eGFR 7 (L) >60 ml/min/1.73m2    Calcium 6.4 (LL) 8.5 - 10.1 MG/DL    Bilirubin, total 0.3 0.2 - 1.0 MG/DL    ALT (SGPT) 35 12 - 78 U/L    AST (SGOT) 25 15 - 37 U/L    Alk.  phosphatase 69 45 - 117 U/L    Protein, total 6.0 (L) 6.4 - 8.2 g/dL    Albumin 2.5 (L) 3.5 - 5.0 g/dL    Globulin 3.5 2.0 - 4.0 g/dL    A-G Ratio 0.7 (L) 1.1 - 2.2     GLUCOSE, POC    Collection Time: 11/09/22  5:47 AM   Result Value Ref Range    Glucose (POC) 99 65 - 117 mg/dL    Performed by Sofy Schreiber        Total time spent with patient:  []        15   []        25   []        35   []         __ minutes    []        Critical Care Provided    Care Plan discussed with:    [x]        Patient   []        Family    []        Care Manager   []        Consultant/Specialist :      []          >50% of visit spent in counseling and coordination of care   (Discussed []        CODE status,  []        Care Plan, []        D/C Planning)    ___________________________________________________    Attending Physician: Nuria Diaz MD

## 2022-11-09 NOTE — PROGRESS NOTES
Called patient's daughter to advised mother is being discharge; Patient's sister is picking patient up. Spoke with patient's daughter to advise medications script will be at the [facility] on [address]. Daughter advised it was ok for patient's sister to go with patient's sister. Patient daughter advised she would get the medications from [facility] on her way home from work. 14:04 patient discharge instructions given.   Jonathan Liz

## 2022-11-09 NOTE — PROGRESS NOTES
[address] [facility].  [address] [place] [address] E Mookie Landeros, [address]   [phone]                     GI PROGRESS NOTE    Patient Name: Hillary Back      : 1960      MRN: [id number]  Admit Date: 10/25/2022  Today's Date: 2022  CC: diarrhea     Subjective:     Ms. Reina Pretty is feeling better this morning. She slept overnight and appears less anxious. Bowel movements have improved, described as loose no longer watery. She is tolerating regular diet. No N/V or abdominal pain. Objective:     Blood pressure (!) 142/57, pulse 61, temperature 98.3 °F (36.8 °C), resp. rate 18, height 5' 4\" (1.626 m), weight 90.4 kg (199 lb 4.7 oz), last menstrual period 10/07/2012, SpO2 98 %. Physical Exam:  General appearance: cooperative, no distress, appears stated age  Skin: Extremities and face reveal no rashes. HEENT: Sclerae anicteric. Cardiovascular: Regular rate and rhythm. No murmurs, gallops, or rubs. Respiratory: Diminished breath sounds with no wheezes, rales, or rhonchi. GI: Abdomen nondistended, soft, and nontender. Normal active bowel sounds  Rectal: Deferred   Musculoskeletal: No pitting edema of the lower legs. Neurological: Patient is alert and oriented.    Psychiatric:  Anxiety improved from yesterday       Data Review:    Recent Results (from the past 24 hour(s))   GLUCOSE, POC    Collection Time: 22  5:04 PM   Result Value Ref Range    Glucose (POC) 255 (H) 65 - 117 mg/dL    Performed by Spike Monroy (RN)    GLUCOSE, POC    Collection Time: 22  8:57 PM   Result Value Ref Range    Glucose (POC) 266 (H) 65 - 117 mg/dL    Performed by Sergio Wilburn    CBC WITH AUTOMATED DIFF    Collection Time: 22 12:58 AM   Result Value Ref Range    WBC 7.9 3.6 - 11.0 K/uL    RBC 2.63 (L) 3.80 - 5.20 M/uL    HGB 7.0 (L) 11.5 - 16.0 g/dL    HCT 22.4 (L) 35.0 - 47.0 %    MCV 85.2 80.0 - 99.0 FL    MCH 26.6 26.0 - 34.0 PG    MCHC 31.3 30.0 - 36.5 g/dL    RDW 15.0 (H) 11.5 - 14.5 %    PLATELET 086 123 - 454 K/uL    MPV 11.6 8.9 - 12.9 FL    NRBC 0.0 0  WBC    ABSOLUTE NRBC 0.00 0.00 - 0.01 K/uL    NEUTROPHILS 84 (H) 32 - 75 %    LYMPHOCYTES 8 (L) 12 - 49 %    MONOCYTES 6 5 - 13 %    EOSINOPHILS 0 0 - 7 %    BASOPHILS 0 0 - 1 %    IMMATURE GRANULOCYTES 2 (H) 0.0 - 0.5 %    ABS. NEUTROPHILS 6.6 1.8 - 8.0 K/UL    ABS. LYMPHOCYTES 0.6 (L) 0.8 - 3.5 K/UL    ABS. MONOCYTES 0.5 0.0 - 1.0 K/UL    ABS. EOSINOPHILS 0.0 0.0 - 0.4 K/UL    ABS. BASOPHILS 0.0 0.0 - 0.1 K/UL    ABS. IMM. GRANS. 0.2 (H) 0.00 - 0.04 K/UL    DF SMEAR SCANNED      PLATELET COMMENTS Large Platelets      RBC COMMENTS ANISOCYTOSIS  1+        RBC COMMENTS OVALOCYTES  PRESENT       MAGNESIUM    Collection Time: 11/09/22 12:58 AM   Result Value Ref Range    Magnesium 1.7 1.6 - 2.4 mg/dL   PHOSPHORUS    Collection Time: 11/09/22 12:58 AM   Result Value Ref Range    Phosphorus 3.6 2.6 - 4.7 MG/DL   METABOLIC PANEL, COMPREHENSIVE    Collection Time: 11/09/22 12:58 AM   Result Value Ref Range    Sodium 138 136 - 145 mmol/L    Potassium 3.5 3.5 - 5.1 mmol/L    Chloride 103 97 - 108 mmol/L    CO2 31 21 - 32 mmol/L    Anion gap 4 (L) 5 - 15 mmol/L    Glucose 173 (H) 65 - 100 mg/dL    BUN 67 (H) 6 - 20 MG/DL    Creatinine 6.05 (H) 0.55 - 1.02 MG/DL    BUN/Creatinine ratio 11 (L) 12 - 20      eGFR 7 (L) >60 ml/min/1.73m2    Calcium 6.4 (LL) 8.5 - 10.1 MG/DL    Bilirubin, total 0.3 0.2 - 1.0 MG/DL    ALT (SGPT) 35 12 - 78 U/L    AST (SGOT) 25 15 - 37 U/L    Alk.  phosphatase 69 45 - 117 U/L    Protein, total 6.0 (L) 6.4 - 8.2 g/dL    Albumin 2.5 (L) 3.5 - 5.0 g/dL    Globulin 3.5 2.0 - 4.0 g/dL    A-G Ratio 0.7 (L) 1.1 - 2.2     GLUCOSE, POC    Collection Time: 11/09/22  5:47 AM   Result Value Ref Range    Glucose (POC) 99 65 - 117 mg/dL    Performed by Indiana Kyle, POC    Collection Time: 11/09/22 11:27 AM   Result Value Ref Range    Glucose (POC) 69 65 - 117 mg/dL    Performed by Yohana CARRINGTON W/O DIFF Collection Time: 11/09/22 11:32 AM   Result Value Ref Range    WBC 8.4 3.6 - 11.0 K/uL    RBC 2.86 (L) 3.80 - 5.20 M/uL    HGB 7.7 (L) 11.5 - 16.0 g/dL    HCT 24.5 (L) 35.0 - 47.0 %    MCV 85.7 80.0 - 99.0 FL    MCH 26.9 26.0 - 34.0 PG    MCHC 31.4 30.0 - 36.5 g/dL    RDW 15.1 (H) 11.5 - 14.5 %    PLATELET 873 997 - 120 K/uL    MPV 11.6 8.9 - 12.9 FL    NRBC 0.0 0  WBC    ABSOLUTE NRBC 0.00 0.00 - 0.01 K/uL   GLUCOSE, POC    Collection Time: 11/09/22 11:36 AM   Result Value Ref Range    Glucose (POC) 84 65 - 117 mg/dL    Performed by Virgilio Cameron / Plan :     Ms. Norma Rose was referred to our service for ongoing diarrhea after completing oral vancomycin course for C-diff. She was admitted to [facility] on 10/25/22 with diarrhea, + c-diff test and + COVID. She received 10 days of oral vancomycin and still with c/o diarrhea. She has received IV ABX in treatment of COVID PNA that may be contributing to diarrhea. CT AP on 11/1 showed no acute abdominal findings.      - Continue Cholestyramine  - Supportive care per hospitalist  - OK from GI standpoint for D/C home          Patient C/Conchita Ferrer 1106 Problem List:   Active Problems:    Diarrhea (10/25/2022)      [facility] (10/25/2022)        Time Spent with Patient: [address], NP

## 2023-05-18 RX ORDER — PANTOPRAZOLE SODIUM 40 MG/1
40 TABLET, DELAYED RELEASE ORAL
COMMUNITY
Start: 2022-11-10

## 2023-05-18 RX ORDER — NIFEDIPINE 30 MG/1
30 TABLET, FILM COATED, EXTENDED RELEASE ORAL 2 TIMES DAILY
COMMUNITY

## 2023-05-18 RX ORDER — CARVEDILOL 25 MG/1
25 TABLET ORAL 2 TIMES DAILY WITH MEALS
COMMUNITY
Start: 2022-11-09

## 2023-05-18 RX ORDER — ASPIRIN 81 MG/1
81 TABLET, CHEWABLE ORAL DAILY
COMMUNITY

## 2023-05-18 RX ORDER — INSULIN GLARGINE 100 [IU]/ML
26 INJECTION, SOLUTION SUBCUTANEOUS
COMMUNITY

## 2023-05-18 RX ORDER — SODIUM BICARBONATE 650 MG/1
650 TABLET ORAL 2 TIMES DAILY
COMMUNITY

## 2023-05-18 RX ORDER — ATORVASTATIN CALCIUM 40 MG/1
1 TABLET, FILM COATED ORAL DAILY
COMMUNITY
Start: 2019-04-15

## 2023-07-14 NOTE — PROGRESS NOTES
Bedside and Verbal shift change report given to 611 Cedric Kyle  (oncoming nurse) by Timbo Montague  (offgoing nurse). Report included the following information SBAR. Spiral Flap Text: The defect edges were debeveled with a #15 scalpel blade. Given the location of the defect, shape of the defect and the proximity to free margins a spiral flap was deemed most appropriate. Using a sterile surgical marker, an appropriate rotation flap was drawn incorporating the defect and placing the expected incisions within the relaxed skin tension lines where possible. The area thus outlined was incised deep to adipose tissue with a #15 scalpel blade. The skin margins were undermined to an appropriate distance in all directions utilizing iris scissors. Following this, the designed flap was carried over into the primary defect and sutured into place.

## 2023-08-25 ENCOUNTER — ANESTHESIA (OUTPATIENT)
Facility: HOSPITAL | Age: 63
End: 2023-08-25
Payer: MEDICAID

## 2023-08-25 ENCOUNTER — ANESTHESIA EVENT (OUTPATIENT)
Facility: HOSPITAL | Age: 63
End: 2023-08-25
Payer: MEDICAID

## 2023-08-25 ENCOUNTER — HOSPITAL ENCOUNTER (OUTPATIENT)
Facility: HOSPITAL | Age: 63
Setting detail: OUTPATIENT SURGERY
Discharge: HOME | End: 2023-08-25
Attending: INTERNAL MEDICINE | Admitting: INTERNAL MEDICINE
Payer: MEDICAID

## 2023-08-25 VITALS
HEIGHT: 63 IN | HEART RATE: 61 BPM | RESPIRATION RATE: 17 BRPM | BODY MASS INDEX: 31.08 KG/M2 | WEIGHT: 175.4 LBS | OXYGEN SATURATION: 100 % | SYSTOLIC BLOOD PRESSURE: 166 MMHG | TEMPERATURE: 98 F | DIASTOLIC BLOOD PRESSURE: 58 MMHG

## 2023-08-25 LAB
ANION GAP BLD CALC-SCNC: 10 MMOL/L (ref 10–20)
CA-I BLD-MCNC: 1.11 MMOL/L (ref 1.12–1.32)
CHLORIDE BLD-SCNC: 113 MMOL/L (ref 98–107)
CO2 BLD-SCNC: 20.8 MMOL/L (ref 21–32)
CREAT BLD-MCNC: 7.05 MG/DL (ref 0.6–1.3)
GLUCOSE BLD-MCNC: 124 MG/DL (ref 65–100)
POTASSIUM BLD-SCNC: 4.5 MMOL/L (ref 3.5–5.1)
SERVICE CMNT-IMP: ABNORMAL
SODIUM BLD-SCNC: 143 MMOL/L (ref 136–145)

## 2023-08-25 PROCEDURE — 3600007502: Performed by: INTERNAL MEDICINE

## 2023-08-25 PROCEDURE — 2500000003 HC RX 250 WO HCPCS: Performed by: ANESTHESIOLOGY

## 2023-08-25 PROCEDURE — 2580000003 HC RX 258: Performed by: INTERNAL MEDICINE

## 2023-08-25 PROCEDURE — 2709999900 HC NON-CHARGEABLE SUPPLY: Performed by: INTERNAL MEDICINE

## 2023-08-25 PROCEDURE — 3600007512: Performed by: INTERNAL MEDICINE

## 2023-08-25 PROCEDURE — 80047 BASIC METABLC PNL IONIZED CA: CPT

## 2023-08-25 PROCEDURE — 3700000001 HC ADD 15 MINUTES (ANESTHESIA): Performed by: INTERNAL MEDICINE

## 2023-08-25 PROCEDURE — 88305 TISSUE EXAM BY PATHOLOGIST: CPT

## 2023-08-25 PROCEDURE — 3700000000 HC ANESTHESIA ATTENDED CARE: Performed by: INTERNAL MEDICINE

## 2023-08-25 PROCEDURE — 6360000002 HC RX W HCPCS: Performed by: ANESTHESIOLOGY

## 2023-08-25 PROCEDURE — 7100000010 HC PHASE II RECOVERY - FIRST 15 MIN: Performed by: INTERNAL MEDICINE

## 2023-08-25 RX ORDER — SODIUM CHLORIDE 9 MG/ML
25 INJECTION, SOLUTION INTRAVENOUS PRN
Status: DISCONTINUED | OUTPATIENT
Start: 2023-08-25 | End: 2023-08-25 | Stop reason: HOSPADM

## 2023-08-25 RX ORDER — SODIUM CHLORIDE 0.9 % (FLUSH) 0.9 %
5-40 SYRINGE (ML) INJECTION PRN
Status: DISCONTINUED | OUTPATIENT
Start: 2023-08-25 | End: 2023-08-25 | Stop reason: HOSPADM

## 2023-08-25 RX ORDER — LIDOCAINE HYDROCHLORIDE 20 MG/ML
INJECTION, SOLUTION EPIDURAL; INFILTRATION; INTRACAUDAL; PERINEURAL PRN
Status: DISCONTINUED | OUTPATIENT
Start: 2023-08-25 | End: 2023-08-25 | Stop reason: SDUPTHER

## 2023-08-25 RX ORDER — SODIUM CHLORIDE 0.9 % (FLUSH) 0.9 %
5-40 SYRINGE (ML) INJECTION EVERY 12 HOURS SCHEDULED
Status: DISCONTINUED | OUTPATIENT
Start: 2023-08-25 | End: 2023-08-25 | Stop reason: HOSPADM

## 2023-08-25 RX ORDER — SODIUM CHLORIDE 9 MG/ML
INJECTION, SOLUTION INTRAVENOUS CONTINUOUS
Status: DISCONTINUED | OUTPATIENT
Start: 2023-08-25 | End: 2023-08-25 | Stop reason: HOSPADM

## 2023-08-25 RX ADMIN — SODIUM CHLORIDE: 9 INJECTION, SOLUTION INTRAVENOUS at 11:04

## 2023-08-25 RX ADMIN — PROPOFOL 150 MG: 10 INJECTION, EMULSION INTRAVENOUS at 11:45

## 2023-08-25 RX ADMIN — LIDOCAINE HYDROCHLORIDE 40 MG: 20 INJECTION, SOLUTION EPIDURAL; INFILTRATION; INTRACAUDAL; PERINEURAL at 11:25

## 2023-08-25 ASSESSMENT — ENCOUNTER SYMPTOMS: SHORTNESS OF BREATH: 1

## 2023-08-25 ASSESSMENT — PAIN - FUNCTIONAL ASSESSMENT: PAIN_FUNCTIONAL_ASSESSMENT: NONE - DENIES PAIN

## 2023-08-25 NOTE — PROGRESS NOTES
Endoscopy Case End Note:    Procedure scope was pre-cleaned, per protocol, at bedside by Tg Islas RN. Report received from anesthesia. See anesthesia flowsheet for intra-procedure vital signs and events. Belongings returned to patient.     Glasses put back on patient

## 2023-08-25 NOTE — OP NOTE
Colonoscopy Procedure Note      Indications:   screening colonoscopy, hx/o polyps      : Praful Martinez MD    Staff: Circulator: Christine Lobo RN  Scrub Person First: Joshua Cali RN    Referring Provider: Alanna Figueroa DO    Sedation:  MAC anesthesia Propofol    Procedure Details:  After informed consent was obtained with all risks and benefits of procedure explained and preoperative exam completed, the patient was taken to the endoscopy suite and placed in the left lateral decubitus position. Upon sequential sedation as per above, a digital rectal exam was performed per below. The Olympus videocolonoscope was inserted in the rectum and carefully advanced to the cecum, which was identified by the ileocecal valve and appendiceal orifice. The quality of preparation was  excellent BPS 9 . The colonoscope was slowly withdrawn with careful evaluation between folds. Retroflexion in the rectum was performed. Findings:   TAHIR: unremarkable  Rectum: normal  no mucosal lesion appreciated  Sigmoid: normal  no mucosal lesion appreciated  Descending Colon: normal  no mucosal lesion appreciated  Transverse Colon: normal  no mucosal lesion appreciated  Ascending Colon: distal ascending was one 3mm sessile polyp removed with cold snare polypectomy, retrieved, minimal bleeding  Cecum: normal  no mucosal lesion appreciated  Terminal Ileum: not intubated    Complications: None. EBL:  minimal    Impression:    See Findings above    Recommendations:   - If biopsies were obtained, await pathology. You should receive a letter within 2 weeks. - Resume normal medications.  - Recommend repeat colonoscopy is NOT recommended due to severe life limiting comorbidities (end stage kidney disease refusing dialysis). IF she goes onto dialysis, repeat in 7 years. Discharge Disposition:  Home in the company of a  when able to ambulate.     Praful Martinez MD  8/25/2023  11:46 AM

## 2023-08-25 NOTE — PROGRESS NOTES
ARRIVAL INFORMATION:  Verified patient name and date of birth, scheduled procedure, and informed consent. : Naveen (daughter) contact number:  Physician and staff can share information with the . Belongings with patient include:  Clothing,glasses, cane        GI FOCUSED ASSESSMENT:  Neuro: Awake, alert, oriented x4  Respiratory: even and unlabored   GI: soft and non-distended  EKG Rhythm: sinus bradycardia    Education:Reviewed general discharge instructions and  information. EPOC obtained during pre-procedure due to patient's kidney disease, EPOC results obtained and given to Dr. Clementina Edwards with anesthesia.

## 2023-08-25 NOTE — DISCHARGE INSTRUCTIONS
Davidson Swift  [id number]  1960    It was my pleasure seeing you for your procedure. You will also receive a summary report with the findings from this procedure and any further recommendations. If you had polyps removed or biopsies taken during your procedure, you will receive a separate letter from me within the next 2 weeks. If you don't receive this letter or if you have any questions, please call my office [phone]. Please take note of the post procedure instructions listed below. Best Wishes,    Dr. Celine Haywood    These instructions give you information on caring for yourself after your procedure. Call your doctor if you have any problems or questions after your procedure. HOME CARE  Walk if you have belly cramping or gas. Walking will help get rid of the air and reduce the bloated feeling in your belly (abdomen). Your IV site (where you received drugs) may be tender to touch. Place warm towels on the site; keep your arm up on two pillows if you have any swelling or soreness in the area. You may shower. ACTIVITY:  Take frequent rest periods and move at a slower pace for the next 24 hours. .  You may resume your regular activity tomorrow if you are feeling back to normal.  Do not drive or ride a bicycle for at least 24 hours (because of the medicine (anesthesia) used during the test). Do not sign any important legal documents or use or operate any machinery for 24 hours  Do not take sleeping medicines/nerve drugs for 24 hours unless the doctor tells you. You can return to work/school tomorrow unless otherwise instructed. NUTRITION:  Drink plenty of fluids to keep your pee (urine) clear or pale yellow  Begin with a light meal and progress to your normal diet. Heavy or fried foods are harder to digest and may make you feel sick to your stomach (nauseated).   Once you are feeling back to normal, you may resume your normal

## 2023-08-25 NOTE — PROGRESS NOTES
Patient has reviewed, received and signed discharge instructions. Verbalized understanding. Patient discharged from unit via wheelchair. No distress noted. Discharge instructions reviewed and signed by responsible party as well. Copy of signatures put in medical records. Endoscopy recovery  Patient returned to baseline, vital signs stable (see vital sign flowsheet). Patient offered liquids and tolerated well. Respiratory status within defined limits. Abdomen soft not tender. Skin with in defined limits. Responsible party driving patient home was given the opportunity to ask questions. Patient discharged with documented belongings.

## 2023-08-25 NOTE — H&P
[facility]  Outpatient History and Physical    Patient: Jami Torres    Physician: Marcy Pickens MD    Vital Signs: Blood pressure (!) 149/61, pulse 53, resp. rate 14, height 1.6 m (5' 3\"), weight 79.6 kg (175 lb 6.4 oz), SpO2 97 %. Allergies: Allergies   Allergen Reactions    Sulfur Hives    Amoxicillin Nausea Only       Chief Complaint: high risk screening colonoscopy    History:  Past Medical History:   Diagnosis Date    Diabetes ([address])     Esophagitis, unspecified 2011    Hypercholesteremia 2011    Kidney disease     Leiomyoma of uterus, unspecified 2011    Other and unspecified hyperlipidemia 2011    Other specified erythematous condition(695.89) 2011    Type I (juvenile type) diabetes mellitus without mention of complication, uncontrolled 2011    Type I (juvenile type) diabetes mellitus without mention of complication, uncontrolled 2011    Unspecified essential hypertension 2011    Unspecified visual loss 2011    rt eye      Past Surgical History:   Procedure Laterality Date    COLONOSCOPY        Social History     Socioeconomic History    Marital status:      Spouse name: None    Number of children: None    Years of education: None    Highest education level: None   Tobacco Use    Smoking status: Former     Types: Cigarettes     Quit date: 2010     Years since quittin.6    Smokeless tobacco: Never   Substance and Sexual Activity    Alcohol use: No    Drug use: No      Family History   Problem Relation Age of Onset    Hypertension Mother     Diabetes Mother     Diabetes Father        Medications:   Prior to Admission medications    Medication Sig Start Date End Date Taking?  Authorizing Provider   aspirin 81 MG chewable tablet Take 1 tablet by mouth daily    Ar Automatic Reconciliation   atorvastatin (LIPITOR) 40 MG tablet Take 1 tablet by mouth daily 4/15/19   Ar Automatic Reconciliation

## 2023-08-25 NOTE — ANESTHESIA POSTPROCEDURE EVALUATION
Department of Anesthesiology  Postprocedure Note    Patient: Larissa Silvestre  MRN: [id number]  YOB: 1960  Date of evaluation: 8/25/2023      Procedure Summary     Date: 08/25/23 Room / Location: [facility] ENDO 01 / [facility] ENDOSCOPY    Anesthesia Start: 9595 Anesthesia Stop: 7169    Procedure: COLONOSCOPY POLYPECTOMY SNARE/COLD BIOPSY Diagnosis:       Screening for colon cancer      Polyp of ascending colon, unspecified type      (Screening for colon cancer [Z12.11])    Surgeons: Robe Peralta MD Responsible Provider: Purnima Baptiste DO    Anesthesia Type: MAC ASA Status: 4          Anesthesia Type: No value filed.     Albino Phase I: Albino Score: 10    Albino Phase II: Albino Score: 10      Anesthesia Post Evaluation    Patient location during evaluation: PACU  Patient participation: complete - patient participated  Level of consciousness: awake  Airway patency: patent  Nausea & Vomiting: no vomiting and no nausea  Complications: no  Cardiovascular status: hemodynamically stable  Respiratory status: acceptable  Hydration status: euvolemic

## 2024-02-23 ENCOUNTER — HOSPITAL ENCOUNTER (OUTPATIENT)
Facility: HOSPITAL | Age: 64
End: 2024-02-23
Payer: MEDICARE

## 2024-02-23 DIAGNOSIS — N18.5 CKD (CHRONIC KIDNEY DISEASE), STAGE V (HCC): ICD-10-CM

## 2024-02-23 PROCEDURE — 71046 X-RAY EXAM CHEST 2 VIEWS: CPT

## 2024-02-29 ENCOUNTER — HOSPITAL ENCOUNTER (OUTPATIENT)
Facility: HOSPITAL | Age: 64
Discharge: HOME | End: 2024-02-29
Payer: MEDICARE

## 2024-02-29 VITALS — BODY MASS INDEX: 31.01 KG/M2 | WEIGHT: 175 LBS | HEIGHT: 63 IN

## 2024-02-29 DIAGNOSIS — Z12.31 VISIT FOR SCREENING MAMMOGRAM: ICD-10-CM

## 2024-02-29 PROCEDURE — 77067 SCR MAMMO BI INCL CAD: CPT

## 2024-04-11 ENCOUNTER — HOSPITAL ENCOUNTER (OUTPATIENT)
Facility: HOSPITAL | Age: 64
Discharge: HOME | End: 2024-04-11
Payer: MEDICARE

## 2024-04-11 VITALS
DIASTOLIC BLOOD PRESSURE: 59 MMHG | RESPIRATION RATE: 16 BRPM | SYSTOLIC BLOOD PRESSURE: 159 MMHG | TEMPERATURE: 97.9 F | OXYGEN SATURATION: 100 % | WEIGHT: 162.7 LBS | HEART RATE: 58 BPM | BODY MASS INDEX: 27.78 KG/M2 | HEIGHT: 64 IN

## 2024-04-11 LAB
ALBUMIN SERPL-MCNC: 3.5 G/DL (ref 3.5–5)
ALBUMIN/GLOB SERPL: 1.1 (ref 1.1–2.2)
ALP SERPL-CCNC: 77 U/L (ref 45–117)
ALT SERPL-CCNC: 12 U/L (ref 12–78)
ANION GAP SERPL CALC-SCNC: 9 MMOL/L (ref 5–15)
AST SERPL-CCNC: 7 U/L (ref 15–37)
BILIRUB SERPL-MCNC: 0.4 MG/DL (ref 0.2–1)
BUN SERPL-MCNC: 48 MG/DL (ref 6–20)
BUN/CREAT SERPL: 7 (ref 12–20)
CALCIUM SERPL-MCNC: 8.4 MG/DL (ref 8.5–10.1)
CHLORIDE SERPL-SCNC: 105 MMOL/L (ref 97–108)
CO2 SERPL-SCNC: 26 MMOL/L (ref 21–32)
CREAT SERPL-MCNC: 7.25 MG/DL (ref 0.55–1.02)
EKG ATRIAL RATE: 58 BPM
EKG DIAGNOSIS: NORMAL
EKG P AXIS: 49 DEGREES
EKG P-R INTERVAL: 134 MS
EKG Q-T INTERVAL: 514 MS
EKG QRS DURATION: 96 MS
EKG QTC CALCULATION (BAZETT): 504 MS
EKG R AXIS: 14 DEGREES
EKG T AXIS: 60 DEGREES
EKG VENTRICULAR RATE: 58 BPM
ERYTHROCYTE [DISTWIDTH] IN BLOOD BY AUTOMATED COUNT: 15.2 % (ref 11.5–14.5)
GLOBULIN SER CALC-MCNC: 3.3 G/DL (ref 2–4)
GLUCOSE SERPL-MCNC: 216 MG/DL (ref 65–100)
HCT VFR BLD AUTO: 31.6 % (ref 35–47)
HGB BLD-MCNC: 9 G/DL (ref 11.5–16)
MCH RBC QN AUTO: 26.7 PG (ref 26–34)
MCHC RBC AUTO-ENTMCNC: 28.5 G/DL (ref 30–36.5)
MCV RBC AUTO: 93.8 FL (ref 80–99)
NRBC # BLD: 0 K/UL (ref 0–0.01)
NRBC BLD-RTO: 0 PER 100 WBC
PLATELET # BLD AUTO: 174 K/UL (ref 150–400)
PMV BLD AUTO: 11.3 FL (ref 8.9–12.9)
POTASSIUM SERPL-SCNC: 3.9 MMOL/L (ref 3.5–5.1)
PROT SERPL-MCNC: 6.8 G/DL (ref 6.4–8.2)
RBC # BLD AUTO: 3.37 M/UL (ref 3.8–5.2)
SODIUM SERPL-SCNC: 140 MMOL/L (ref 136–145)
WBC # BLD AUTO: 6.1 K/UL (ref 3.6–11)

## 2024-04-11 PROCEDURE — 85027 COMPLETE CBC AUTOMATED: CPT

## 2024-04-11 PROCEDURE — 36415 COLL VENOUS BLD VENIPUNCTURE: CPT

## 2024-04-11 PROCEDURE — 80053 COMPREHEN METABOLIC PANEL: CPT

## 2024-04-11 RX ORDER — CALCIFEDIOL 30 UG/1
30 CAPSULE, EXTENDED RELEASE ORAL DAILY
COMMUNITY

## 2024-04-11 RX ORDER — INSULIN GLARGINE 100 [IU]/ML
10 INJECTION, SOLUTION SUBCUTANEOUS DAILY
COMMUNITY

## 2024-04-11 RX ORDER — SODIUM CHLORIDE 9 MG/ML
INJECTION, SOLUTION INTRAVENOUS CONTINUOUS
Status: CANCELLED | OUTPATIENT
Start: 2024-04-15

## 2024-04-11 RX ORDER — ISOSORBIDE DINITRATE 20 MG/1
20 TABLET ORAL 3 TIMES DAILY
COMMUNITY

## 2024-04-11 RX ORDER — BUMETANIDE 2 MG/1
2 TABLET ORAL DAILY
COMMUNITY

## 2024-04-11 RX ORDER — ATORVASTATIN CALCIUM 80 MG/1
80 TABLET, FILM COATED ORAL DAILY
COMMUNITY

## 2024-04-11 RX ORDER — HYDRALAZINE HYDROCHLORIDE 50 MG/1
50 TABLET, FILM COATED ORAL 3 TIMES DAILY
COMMUNITY

## 2024-04-11 ASSESSMENT — PAIN SCALES - GENERAL: PAINLEVEL_OUTOF10: 0

## 2024-04-11 NOTE — PROGRESS NOTES
[facility]  Preoperative Instructions        Surgery Date 4/15/2024          Time of Arrival to be called @ [phone]     1. On the day of your surgery, please report to the Surgical Services Registration Desk and sign in at your designated time. The Surgery Center is located to the right of the Emergency Room.     2. You must have someone with you to drive you home. You should not drive a car for 24 hours following surgery. Please make arrangements for a friend or family member to stay with you for the first 24 hours after your surgery.    3. Do not have anything to eat or drink (including water, gum, mints, coffee, juice) after midnight ??      .?This may not apply to medications prescribed by your physician. ?(Please note below the special instructions with medications to take the morning of your procedure.)    4. We recommend you do not drink any alcoholic beverages for 24 hours before and after your surgery.    5. Contact your surgeon’s office for instructions on the following medications: non-steroidal anti-inflammatory drugs (i.e. Advil, Aleve), vitamins, and supplements. (Some surgeon’s will want you to stop these medications prior to surgery and others may allow you to take them)  **If you are currently taking Plavix, Coumadin, Aspirin and/or other blood-thinning agents, contact your surgeon for instructions.** Your surgeon will partner with the physician prescribing these medications to determine if it is safe to stop or if you need to continue taking.  Please do not stop taking these medications without instructions from your surgeon    6. Wear comfortable clothes.  Wear glasses instead of contacts.  Do not bring any money or jewelry. Please bring picture ID, insurance card, and any prearranged co-payment or hospital payment.  Do not wear make-up, particularly mascara the morning of your surgery.  Do not wear nail polish, particularly if you are having foot /hand surgery.   Wear your hair loose or down, no ponytails, buns, mercedes pins or clips.  All body piercings must be removed.  Please shower with antibacterial soap for three consecutive days before and on the morning of surgery, but do not apply any lotions, powders or deodorants after the shower on the day of surgery. Please use a fresh towels after each shower. Please sleep in clean clothes and change bed linens the night before surgery.  Please do not shave for 48 hours prior to surgery. Shaving of the face is acceptable.    7. You should understand that if you do not follow these instructions your surgery may be cancelled.  If your physical condition changes (I.e. fever, cold or flu) please contact your surgeon as soon as possible.    8. It is important that you be on time.  If a situation occurs where you may be late, please call [phone] (OR Holding Area).    9. If you have any questions and or problems, please call [phone] (Pre-admission Testing).    10. Your surgery time may be subject to change.  You will receive a phone call the evening prior if your time changes.    11.  If having outpatient surgery, you must have someone to drive you here, stay with you during the duration of your stay, and to drive you home at time of discharge.     Special Instructions: Only take 1/2 dose of basaglar kwikpen the morning of surgery.Call [phone] for questions regarding blood sugar the day of surgery     TAKE ALL MEDICATIONS DAY OF SURGERY EXCEPT: no aspirin the day of surgery. May take all other morning medications with a sip of water and only take 1/2 dose of basaglar kwikpen      I understand a pre-operative phone call will be made to verify my surgery time.  In the event that I am not available, I give permission for a message to be left on my answering service and/or with another person?  yes         ___________________      __________   _________    (Signature of Patient)             (Witness)                (Date

## 2024-04-15 ENCOUNTER — ANESTHESIA EVENT (OUTPATIENT)
Facility: HOSPITAL | Age: 64
End: 2024-04-15
Payer: MEDICARE

## 2024-04-15 ENCOUNTER — ANESTHESIA (OUTPATIENT)
Facility: HOSPITAL | Age: 64
End: 2024-04-15
Payer: MEDICARE

## 2024-04-15 ENCOUNTER — HOSPITAL ENCOUNTER (OUTPATIENT)
Facility: HOSPITAL | Age: 64
Setting detail: OUTPATIENT SURGERY
Discharge: HOME | End: 2024-04-15
Attending: SURGERY | Admitting: SURGERY
Payer: MEDICARE

## 2024-04-15 VITALS
RESPIRATION RATE: 13 BRPM | DIASTOLIC BLOOD PRESSURE: 68 MMHG | TEMPERATURE: 98.2 F | HEART RATE: 60 BPM | BODY MASS INDEX: 27.36 KG/M2 | SYSTOLIC BLOOD PRESSURE: 151 MMHG | HEIGHT: 64 IN | OXYGEN SATURATION: 90 % | WEIGHT: 160.27 LBS

## 2024-04-15 DIAGNOSIS — G89.18 POST-OP PAIN: Primary | ICD-10-CM

## 2024-04-15 LAB
ANION GAP BLD CALC-SCNC: 10.3 MMOL/L (ref 10–20)
CA-I BLD-MCNC: 1.09 MMOL/L (ref 1.12–1.32)
CHLORIDE BLD-SCNC: 105 MMOL/L (ref 98–107)
CO2 BLD-SCNC: 23.7 MMOL/L (ref 21–32)
CREAT BLD-MCNC: 8.45 MG/DL (ref 0.6–1.3)
GLUCOSE BLD STRIP.AUTO-MCNC: 121 MG/DL (ref 65–117)
GLUCOSE BLD-MCNC: 114 MG/DL (ref 65–100)
POTASSIUM BLD-SCNC: 4.2 MMOL/L (ref 3.5–5.1)
SERVICE CMNT-IMP: ABNORMAL
SERVICE CMNT-IMP: ABNORMAL
SODIUM BLD-SCNC: 139 MMOL/L (ref 136–145)

## 2024-04-15 PROCEDURE — 6360000002 HC RX W HCPCS: Performed by: SURGERY

## 2024-04-15 PROCEDURE — 80047 BASIC METABLC PNL IONIZED CA: CPT

## 2024-04-15 PROCEDURE — 3600000012 HC SURGERY LEVEL 2 ADDTL 15MIN: Performed by: SURGERY

## 2024-04-15 PROCEDURE — 2709999900 HC NON-CHARGEABLE SUPPLY: Performed by: SURGERY

## 2024-04-15 PROCEDURE — 7100000000 HC PACU RECOVERY - FIRST 15 MIN: Performed by: SURGERY

## 2024-04-15 PROCEDURE — 64415 NJX AA&/STRD BRCH PLXS IMG: CPT | Performed by: ANESTHESIOLOGY

## 2024-04-15 PROCEDURE — 7100000010 HC PHASE II RECOVERY - FIRST 15 MIN: Performed by: SURGERY

## 2024-04-15 PROCEDURE — 3700000000 HC ANESTHESIA ATTENDED CARE: Performed by: SURGERY

## 2024-04-15 PROCEDURE — 3600000002 HC SURGERY LEVEL 2 BASE: Performed by: SURGERY

## 2024-04-15 PROCEDURE — 2580000003 HC RX 258: Performed by: ANESTHESIOLOGY

## 2024-04-15 PROCEDURE — 2720000010 HC SURG SUPPLY STERILE: Performed by: SURGERY

## 2024-04-15 PROCEDURE — 6360000002 HC RX W HCPCS: Performed by: ANESTHESIOLOGY

## 2024-04-15 PROCEDURE — A4217 STERILE WATER/SALINE, 500 ML: HCPCS | Performed by: SURGERY

## 2024-04-15 PROCEDURE — 6360000002 HC RX W HCPCS: Performed by: NURSE ANESTHETIST, CERTIFIED REGISTERED

## 2024-04-15 PROCEDURE — 7100000001 HC PACU RECOVERY - ADDTL 15 MIN: Performed by: SURGERY

## 2024-04-15 PROCEDURE — 82962 GLUCOSE BLOOD TEST: CPT

## 2024-04-15 PROCEDURE — 3700000001 HC ADD 15 MINUTES (ANESTHESIA): Performed by: SURGERY

## 2024-04-15 PROCEDURE — 2580000003 HC RX 258: Performed by: SURGERY

## 2024-04-15 RX ORDER — ONDANSETRON 2 MG/ML
INJECTION INTRAMUSCULAR; INTRAVENOUS PRN
Status: DISCONTINUED | OUTPATIENT
Start: 2024-04-15 | End: 2024-04-15 | Stop reason: SDUPTHER

## 2024-04-15 RX ORDER — PROPOFOL 10 MG/ML
INJECTION, EMULSION INTRAVENOUS CONTINUOUS PRN
Status: DISCONTINUED | OUTPATIENT
Start: 2024-04-15 | End: 2024-04-15 | Stop reason: SDUPTHER

## 2024-04-15 RX ORDER — HEPARIN SODIUM 1000 [USP'U]/ML
INJECTION, SOLUTION INTRAVENOUS; SUBCUTANEOUS PRN
Status: DISCONTINUED | OUTPATIENT
Start: 2024-04-15 | End: 2024-04-15 | Stop reason: SDUPTHER

## 2024-04-15 RX ORDER — SODIUM CHLORIDE 0.9 % (FLUSH) 0.9 %
5-40 SYRINGE (ML) INJECTION EVERY 12 HOURS SCHEDULED
Status: DISCONTINUED | OUTPATIENT
Start: 2024-04-15 | End: 2024-04-15 | Stop reason: HOSPADM

## 2024-04-15 RX ORDER — DEXAMETHASONE SODIUM PHOSPHATE 4 MG/ML
INJECTION, SOLUTION INTRA-ARTICULAR; INTRALESIONAL; INTRAMUSCULAR; INTRAVENOUS; SOFT TISSUE PRN
Status: DISCONTINUED | OUTPATIENT
Start: 2024-04-15 | End: 2024-04-15 | Stop reason: SDUPTHER

## 2024-04-15 RX ORDER — DROPERIDOL 2.5 MG/ML
0.62 INJECTION, SOLUTION INTRAMUSCULAR; INTRAVENOUS
Status: DISCONTINUED | OUTPATIENT
Start: 2024-04-15 | End: 2024-04-15 | Stop reason: HOSPADM

## 2024-04-15 RX ORDER — SODIUM CHLORIDE 0.9 % (FLUSH) 0.9 %
5-40 SYRINGE (ML) INJECTION PRN
Status: DISCONTINUED | OUTPATIENT
Start: 2024-04-15 | End: 2024-04-15 | Stop reason: HOSPADM

## 2024-04-15 RX ORDER — MIDAZOLAM HYDROCHLORIDE 1 MG/ML
INJECTION INTRAMUSCULAR; INTRAVENOUS
Status: COMPLETED | OUTPATIENT
Start: 2024-04-15 | End: 2024-04-15

## 2024-04-15 RX ORDER — HYDROMORPHONE HYDROCHLORIDE 1 MG/ML
0.5 INJECTION, SOLUTION INTRAMUSCULAR; INTRAVENOUS; SUBCUTANEOUS EVERY 5 MIN PRN
Status: DISCONTINUED | OUTPATIENT
Start: 2024-04-15 | End: 2024-04-15 | Stop reason: HOSPADM

## 2024-04-15 RX ORDER — FENTANYL CITRATE 50 UG/ML
25 INJECTION, SOLUTION INTRAMUSCULAR; INTRAVENOUS EVERY 5 MIN PRN
Status: DISCONTINUED | OUTPATIENT
Start: 2024-04-15 | End: 2024-04-15 | Stop reason: HOSPADM

## 2024-04-15 RX ORDER — ONDANSETRON 2 MG/ML
4 INJECTION INTRAMUSCULAR; INTRAVENOUS
Status: DISCONTINUED | OUTPATIENT
Start: 2024-04-15 | End: 2024-04-15 | Stop reason: HOSPADM

## 2024-04-15 RX ORDER — SODIUM CHLORIDE 9 MG/ML
INJECTION, SOLUTION INTRAVENOUS PRN
Status: DISCONTINUED | OUTPATIENT
Start: 2024-04-15 | End: 2024-04-15 | Stop reason: HOSPADM

## 2024-04-15 RX ORDER — SODIUM CHLORIDE, SODIUM LACTATE, POTASSIUM CHLORIDE, CALCIUM CHLORIDE 600; 310; 30; 20 MG/100ML; MG/100ML; MG/100ML; MG/100ML
INJECTION, SOLUTION INTRAVENOUS CONTINUOUS
Status: DISCONTINUED | OUTPATIENT
Start: 2024-04-15 | End: 2024-04-15 | Stop reason: HOSPADM

## 2024-04-15 RX ORDER — TRAMADOL HYDROCHLORIDE 50 MG/1
50 TABLET ORAL EVERY 6 HOURS PRN
Qty: 12 TABLET | Refills: 0 | Status: SHIPPED | OUTPATIENT
Start: 2024-04-15 | End: 2024-04-18

## 2024-04-15 RX ORDER — SODIUM CHLORIDE 9 MG/ML
INJECTION, SOLUTION INTRAVENOUS CONTINUOUS
Status: DISCONTINUED | OUTPATIENT
Start: 2024-04-15 | End: 2024-04-15 | Stop reason: HOSPADM

## 2024-04-15 RX ORDER — NALOXONE HYDROCHLORIDE 0.4 MG/ML
INJECTION, SOLUTION INTRAMUSCULAR; INTRAVENOUS; SUBCUTANEOUS PRN
Status: DISCONTINUED | OUTPATIENT
Start: 2024-04-15 | End: 2024-04-15 | Stop reason: HOSPADM

## 2024-04-15 RX ADMIN — SODIUM CHLORIDE: 9 INJECTION, SOLUTION INTRAVENOUS at 10:02

## 2024-04-15 RX ADMIN — ONDANSETRON HYDROCHLORIDE 4 MG: 2 INJECTION, SOLUTION INTRAMUSCULAR; INTRAVENOUS at 11:14

## 2024-04-15 RX ADMIN — DEXAMETHASONE SODIUM PHOSPHATE 4 MG: 4 INJECTION, SOLUTION INTRAMUSCULAR; INTRAVENOUS at 11:14

## 2024-04-15 RX ADMIN — HEPARIN SODIUM 5000 UNITS: 1000 INJECTION, SOLUTION INTRAVENOUS; SUBCUTANEOUS at 11:12

## 2024-04-15 RX ADMIN — MEPIVACAINE HYDROCHLORIDE 20 ML: 20 INJECTION, SOLUTION EPIDURAL; INFILTRATION at 10:52

## 2024-04-15 RX ADMIN — VANCOMYCIN HYDROCHLORIDE 1000 MG: 1 INJECTION, POWDER, LYOPHILIZED, FOR SOLUTION INTRAVENOUS at 10:04

## 2024-04-15 RX ADMIN — PROPOFOL 50 MCG/KG/MIN: 10 INJECTION, EMULSION INTRAVENOUS at 10:58

## 2024-04-15 RX ADMIN — WATER 2000 MG: 1 INJECTION INTRAMUSCULAR; INTRAVENOUS; SUBCUTANEOUS at 11:04

## 2024-04-15 RX ADMIN — MIDAZOLAM HYDROCHLORIDE 2 MG: 1 INJECTION, SOLUTION INTRAMUSCULAR; INTRAVENOUS at 10:49

## 2024-04-15 ASSESSMENT — PAIN - FUNCTIONAL ASSESSMENT: PAIN_FUNCTIONAL_ASSESSMENT: 0-10

## 2024-04-15 NOTE — ANESTHESIA PROCEDURE NOTES
Peripheral Block    Patient location during procedure: holding area  Reason for block: at surgeon's request  Start time: 4/15/2024 10:49 AM  End time: 4/15/2024 10:52 AM  Staffing  Performed: anesthesiologist   Anesthesiologist: Jojo Gamez MD  Performed by: Jojo Gamez MD  Authorized by: Jojo Gamez MD    Preanesthetic Checklist  Completed: patient identified, IV checked, site marked, risks and benefits discussed, surgical/procedural consents, equipment checked, pre-op evaluation, timeout performed, anesthesia consent given, oxygen available, monitors applied/VS acknowledged, fire risk safety assessment completed and verbalized and blood product R/B/A discussed and consented  Peripheral Block   Patient position: supine  Prep: ChloraPrep  Provider prep: sterile gloves, mask and sterile gown  Patient monitoring: cardiac monitor, continuous pulse ox, continuous capnometry, frequent blood pressure checks, IV access, oxygen and responsive to questions  Block type: Brachial plexus  Supraclavicular  Laterality: left  Injection technique: single-shot  Guidance: ultrasound guided    Needle   Needle type: insulated echogenic nerve stimulator needle   Needle gauge: 22 G  Needle localization: nerve stimulator and ultrasound guidance  Needle insertion depth: 4 cm  Needle length: 5 cm  Assessment   Injection assessment: negative aspiration for heme, no paresthesia on injection, local visualized surrounding nerve on ultrasound and no intravascular symptoms  Paresthesia pain: none  Slow fractionated injection: yes  Hemodynamics: stable  Outcomes: uncomplicated    Medications Administered  midazolam (VERSED) injection 2 mg/2mL - IntraVENous   2 mg - 4/15/2024 10:49:00 AM  mepivacaine (CARBOCAINE) injection 2% - Perineural   20 mL - 4/15/2024 10:52:00 AM

## 2024-04-15 NOTE — OP NOTE
monofilament stitch.  The patient tolerated the procedure well with no complications.        MD MICHELLE MOODY/ALBINA  D:  04/15/2024 12:32:09  T:  04/15/2024 12:58:56  JOB #:  [id number]    CC:   Enrique Ojeda MD

## 2024-04-15 NOTE — BRIEF OP NOTE
Brief Postoperative Note      Patient: Latisha Darby  YOB: 1960  MRN: [id number]    Date of Procedure: 4/15/2024    Pre-Op Diagnosis Codes:     * End stage renal disease (HCC) [N18.6]    Post-Op Diagnosis: Same       Proc: Left arm brachiocephalic AVF    Surgeon(s):  Enrique Ojeda MD    Assistant:  First Assistant: Mechelle Kearney RN    Anesthesia: Block    Estimated Blood Loss (mL): Minimal    Complications: None    Findings: Nice vein.  Good thrill.    Electronically signed by Enrique Ojeda MD on 4/15/2024 at 12:01 PM

## 2024-04-15 NOTE — FLOWSHEET NOTE
04/15/24 1155   Handoff   Communication Given Transfer Handoff   Handoff Given To Scarlet JORDAN   Handoff Received From SUNDAY Gutierrez RN and MELISSA Fine CRNA   Handoff Communication Face to Face;At bedside

## 2024-04-15 NOTE — DISCHARGE INSTRUCTIONS
Patient Discharge Instructions    Latisha Darby / [id number] : 1960    Admitted 4/15/2024 Discharged: 4/15/2024     What to do at Home  Recommended activity: Elevate arm   No needle sticks or BP checks in affected arm  Leave dressing for 72-96 hours.  Call for any HAND pain    Information obtained by :  I understand that if any problems occur once I am at home I am to contact my physician.               Learning About Peripheral Nerve Blocks and I.V. Regional Anesthesia (04:44)  Your health professional recommends that you watch this short online health video.  Learn how peripheral nerve block and I.V. regional anesthesia may be used to keep you pain-free during a surgery or procedure.  Purpose:  Describes how peripheral nerve block and I.V. regional anesthesia may be used to keep a patient pain-free during a surgery or procedure.  Goal:  The viewer will learn how peripheral nerve block and I.V. regional anesthesia may be used to keep someone pain-free during a surgery or procedure.     How to watch the video    Scan the QR code   OR Visit the website    [email or web address]

## 2024-04-15 NOTE — FLOWSHEET NOTE
04/15/24 1310   Discharge Checklist   Ride and Caregiver Arranged Yes   Ride Caregiver Provider FER (SISTER)   Responsible party will drive the patient home Yes   Special Appliances/Equipment Sling   Mobility at Departure Cane   Discharged with Documented Belongings Yes   Departure Mode By self;Other (comment)  (with sister)   AVS Reviewed   AVS & discharge instructions reviewed with patient and/or representative? Yes   Reviewed instructions with Patient   Level of Understanding Questions answered;Teach back completed;Verbalized understanding

## 2024-04-16 NOTE — ANESTHESIA POSTPROCEDURE EVALUATION
Department of Anesthesiology  Postprocedure Note    Patient: Latisha Darby  MRN: [id number]  YOB: 1960  Date of evaluation: 4/16/2024    Procedure Summary       Date: 04/15/24 Room / Location: [facility] MAIN OR M2 / [facility] MAIN OR    Anesthesia Start: 1055 Anesthesia Stop: 1154    Procedure: LEFT ARM ARTERIO VENOUS FISTULA (Left: Arm Lower) Diagnosis:       End stage renal disease (HCC)      (End stage renal disease (HCC) [N18.6])    Providers: Enrique Ojeda MD Responsible Provider: Jojo Gamez MD    Anesthesia Type: regional ASA Status: 3            Anesthesia Type: No value filed.    Albino Phase I: Albino Score: 10    Albino Phase II: Albino Score: 10    Anesthesia Post Evaluation    Patient location during evaluation: bedside  Patient participation: complete - patient participated  Level of consciousness: awake and alert  Pain scale: Controlled per protocol.  Airway patency: patent  Nausea & Vomiting: no nausea and no vomiting  Cardiovascular status: hemodynamically stable  Respiratory status: acceptable  Hydration status: stable  Multimodal analgesia pain management approach  Pain management: adequate    No notable events documented.

## 2025-02-25 ENCOUNTER — TRANSCRIBE ORDERS (OUTPATIENT)
Facility: HOSPITAL | Age: 65
End: 2025-02-25

## 2025-02-25 ENCOUNTER — HOSPITAL ENCOUNTER (OUTPATIENT)
Facility: HOSPITAL | Age: 65
Discharge: HOME | End: 2025-02-28
Payer: MEDICARE

## 2025-02-25 DIAGNOSIS — M25.551 RIGHT HIP PAIN: ICD-10-CM

## 2025-02-25 DIAGNOSIS — M25.551 RIGHT HIP PAIN: Primary | ICD-10-CM

## 2025-02-25 PROCEDURE — 73502 X-RAY EXAM HIP UNI 2-3 VIEWS: CPT

## (undated) DEVICE — Device

## (undated) DEVICE — SUTURE NONABSORBABLE MONOFILAMENT 5-0 CV-6 TTC-9 24 IN GORTX 6K02A

## (undated) DEVICE — SPONGE GZ W4XL4IN COT 12 PLY TYP VII WVN C FLD DSGN STERILE

## (undated) DEVICE — AGENT HEMSTAT W4XL4IN OXIDIZED REGENERATED CELOS ABSRB SFT

## (undated) DEVICE — PROBE VASC 8MHZ WTRPRF

## (undated) DEVICE — ELECTRODE,RADIOTRANSLUCENT,FOAM,5PK: Brand: MEDLINE

## (undated) DEVICE — SOLUTION IV 500ML 0.9% SOD CHL PH 5 INJ USP VIAFLX PLAS

## (undated) DEVICE — TRAP,MUCUS SPECIMEN, 80CC: Brand: MEDLINE

## (undated) DEVICE — CATH IV AUTOGRD BC PNK 20GA 25 -- INSYTE

## (undated) DEVICE — NON-REM POLYHESIVE PATIENT RETURN ELECTRODE: Brand: VALLEYLAB

## (undated) DEVICE — SUTURE VICRYL + SZ 3-0 L27IN ABSRB UD L26MM SH 1/2 CIR VCP416H

## (undated) DEVICE — TOWEL 4 PLY TISS 19X30 SUE WHT

## (undated) DEVICE — BANDAGE,GAUZE,BULKEE II,4.5"X4.1YD,STRL: Brand: MEDLINE

## (undated) DEVICE — TRAP ENDOSCP POLYP 2 CHMBR DRAWER TYP

## (undated) DEVICE — SNARE ENDOSCP M L240CM W27MM SHTH DIA2.4MM CHN 2.8MM OVL

## (undated) DEVICE — BASIN EMSIS 16OZ GRAPHITE PLAS KID SHP MOLD GRAD FOR ORAL

## (undated) DEVICE — CONTAINER SPEC 20 ML LID NEUT BUFF FORMALIN 10 % POLYPR STS

## (undated) DEVICE — Z DISCONTINUED PER MEDLINE LINE GAS SAMPLING O2/CO2 LNG AD 13 FT NSL W/ TBNG FILTERLINE

## (undated) DEVICE — SUTURE PROL SZ 6-0 L24IN NONABSORBABLE BLU L9.3MM BV-1 3/8 8805H

## (undated) DEVICE — GLOVE SURG SZ 8 CRM LTX FREE POLYISOPRENE POLYMER BEAD ANTI

## (undated) DEVICE — SYR 3ML LL TIP 1/10ML GRAD --

## (undated) DEVICE — 3M™ MEDIPORE™ H SOFT CLOTH SURGICAL TAPE 2864, 4 INCH X 10 YARD (10CM X 9,14M), 12 ROLLS/CASE: Brand: 3M™ MEDIPORE™

## (undated) DEVICE — GOWN,SIRUS,NONRNF,SETINSLV,2XL,18/CS: Brand: MEDLINE

## (undated) DEVICE — SYR 10ML LUER LOK 1/5ML GRAD --

## (undated) DEVICE — SOLIDIFIER FLD 2OZ 1500CC N DISINF IN BTL DISP SAFESORB

## (undated) DEVICE — HYPODERMIC SAFETY NEEDLE: Brand: MAGELLAN

## (undated) DEVICE — STAPLER SKIN H3.9MM WIRE DIA0.58MM CRWN 6.9MM 35 STPL ROT

## (undated) DEVICE — SUTURE PROL SZ 5-0 L24IN NONABSORBABLE BLU RB-2 L13IN 1/2 8554H

## (undated) DEVICE — 1200 GUARD II KIT W/5MM TUBE W/O VAC TUBE: Brand: GUARDIAN

## (undated) DEVICE — NEONATAL-ADULT SPO2 SENSOR: Brand: NELLCOR

## (undated) DEVICE — SET ADMIN 16ML TBNG L100IN 2 Y INJ SITE IV PIGGY BK DISP

## (undated) DEVICE — SNARE ENDOSCP L240CM LOOP W27MM SHTH DIA2.4MM WRK CHN 2.8MM

## (undated) DEVICE — AV FISTULA - MRMC: Brand: MEDLINE INDUSTRIES, INC.

## (undated) DEVICE — STRAINER URIN CALC RNL MSH -- CONVERT TO ITEM 357634